# Patient Record
Sex: MALE | Race: WHITE | NOT HISPANIC OR LATINO | Employment: FULL TIME | URBAN - METROPOLITAN AREA
[De-identification: names, ages, dates, MRNs, and addresses within clinical notes are randomized per-mention and may not be internally consistent; named-entity substitution may affect disease eponyms.]

---

## 2019-10-29 ENCOUNTER — LAB (OUTPATIENT)
Dept: LAB | Facility: HOSPITAL | Age: 69
End: 2019-10-29

## 2019-10-29 ENCOUNTER — OFFICE VISIT (OUTPATIENT)
Dept: FAMILY MEDICINE CLINIC | Facility: CLINIC | Age: 69
End: 2019-10-29

## 2019-10-29 ENCOUNTER — TELEPHONE (OUTPATIENT)
Dept: FAMILY MEDICINE CLINIC | Facility: CLINIC | Age: 69
End: 2019-10-29

## 2019-10-29 VITALS
DIASTOLIC BLOOD PRESSURE: 88 MMHG | SYSTOLIC BLOOD PRESSURE: 128 MMHG | BODY MASS INDEX: 37.37 KG/M2 | WEIGHT: 282 LBS | TEMPERATURE: 98.8 F | OXYGEN SATURATION: 98 % | RESPIRATION RATE: 16 BRPM | HEART RATE: 101 BPM | HEIGHT: 73 IN

## 2019-10-29 DIAGNOSIS — I10 ESSENTIAL HYPERTENSION: ICD-10-CM

## 2019-10-29 DIAGNOSIS — N40.1 BENIGN PROSTATIC HYPERPLASIA WITH LOWER URINARY TRACT SYMPTOMS, SYMPTOM DETAILS UNSPECIFIED: ICD-10-CM

## 2019-10-29 DIAGNOSIS — Z23 FLU VACCINE NEED: ICD-10-CM

## 2019-10-29 DIAGNOSIS — R53.83 FATIGUE, UNSPECIFIED TYPE: ICD-10-CM

## 2019-10-29 DIAGNOSIS — I48.91 ATRIAL FIBRILLATION, UNSPECIFIED TYPE (HCC): Primary | ICD-10-CM

## 2019-10-29 DIAGNOSIS — Z12.11 COLON CANCER SCREENING: ICD-10-CM

## 2019-10-29 DIAGNOSIS — J40 BRONCHITIS: ICD-10-CM

## 2019-10-29 LAB
ALBUMIN SERPL-MCNC: 4.1 G/DL (ref 3.5–5.2)
ALBUMIN/GLOB SERPL: 1.4 G/DL
ALP SERPL-CCNC: 63 U/L (ref 39–117)
ALT SERPL W P-5'-P-CCNC: 96 U/L (ref 1–41)
ANION GAP SERPL CALCULATED.3IONS-SCNC: 12.6 MMOL/L (ref 5–15)
AST SERPL-CCNC: 77 U/L (ref 1–40)
BASOPHILS # BLD AUTO: 0.04 10*3/MM3 (ref 0–0.2)
BASOPHILS NFR BLD AUTO: 0.7 % (ref 0–1.5)
BILIRUB SERPL-MCNC: 0.7 MG/DL (ref 0.2–1.2)
BUN BLD-MCNC: 11 MG/DL (ref 8–23)
BUN/CREAT SERPL: 12 (ref 7–25)
CALCIUM SPEC-SCNC: 9.4 MG/DL (ref 8.6–10.5)
CHLORIDE SERPL-SCNC: 104 MMOL/L (ref 98–107)
CHOLEST SERPL-MCNC: 123 MG/DL (ref 0–200)
CO2 SERPL-SCNC: 22.4 MMOL/L (ref 22–29)
CREAT BLD-MCNC: 0.92 MG/DL (ref 0.76–1.27)
DEPRECATED RDW RBC AUTO: 42.8 FL (ref 37–54)
EOSINOPHIL # BLD AUTO: 0.16 10*3/MM3 (ref 0–0.4)
EOSINOPHIL NFR BLD AUTO: 2.8 % (ref 0.3–6.2)
ERYTHROCYTE [DISTWIDTH] IN BLOOD BY AUTOMATED COUNT: 12.7 % (ref 12.3–15.4)
GFR SERPL CREATININE-BSD FRML MDRD: 82 ML/MIN/1.73
GLOBULIN UR ELPH-MCNC: 3 GM/DL
GLUCOSE BLD-MCNC: 103 MG/DL (ref 65–99)
HCT VFR BLD AUTO: 50.2 % (ref 37.5–51)
HDLC SERPL-MCNC: 40 MG/DL (ref 40–60)
HGB BLD-MCNC: 17.9 G/DL (ref 13–17.7)
IMM GRANULOCYTES # BLD AUTO: 0.02 10*3/MM3 (ref 0–0.05)
IMM GRANULOCYTES NFR BLD AUTO: 0.3 % (ref 0–0.5)
LDLC SERPL CALC-MCNC: 51 MG/DL (ref 0–100)
LDLC/HDLC SERPL: 1.28 {RATIO}
LYMPHOCYTES # BLD AUTO: 0.91 10*3/MM3 (ref 0.7–3.1)
LYMPHOCYTES NFR BLD AUTO: 15.7 % (ref 19.6–45.3)
MCH RBC QN AUTO: 32.9 PG (ref 26.6–33)
MCHC RBC AUTO-ENTMCNC: 35.7 G/DL (ref 31.5–35.7)
MCV RBC AUTO: 92.3 FL (ref 79–97)
MONOCYTES # BLD AUTO: 0.57 10*3/MM3 (ref 0.1–0.9)
MONOCYTES NFR BLD AUTO: 9.9 % (ref 5–12)
NEUTROPHILS # BLD AUTO: 4.08 10*3/MM3 (ref 1.7–7)
NEUTROPHILS NFR BLD AUTO: 70.6 % (ref 42.7–76)
NRBC BLD AUTO-RTO: 0 /100 WBC (ref 0–0.2)
PLATELET # BLD AUTO: 138 10*3/MM3 (ref 140–450)
PMV BLD AUTO: 11.5 FL (ref 6–12)
POTASSIUM BLD-SCNC: 4.4 MMOL/L (ref 3.5–5.2)
PROT SERPL-MCNC: 7.1 G/DL (ref 6–8.5)
PSA SERPL-MCNC: 4.7 NG/ML (ref 0–4)
RBC # BLD AUTO: 5.44 10*6/MM3 (ref 4.14–5.8)
SODIUM BLD-SCNC: 139 MMOL/L (ref 136–145)
TRIGL SERPL-MCNC: 159 MG/DL (ref 0–150)
TSH SERPL DL<=0.05 MIU/L-ACNC: 2.92 UIU/ML (ref 0.27–4.2)
VLDLC SERPL-MCNC: 31.8 MG/DL (ref 5–40)
WBC NRBC COR # BLD: 5.78 10*3/MM3 (ref 3.4–10.8)

## 2019-10-29 PROCEDURE — 99203 OFFICE O/P NEW LOW 30 MIN: CPT | Performed by: PHYSICIAN ASSISTANT

## 2019-10-29 PROCEDURE — 85025 COMPLETE CBC W/AUTO DIFF WBC: CPT

## 2019-10-29 PROCEDURE — 80061 LIPID PANEL: CPT

## 2019-10-29 PROCEDURE — 84443 ASSAY THYROID STIM HORMONE: CPT

## 2019-10-29 PROCEDURE — 84153 ASSAY OF PSA TOTAL: CPT

## 2019-10-29 PROCEDURE — 80053 COMPREHEN METABOLIC PANEL: CPT | Performed by: PHYSICIAN ASSISTANT

## 2019-10-29 PROCEDURE — 36415 COLL VENOUS BLD VENIPUNCTURE: CPT | Performed by: PHYSICIAN ASSISTANT

## 2019-10-29 RX ORDER — CEFDINIR 300 MG/1
300 CAPSULE ORAL 2 TIMES DAILY
Qty: 28 CAPSULE | Refills: 0 | Status: SHIPPED | OUTPATIENT
Start: 2019-10-29 | End: 2019-11-06

## 2019-10-29 RX ORDER — DILTIAZEM HYDROCHLORIDE 180 MG/1
180 CAPSULE, EXTENDED RELEASE ORAL DAILY
Refills: 0 | COMMUNITY
Start: 2019-08-18

## 2019-10-29 RX ORDER — RIVAROXABAN 20 MG/1
20 TABLET, FILM COATED ORAL DAILY
Refills: 3 | COMMUNITY
Start: 2019-10-05 | End: 2022-06-15

## 2019-10-29 RX ORDER — LOSARTAN POTASSIUM 50 MG/1
1 TABLET ORAL DAILY
COMMUNITY
End: 2021-06-10 | Stop reason: ALTCHOICE

## 2019-10-29 RX ORDER — DUTASTERIDE 0.5 MG/1
0.5 CAPSULE, LIQUID FILLED ORAL DAILY
Qty: 30 CAPSULE | Refills: 11 | Status: SHIPPED | OUTPATIENT
Start: 2019-10-29 | End: 2021-06-10

## 2019-10-29 NOTE — TELEPHONE ENCOUNTER
I spoke with Isabela and she said that the patient found the prescription that Esvin had written him.

## 2019-10-29 NOTE — PROGRESS NOTES
Subjective   Vishal Dejesus is a 69 y.o. male  Establish Care (No recent labs. Needs cardiologist referral. Req flu vaccine. )      History of Present Illness  Patient is a pleasant 69-year-old white male who is a former patient comes in a new patient been over 5 years since his been here he comes in with multiple complaints his first complaint is chronic atrial fibrillation is controlled with medication he needs referral to cardiology    Patient comes in complaining of increased frequency upon urination nocturia trouble stopping starting urination for the last year patient states he gets up at least 4-5 times during the night he has decreased stream hesitancy and symptoms been getting worse the last couple of weeks    Patient comes in follow-up of hypertension needs refill on blood pressure medicine also needs lab work but over the years has had blood work done he has some fatigue no energy this is been going for some time spent over a year since he had blood work done    Patient comes in stating he needs colon cancer screening has not had a colonoscopy he is put it off would like to get one    Patient has sinus pressure sinus congestion blowing green-yellow drainage nose mild sore throat cough is slightly productive frontal headache mild sore throat cough wheezing and chest      The following portions of the patient's history were reviewed and updated as appropriate: allergies, current medications, past social history and problem list    Review of Systems   Constitutional: Negative for chills, fatigue and fever.   HENT: Positive for congestion, ear pain, postnasal drip, rhinorrhea and sinus pressure. Negative for sore throat.    Eyes: Positive for discharge and itching. Negative for pain.   Respiratory: Positive for cough, chest tightness and wheezing. Negative for shortness of breath.    Cardiovascular: Negative for chest pain.   Gastrointestinal: Negative.  Negative for nausea.   Genitourinary: Negative.     Musculoskeletal: Negative for myalgias.   Neurological: Positive for light-headedness and headaches. Negative for dizziness.   Hematological: Negative for adenopathy.   Psychiatric/Behavioral: Positive for sleep disturbance.       Objective     Vitals:    10/29/19 1444   BP: 128/88   Pulse: 101   Resp: 16   Temp: 98.8 °F (37.1 °C)   SpO2: 98%       Physical Exam   Constitutional: He appears well-developed and well-nourished.   HENT:   Head: Normocephalic and atraumatic.   Right Ear: Tympanic membrane and ear canal normal.   Left Ear: Tympanic membrane and ear canal normal.   Nose: Mucosal edema, rhinorrhea and sinus tenderness present. Right sinus exhibits maxillary sinus tenderness and frontal sinus tenderness. Left sinus exhibits maxillary sinus tenderness and frontal sinus tenderness.   Mouth/Throat: Oropharynx is clear and moist. No oropharyngeal exudate.   Eyes: Pupils are equal, round, and reactive to light.   Cardiovascular: Normal rate and regular rhythm.   Pulmonary/Chest: Effort normal and breath sounds normal.   Nursing note and vitals reviewed.      Assessment/Plan     Diagnoses and all orders for this visit:    Atrial fibrillation, unspecified type (CMS/HCC)  -     XARELTO 20 MG tablet; Take 20 mg by mouth Daily.  -     losartan (COZAAR) 50 MG tablet; Take 1 tablet by mouth Daily.  -     metoprolol tartrate (LOPRESSOR) 25 MG tablet; Take 25 mg by mouth Daily.  -     Ambulatory Referral to Cardiology    Benign prostatic hyperplasia with lower urinary tract symptoms, symptom details unspecified  -     PSA DIAGNOSTIC; Future  -     dutasteride (AVODART) 0.5 MG capsule; Take 1 capsule by mouth Daily.    Essential hypertension  -     losartan (COZAAR) 50 MG tablet; Take 1 tablet by mouth Daily.  -     metoprolol tartrate (LOPRESSOR) 25 MG tablet; Take 25 mg by mouth Daily.  -     Lipid Panel; Future    Fatigue, unspecified type  -     Comprehensive Metabolic Panel  -     CBC & Differential; Future  -      TSH; Future    Colon cancer screening  -     Ambulatory Referral For Screening Colonoscopy    Bronchitis  -     cefdinir (OMNICEF) 300 MG capsule; Take 1 capsule by mouth 2 (Two) Times a Day.    Other orders  -     CARTIA  MG 24 hr capsule; Take 180 mg by mouth Daily.    Tussionex 1 teaspoon every 12 hours  #180ml

## 2019-10-29 NOTE — TELEPHONE ENCOUNTER
PHARMACY CALLED IN TO REQUEST COUGH MEDICINE BE SENT INTO THE PHARMACY PATIENT IS THERE WAITING. PATIENT TOLD PHARMACY THIS COUGH SYRUP WAS OF HIGH IMPORTANCE PLEASE CALL IT IN TONIGHT

## 2019-11-06 ENCOUNTER — OFFICE VISIT (OUTPATIENT)
Dept: FAMILY MEDICINE CLINIC | Facility: CLINIC | Age: 69
End: 2019-11-06

## 2019-11-06 VITALS
WEIGHT: 278.4 LBS | HEART RATE: 76 BPM | HEIGHT: 73 IN | BODY MASS INDEX: 36.9 KG/M2 | SYSTOLIC BLOOD PRESSURE: 126 MMHG | TEMPERATURE: 98.3 F | RESPIRATION RATE: 18 BRPM | OXYGEN SATURATION: 98 % | DIASTOLIC BLOOD PRESSURE: 90 MMHG

## 2019-11-06 DIAGNOSIS — R97.20 ELEVATED PSA: ICD-10-CM

## 2019-11-06 DIAGNOSIS — E66.01 MORBIDLY OBESE (HCC): ICD-10-CM

## 2019-11-06 DIAGNOSIS — J40 BRONCHITIS: Primary | ICD-10-CM

## 2019-11-06 PROCEDURE — 99214 OFFICE O/P EST MOD 30 MIN: CPT | Performed by: PHYSICIAN ASSISTANT

## 2019-11-06 RX ORDER — LEVOFLOXACIN 500 MG/1
500 TABLET, FILM COATED ORAL DAILY
Qty: 14 TABLET | Refills: 0 | Status: SHIPPED | OUTPATIENT
Start: 2019-11-06 | End: 2020-10-15

## 2019-11-06 RX ORDER — ALBUTEROL SULFATE 90 UG/1
2 AEROSOL, METERED RESPIRATORY (INHALATION) EVERY 4 HOURS PRN
Qty: 6.7 G | Refills: 1 | Status: SHIPPED | OUTPATIENT
Start: 2019-11-06 | End: 2022-06-15

## 2019-11-06 RX ORDER — LEVOFLOXACIN 500 MG/1
500 TABLET, FILM COATED ORAL DAILY
Qty: 10 TABLET | Refills: 0 | Status: SHIPPED | OUTPATIENT
Start: 2019-11-06 | End: 2019-11-06

## 2019-11-06 NOTE — PROGRESS NOTES
Subjective   Vishal Dejesus is a 69 y.o. male  Cough (F/U-finished cefdinir and cough syrup)      History of Present Illness  69-year-old white male who comes in for follow-up of cough patient still coughing congested coughing up yellow sputum did not get any better on Omnicef and Tussionex patient's cough is productive some yellow sputum    Patient comes in follow-up of blood work PSA was 4.7 he was started on Avodart his last visit patient states that he has had some minimal improvement PSA was elevated    Patient comes in complaining of obesity BMI is 36.74  wants to lose weight on recent blood work is liver enzymes are elevated  The following portions of the patient's history were reviewed and updated as appropriate: allergies, current medications, past social history and problem list    Review of Systems   Constitutional: Negative for appetite change, diaphoresis, fatigue and unexpected weight change.   Eyes: Negative for visual disturbance.   Respiratory: Positive for cough, shortness of breath and wheezing. Negative for chest tightness.    Cardiovascular: Negative for chest pain, palpitations and leg swelling.   Gastrointestinal: Negative for diarrhea, nausea and vomiting.   Endocrine: Negative for polydipsia, polyphagia and polyuria.   Skin: Negative for color change and rash.   Neurological: Negative for dizziness, syncope, weakness, light-headedness, numbness and headaches.       Objective     Vitals:    11/06/19 1028   BP: 126/90   Pulse: 76   Resp: 18   Temp: 98.3 °F (36.8 °C)   SpO2: 98%       Physical Exam   Constitutional: He appears well-developed and well-nourished.   HENT:   Head: Normocephalic and atraumatic.   Right Ear: Tympanic membrane and ear canal normal.   Left Ear: Tympanic membrane and ear canal normal.   Nose: Mucosal edema, rhinorrhea and sinus tenderness present. Right sinus exhibits maxillary sinus tenderness and frontal sinus tenderness. Left sinus exhibits maxillary sinus tenderness  and frontal sinus tenderness.   Mouth/Throat: No oropharyngeal exudate.   Eyes: Pupils are equal, round, and reactive to light.   Neck: Neck supple. No JVD present. No thyromegaly present.   Cardiovascular: Normal rate, regular rhythm, normal heart sounds, intact distal pulses and normal pulses.   No murmur heard.  Pulmonary/Chest: Effort normal and breath sounds normal. No respiratory distress.   Abdominal: Soft. Bowel sounds are normal. There is no hepatosplenomegaly. There is no tenderness.   Musculoskeletal: He exhibits no edema.   Lymphadenopathy:     He has no cervical adenopathy.   Neurological: No sensory deficit.   Skin: Skin is warm and dry. He is not diaphoretic.   Nursing note and vitals reviewed.      Assessment/Plan     Diagnoses and all orders for this visit:    Bronchitis  -     levoFLOXacin (LEVAQUIN) 500 MG tablet; Take 1 tablet by mouth Daily.  -     albuterol sulfate  (90 Base) MCG/ACT inhaler; Inhale 2 puffs Every 4 (Four) Hours As Needed for Wheezing.    Elevated PSA  -     Cancel: PSA DIAGNOSTIC; Future  -     PSA DIAGNOSTIC; Future  -     levoFLOXacin (LEVAQUIN) 500 MG tablet; Take 1 tablet by mouth Daily.    Morbidly obese (CMS/HCC)    Other orders  -     Discontinue: levoFLOXacin (LEVAQUIN) 500 MG tablet; Take 1 tablet by mouth Daily.    #1 Levaquin 500 mg 1 p.o. every day dispense 14    Albuterol inhaler 2 puffs every 6 hours as needed wheezing    2.  Patient's PSA was 4.7 we will go ahead and put him on Levaquin for 2 weeks at that time he is to come back and get a PSA to see if the number is changed we discussed treatment options referral to urology also we MRI of the prostate explained to patient this will not be covered by insurance he is willing to pay out-of-pocket which will set up if his PSA is still elevated    3.  Talked about losing weight low-carb low calorie diet exercise

## 2019-11-14 ENCOUNTER — TELEPHONE (OUTPATIENT)
Dept: FAMILY MEDICINE CLINIC | Facility: CLINIC | Age: 69
End: 2019-11-14

## 2019-11-14 NOTE — TELEPHONE ENCOUNTER
Pt is having surgery, left total knee on 11/26/19 and is inquiring if clear for surgery and they are sending all of pre-op paperwork

## 2019-11-19 ENCOUNTER — TELEPHONE (OUTPATIENT)
Dept: FAMILY MEDICINE CLINIC | Facility: CLINIC | Age: 69
End: 2019-11-19

## 2019-11-19 NOTE — TELEPHONE ENCOUNTER
"----- Message from Clayton Chen sent at 11/19/2019 11:16 AM EST -----  Contact: EDDI IRVIN FROM [facility].  [phone]  PATIENT IS HAVING SX ON Monday THE 26TH.  EDDI IRVIN IS CALLING TO GET A CLEARANCE FOR SX FROM [facility].  PER EDDI, [facility] CAN SIMPLY USE A PRESCRIPTION PAD TO WRITE \"CLEARED\" AND THEN FAX TO HER    [phone]  PHONE [phone]    "

## 2019-11-19 NOTE — TELEPHONE ENCOUNTER
Patient was here for an office visit Nov 6. We have received all of his preop test results for you to review.

## 2020-01-30 DIAGNOSIS — R97.20 ELEVATED PSA: Primary | ICD-10-CM

## 2020-02-12 ENCOUNTER — TELEPHONE (OUTPATIENT)
Dept: GASTROENTEROLOGY | Facility: CLINIC | Age: 70
End: 2020-02-12

## 2020-02-12 RX ORDER — SODIUM, POTASSIUM,MAG SULFATES 17.5-3.13G
1 SOLUTION, RECONSTITUTED, ORAL ORAL TAKE AS DIRECTED
Qty: 2 BOTTLE | Refills: 0 | Status: SHIPPED | OUTPATIENT
Start: 2020-02-12 | End: 2021-06-10

## 2020-02-12 NOTE — TELEPHONE ENCOUNTER
Cardiac clearance faxed to Gagandeep [place] office on 2/12/2020. Patient is currently on Xarelto.

## 2020-02-21 ENCOUNTER — OUTSIDE FACILITY SERVICE (OUTPATIENT)
Dept: GASTROENTEROLOGY | Facility: CLINIC | Age: 70
End: 2020-02-21

## 2020-02-21 ENCOUNTER — LAB REQUISITION (OUTPATIENT)
Dept: LAB | Facility: HOSPITAL | Age: 70
End: 2020-02-21

## 2020-02-21 DIAGNOSIS — Z12.11 ENCOUNTER FOR SCREENING FOR MALIGNANT NEOPLASM OF COLON: ICD-10-CM

## 2020-02-21 PROCEDURE — 88305 TISSUE EXAM BY PATHOLOGIST: CPT | Performed by: INTERNAL MEDICINE

## 2020-02-21 PROCEDURE — 45385 COLONOSCOPY W/LESION REMOVAL: CPT | Performed by: INTERNAL MEDICINE

## 2020-02-24 LAB
CYTO UR: NORMAL
LAB AP CASE REPORT: NORMAL
LAB AP CLINICAL INFORMATION: NORMAL
PATH REPORT.FINAL DX SPEC: NORMAL
PATH REPORT.GROSS SPEC: NORMAL

## 2020-02-25 ENCOUNTER — TELEPHONE (OUTPATIENT)
Dept: GASTROENTEROLOGY | Facility: CLINIC | Age: 70
End: 2020-02-25

## 2020-02-25 NOTE — TELEPHONE ENCOUNTER
----- Message from Jonathon York MD sent at 2/25/2020 12:35 PM EST -----  Let Mr. Dejesus know the polyps were adenoma type.  He will need a repeat examination in 3 years.  Thank you,  MICHELLE

## 2020-10-15 ENCOUNTER — TELEPHONE (OUTPATIENT)
Dept: FAMILY MEDICINE CLINIC | Facility: CLINIC | Age: 70
End: 2020-10-15

## 2020-10-15 ENCOUNTER — TELEMEDICINE (OUTPATIENT)
Dept: FAMILY MEDICINE CLINIC | Facility: CLINIC | Age: 70
End: 2020-10-15

## 2020-10-15 DIAGNOSIS — R05.9 COUGH: Primary | ICD-10-CM

## 2020-10-15 PROCEDURE — 99213 OFFICE O/P EST LOW 20 MIN: CPT | Performed by: PHYSICIAN ASSISTANT

## 2020-10-15 RX ORDER — LEVOFLOXACIN 500 MG/1
500 TABLET, FILM COATED ORAL DAILY
Qty: 10 TABLET | Refills: 0 | Status: SHIPPED | OUTPATIENT
Start: 2020-10-15 | End: 2021-06-10

## 2020-10-15 NOTE — PROGRESS NOTES
Subjective   Vishal Dejesus is a 70 y.o. male  Cough  video    Cough  Pertinent negatives include no chest pain, headaches, rash or shortness of breath.   Patient is a pleasant 70-year-old white male who comes in for cough congestion no shortness of breath blowing yellow drainage from nose no fever no shortness of breath no chest pain no taste or smell intolerance cough is worse at night large sinus pressure congestion    The following portions of the patient's history were reviewed and updated as appropriate: allergies, current medications, past social history and problem list    Review of Systems   Constitutional: Negative for appetite change, diaphoresis, fatigue and unexpected weight change.   Eyes: Negative for visual disturbance.   Respiratory: Positive for cough. Negative for chest tightness and shortness of breath.    Cardiovascular: Negative for chest pain, palpitations and leg swelling.   Gastrointestinal: Negative for diarrhea, nausea and vomiting.   Endocrine: Negative for polydipsia, polyphagia and polyuria.   Skin: Negative for color change and rash.   Neurological: Negative for dizziness, syncope, weakness, light-headedness, numbness and headaches.       Objective     There were no vitals filed for this visit.    Physical Exam  Constitutional:       Appearance: Normal appearance. He is normal weight.   Neurological:      Mental Status: He is alert.   Psychiatric:         Mood and Affect: Mood normal.         Behavior: Behavior normal.         Thought Content: Thought content normal.         Judgment: Judgment normal.         Assessment/Plan     Diagnoses and all orders for this visit:    1. Cough (Primary)  -     levoFLOXacin (Levaquin) 500 MG tablet; Take 1 tablet by mouth Daily.  Dispense: 10 tablet; Refill: 0    Tussionex 1 teaspoon every 12 hours dispense 120    Follow-up if no better spent 16 minutes states importance of taking medication follow-up

## 2020-10-15 NOTE — TELEPHONE ENCOUNTER
Patient wanting to be seen for a cough and congestion - possible bronchitis. I advised patient we are screening all of patient due to covid and he stated he would be okay with a virtual appt, he does have an iphone so he can facetime. He wanted the message to go to Esvin directly to make sure he knows what is going on - he stated that his wife Risa has it too.     Call back number is [phone]

## 2020-10-15 NOTE — TELEPHONE ENCOUNTER
Esvin said that he can see patient this afternoon at 1:45. I notified patient and he is scheduled for a video visit.

## 2021-02-24 ENCOUNTER — LAB (OUTPATIENT)
Dept: LAB | Facility: HOSPITAL | Age: 71
End: 2021-02-24

## 2021-02-24 DIAGNOSIS — Z12.5 SPECIAL SCREENING FOR MALIGNANT NEOPLASM OF PROSTATE: Primary | ICD-10-CM

## 2021-02-24 DIAGNOSIS — Z12.5 SPECIAL SCREENING FOR MALIGNANT NEOPLASM OF PROSTATE: ICD-10-CM

## 2021-02-24 LAB — PSA SERPL-MCNC: 5.79 NG/ML (ref 0–4)

## 2021-02-24 PROCEDURE — 36415 COLL VENOUS BLD VENIPUNCTURE: CPT

## 2021-02-24 PROCEDURE — G0103 PSA SCREENING: HCPCS

## 2021-03-10 DIAGNOSIS — R97.20 ELEVATED PSA: Primary | ICD-10-CM

## 2021-03-29 ENCOUNTER — OFFICE VISIT (OUTPATIENT)
Dept: UROLOGY | Facility: CLINIC | Age: 71
End: 2021-03-29

## 2021-03-29 VITALS
HEART RATE: 87 BPM | DIASTOLIC BLOOD PRESSURE: 80 MMHG | WEIGHT: 270 LBS | OXYGEN SATURATION: 98 % | HEIGHT: 73 IN | BODY MASS INDEX: 35.78 KG/M2 | SYSTOLIC BLOOD PRESSURE: 118 MMHG | RESPIRATION RATE: 18 BRPM

## 2021-03-29 DIAGNOSIS — R97.20 ELEVATED PROSTATE SPECIFIC ANTIGEN (PSA): Primary | ICD-10-CM

## 2021-03-29 LAB
BILIRUB BLD-MCNC: NEGATIVE MG/DL
CLARITY, POC: CLEAR
COLOR UR: YELLOW
GLUCOSE UR STRIP-MCNC: NEGATIVE MG/DL
KETONES UR QL: NEGATIVE
LEUKOCYTE EST, POC: NEGATIVE
NITRITE UR-MCNC: NEGATIVE MG/ML
PH UR: 6 [PH] (ref 5–8)
PROT UR STRIP-MCNC: NEGATIVE MG/DL
RBC # UR STRIP: NEGATIVE /UL
SP GR UR: 1.03 (ref 1–1.03)
UROBILINOGEN UR QL: NORMAL

## 2021-03-29 PROCEDURE — 99024 POSTOP FOLLOW-UP VISIT: CPT | Performed by: UROLOGY

## 2021-03-29 PROCEDURE — 81003 URINALYSIS AUTO W/O SCOPE: CPT | Performed by: UROLOGY

## 2021-06-10 ENCOUNTER — OFFICE VISIT (OUTPATIENT)
Dept: FAMILY MEDICINE CLINIC | Facility: CLINIC | Age: 71
End: 2021-06-10

## 2021-06-10 VITALS
DIASTOLIC BLOOD PRESSURE: 78 MMHG | HEIGHT: 73 IN | HEART RATE: 56 BPM | RESPIRATION RATE: 16 BRPM | SYSTOLIC BLOOD PRESSURE: 132 MMHG | BODY MASS INDEX: 36.53 KG/M2 | WEIGHT: 275.6 LBS | TEMPERATURE: 96.9 F | OXYGEN SATURATION: 98 %

## 2021-06-10 DIAGNOSIS — Z01.818 PREOPERATIVE CLEARANCE: ICD-10-CM

## 2021-06-10 DIAGNOSIS — I48.91 ATRIAL FIBRILLATION, UNSPECIFIED TYPE (HCC): Primary | ICD-10-CM

## 2021-06-10 PROCEDURE — 99213 OFFICE O/P EST LOW 20 MIN: CPT | Performed by: PHYSICIAN ASSISTANT

## 2021-06-10 RX ORDER — METOPROLOL SUCCINATE 25 MG/1
25 TABLET, EXTENDED RELEASE ORAL DAILY
COMMUNITY
Start: 2021-06-01

## 2021-06-10 NOTE — PROGRESS NOTES
Subjective   Vishal Dejesus is a 70 y.o. male  Pre-op Exam (Rt cataract surgery Vanesa 15 by Dr. Hay Luke at [facility]. Fax clearance to [phone])      History of Present Illness  Patient is a pleasant 70-year-old white male who comes in for chronic atrial fib which is controlled with medication, chronic and stable patient needs preop clearance for right cataract surgery.  The following portions of the patient's history were reviewed and updated as appropriate: allergies, current medications, past social history and problem list    Review of Systems   Constitutional: Negative for fatigue and unexpected weight change.   Respiratory: Negative for cough, chest tightness and shortness of breath.    Cardiovascular: Negative for chest pain, palpitations and leg swelling.   Gastrointestinal: Negative for nausea.   Skin: Negative for color change and rash.   Neurological: Negative for dizziness, syncope, weakness and headaches.       Objective     Vitals:    06/10/21 1255   BP: 132/78   Pulse: 56   Resp: 16   Temp: 96.9 °F (36.1 °C)   SpO2: 98%       Physical Exam  Vitals and nursing note reviewed.   Constitutional:       Appearance: He is well-developed.   Neck:      Vascular: No JVD.   Cardiovascular:      Rate and Rhythm: Normal rate and regular rhythm.      Pulses: Normal pulses.      Heart sounds: Normal heart sounds. No murmur heard.     Pulmonary:      Effort: Pulmonary effort is normal. No respiratory distress.      Breath sounds: Normal breath sounds.   Abdominal:      General: Bowel sounds are normal.      Palpations: Abdomen is soft.      Tenderness: There is no abdominal tenderness.   Skin:     General: Skin is warm and dry.         Assessment/Plan     Diagnoses and all orders for this visit:    1. Atrial fibrillation, unspecified type (CMS/HCC) (Primary)    2. Preoperative clearance    #1 continue all meds ,stable    2 cleared for procedure:    Follow-up as needed

## 2021-06-13 ENCOUNTER — APPOINTMENT (OUTPATIENT)
Dept: PREADMISSION TESTING | Facility: HOSPITAL | Age: 71
End: 2021-06-13

## 2021-07-26 DIAGNOSIS — M25.521 RIGHT ELBOW PAIN: Primary | ICD-10-CM

## 2021-08-09 ENCOUNTER — OFFICE VISIT (OUTPATIENT)
Dept: UROLOGY | Age: 71
End: 2021-08-09

## 2021-08-09 VITALS
TEMPERATURE: 98.1 F | RESPIRATION RATE: 14 BRPM | SYSTOLIC BLOOD PRESSURE: 130 MMHG | DIASTOLIC BLOOD PRESSURE: 72 MMHG | HEART RATE: 70 BPM | OXYGEN SATURATION: 98 %

## 2021-08-09 DIAGNOSIS — R97.20 ELEVATED PROSTATE SPECIFIC ANTIGEN (PSA): Primary | ICD-10-CM

## 2021-08-09 LAB
BILIRUB BLD-MCNC: NEGATIVE MG/DL
CLARITY, POC: CLEAR
COLOR UR: YELLOW
GLUCOSE UR STRIP-MCNC: NEGATIVE MG/DL
KETONES UR QL: NEGATIVE
LEUKOCYTE EST, POC: NEGATIVE
NITRITE UR-MCNC: NEGATIVE MG/ML
PH UR: 5.5 [PH] (ref 5–8)
PROT UR STRIP-MCNC: NEGATIVE MG/DL
RBC # UR STRIP: NEGATIVE /UL
SP GR UR: 1.02 (ref 1–1.03)
UROBILINOGEN UR QL: NORMAL

## 2021-08-09 PROCEDURE — 99203 OFFICE O/P NEW LOW 30 MIN: CPT | Performed by: UROLOGY

## 2021-08-09 PROCEDURE — 81003 URINALYSIS AUTO W/O SCOPE: CPT | Performed by: UROLOGY

## 2021-08-09 NOTE — PROGRESS NOTES
Chief Complaint  Elevated PSA    Referring Provider  Gagandeep Galaviz,*    HPI  Mr. Dejesus is a 70 y.o.  male who presents with an elevated PSA to   PSA    PSA 2/24/21   PSA 5.790 (A)   (A) Abnormal value             Patient complains of nocturia.  His IPSS score is 7.     Patient denies fevers, chills, nausea, vomiting, constipation, or flank pain.  Past  history is negative for BPH, frequent UTIs, gross hematuria, stones or other renal diseases.     He denies shortness of breath, leg swelling, calf pain or bone pain.    Past Medical History  Past Medical History:   Diagnosis Date   • A-fib (CMS/[facility])        Past Surgical History  Past Surgical History:   Procedure Laterality Date   • APPENDECTOMY  1986   • CARDIAC ABLATION  2013   • KNEE SURGERY         Medications    Current Outpatient Medications:   •  CARTIA  MG 24 hr capsule, Take 180 mg by mouth Daily., Disp: , Rfl: 0  •  metoprolol succinate XL (TOPROL-XL) 25 MG 24 hr tablet, Take 25 mg by mouth Daily., Disp: , Rfl:   •  XARELTO 20 MG tablet, Take 20 mg by mouth Daily., Disp: , Rfl: 3  •  albuterol sulfate  (90 Base) MCG/ACT inhaler, Inhale 2 puffs Every 4 (Four) Hours As Needed for Wheezing., Disp: 6.7 g, Rfl: 1    Allergies  No Known Allergies    Social History  Social History     Tobacco Use   • Smoking status: Never Smoker   • Smokeless tobacco: Never Used   Substance Use Topics   • Alcohol use: Yes     Comment: 5 drinks/week   • Drug use: No       Family History  Family History   Problem Relation Age of Onset   • Hypertension Mother    • Stroke Mother    • Hypertension Sister    • Diabetes Sister       He has no family history of prostate cancer.    Review of Systems  A full review of systems was completed and notable for afib.    Physical Exam  Visit Vitals  /72   Pulse 70   Temp 98.1 °F (36.7 °C) (Temporal)   Resp 14   SpO2 98%     A physical exam was notable for obesity     Genitourinary  Penis: circumcised penis,  glans normal, no penile discharge.  No rashes/lesions.    Testes: descended bilaterally, no masses, nontender to palpation. Remainder of scrotal contents normal. No hernia appreciated.  Perineum:  No perineal pain with palpation.  Rectal:  Normal tone, no masses.  Prostate:  30 grams.  Symmetric, non-tender, anodular and no induration.      Labs  Brief Urine Lab Results  (Last result in the past 365 days)      Color   Clarity   Blood   Leuk Est   Nitrite   Protein   CREAT   Urine HCG        08/09/21 1512 Yellow Clear Negative Negative Negative Negative               Lab Results   Component Value Date    PSA 5.790 (H) 02/24/2021    PSA 4.700 (H) 10/29/2019     Assessment  Mr. Dejesus is a 70 y.o.  male who presents with elevated PSA.  This is a new diagnosis of uncertain clinical prognosis.    I explained to the patient that an elevated PSA is a marker of risk of prostate cancer and a prostate biopsy would be the next step in diagnosis. I explained that sampling error can occur with any biopsy and there is a risk of potentially missing a cancer that may be present.    I discussed the risks, benefits, and alternatives to prostate biopsy, including hematuria, hematochezia, and hematospermia.  I also discussed the risk of diagnosing a clinically-insignificant prostate cancer.  I discussed the risks of sepsis, which can be minimized by prophylactic antibiotics.     Plan  1. 4k score blood test

## 2021-09-13 ENCOUNTER — OFFICE VISIT (OUTPATIENT)
Dept: UROLOGY | Age: 71
End: 2021-09-13

## 2021-09-13 VITALS
WEIGHT: 265 LBS | SYSTOLIC BLOOD PRESSURE: 128 MMHG | RESPIRATION RATE: 18 BRPM | BODY MASS INDEX: 35.12 KG/M2 | HEIGHT: 73 IN | HEART RATE: 78 BPM | OXYGEN SATURATION: 98 % | DIASTOLIC BLOOD PRESSURE: 78 MMHG

## 2021-09-13 DIAGNOSIS — R97.20 ELEVATED PSA: Primary | ICD-10-CM

## 2021-09-13 PROCEDURE — 99212 OFFICE O/P EST SF 10 MIN: CPT | Performed by: UROLOGY

## 2021-09-13 NOTE — PROGRESS NOTES
"Chief Complaint  Elevated PSA    HPI  Mr. Dejesus is a 70 y.o.  male who presents with an elevated PSA to   PSA    PSA 2/24/21   PSA 5.790 (A)   (A) Abnormal value             Patient complains of nocturia.  His IPSS score is 7.  No change in symptomatology today.    He denies shortness of breath, leg swelling, calf pain or bone pain.    Past Medical History  Past Medical History:   Diagnosis Date   • A-fib (CMS/HCC)        Past Surgical History  Past Surgical History:   Procedure Laterality Date   • APPENDECTOMY  1986   • CARDIAC ABLATION  2013   • KNEE SURGERY         Medications    Current Outpatient Medications:   •  CARTIA  MG 24 hr capsule, Take 180 mg by mouth Daily., Disp: , Rfl: 0  •  metoprolol succinate XL (TOPROL-XL) 25 MG 24 hr tablet, Take 25 mg by mouth Daily., Disp: , Rfl:   •  XARELTO 20 MG tablet, Take 20 mg by mouth Daily., Disp: , Rfl: 3  •  albuterol sulfate  (90 Base) MCG/ACT inhaler, Inhale 2 puffs Every 4 (Four) Hours As Needed for Wheezing., Disp: 6.7 g, Rfl: 1    Allergies  No Known Allergies    Social History  Social History     Tobacco Use   • Smoking status: Never Smoker   • Smokeless tobacco: Never Used   Substance Use Topics   • Alcohol use: Yes     Comment: 5 drinks/week   • Drug use: No       Family History  Family History   Problem Relation Age of Onset   • Hypertension Mother    • Stroke Mother    • Hypertension Sister    • Diabetes Sister       He has no family history of prostate cancer.    Review of Systems  A full review of systems was completed and notable for afib.    Physical Exam  Visit Vitals  /78   Pulse 78   Resp 18   Ht 185.4 cm (73\")   Wt 120 kg (265 lb)   SpO2 98%   BMI 34.96 kg/m²     A physical exam was notable for obesity       Labs  Brief Urine Lab Results  (Last result in the past 365 days)      Color   Clarity   Blood   Leuk Est   Nitrite   Protein   CREAT   Urine HCG        08/09/21 1512 Yellow Clear Negative Negative Negative " Negative               Lab Results   Component Value Date    PSA 5.790 (H) 02/24/2021    PSA 4.700 (H) 10/29/2019     Assessment  Mr. Dejesus is a 70 y.o.  male who presents with persistently elevated PSA.     The patient presents again today with some confusion to discuss the 4K score results, but we have not drawn it yet.  I once again discussed with him how this is a more advanced blood test than PSA alone, and generally has to be drawn in the urologist office.  It tests total, intact, and free PSA, as well as HK2, to give a higher area under the curve of likelihood of a clinically significant prostate cancer.    Plan  1. 4k score blood test today and tele FU    I spent a total of 10 minutes with the patient and the chart engaging in data gathering and interpretation, patient interaction, as well as counseling on the risks, benefits, and alternatives of the therapy and coordinating care.

## 2021-09-30 ENCOUNTER — OFFICE VISIT (OUTPATIENT)
Dept: UROLOGY | Facility: CLINIC | Age: 71
End: 2021-09-30

## 2021-09-30 DIAGNOSIS — R97.20 ELEVATED PSA: Primary | ICD-10-CM

## 2021-09-30 PROCEDURE — 99442 PR PHYS/QHP TELEPHONE EVALUATION 11-20 MIN: CPT | Performed by: UROLOGY

## 2021-09-30 RX ORDER — MAGNESIUM HYDROXIDE 1200 MG/15ML
1 LIQUID ORAL ONCE
Qty: 1 ENEMA | Refills: 0 | Status: SHIPPED | OUTPATIENT
Start: 2021-09-30 | End: 2021-09-30

## 2021-09-30 RX ORDER — CEPHALEXIN 500 MG/1
500 CAPSULE ORAL 2 TIMES DAILY
Qty: 6 CAPSULE | Refills: 0 | Status: SHIPPED | OUTPATIENT
Start: 2021-09-30 | End: 2021-10-03

## 2021-09-30 RX ORDER — CIPROFLOXACIN 500 MG/1
500 TABLET, FILM COATED ORAL 2 TIMES DAILY
Qty: 6 TABLET | Refills: 0 | Status: SHIPPED | OUTPATIENT
Start: 2021-09-30 | End: 2022-06-15

## 2021-09-30 NOTE — PROGRESS NOTES
Lab Results   Component Value Date    PSA 5.790 (H) 02/24/2021    PSA 4.700 (H) 10/29/2019               I had a 13-minute telephone conversation with the patient about his 4K score results.  I explained to him that the 4K score test demonstrated a 52% chance of finding not only a cancer, but a clinically significant meaning intermediate or high risk prostate cancer.  I therefore recommended prostate biopsy.  We discussed the risk, benefits, and alternatives to biopsy.  I have sent in antibiotics and enema to be taken before biopsy to mitigate those risks.

## 2022-02-01 ENCOUNTER — TELEPHONE (OUTPATIENT)
Dept: FAMILY MEDICINE CLINIC | Facility: CLINIC | Age: 72
End: 2022-02-01

## 2022-02-01 NOTE — TELEPHONE ENCOUNTER
PATIENT WOULD LIKE A CALL BACK FROM CLINICAL. HE SAID THAT HE IS HAVING THAT SHARP CHEST PAIN THAT HE HAD BEFORE, DOES NOT WANT TO GO TO THE ER.   THIS HAS BEEN GOING ON FOR AWHILE PER PATIENT. TWO WEEKS AGO HE WENT TO [facility] CARDIOLOGY AND EVERYTHING CHECKED OUT FINE. HE DID A STRESS TEST AND IT WAS FINE.  WENT TO THE GYM THIS MORNING AND DID A PRETTY GOOD WORK OUT, THEN WHEN HE GOT HOME HE HAD THE PAIN AGAIN .  WANTED TO KNOW WHAT HE NEEDS TO DO AT THIS POINT. SAID HE IS FEELING FINE OTHERWISE.

## 2022-06-15 ENCOUNTER — OFFICE VISIT (OUTPATIENT)
Dept: FAMILY MEDICINE CLINIC | Facility: CLINIC | Age: 72
End: 2022-06-15

## 2022-06-15 ENCOUNTER — LAB (OUTPATIENT)
Dept: LAB | Facility: HOSPITAL | Age: 72
End: 2022-06-15

## 2022-06-15 VITALS
HEART RATE: 70 BPM | TEMPERATURE: 97.1 F | BODY MASS INDEX: 36.82 KG/M2 | WEIGHT: 277.8 LBS | SYSTOLIC BLOOD PRESSURE: 130 MMHG | DIASTOLIC BLOOD PRESSURE: 76 MMHG | OXYGEN SATURATION: 98 % | HEIGHT: 73 IN | RESPIRATION RATE: 16 BRPM

## 2022-06-15 DIAGNOSIS — Z00.00 MEDICARE ANNUAL WELLNESS VISIT, SUBSEQUENT: Primary | ICD-10-CM

## 2022-06-15 DIAGNOSIS — Z00.00 ROUTINE GENERAL MEDICAL EXAMINATION AT A HEALTH CARE FACILITY: ICD-10-CM

## 2022-06-15 DIAGNOSIS — R97.20 RAISED PROSTATE SPECIFIC ANTIGEN: ICD-10-CM

## 2022-06-15 PROBLEM — D68.59 HYPERCOAGULABLE STATE (HCC): Status: ACTIVE | Noted: 2018-01-03

## 2022-06-15 PROBLEM — N52.9 IMPOTENCE: Status: ACTIVE | Noted: 2022-06-15

## 2022-06-15 PROBLEM — E78.5 HYPERLIPIDEMIA: Status: ACTIVE | Noted: 2022-06-15

## 2022-06-15 PROBLEM — M17.9 OSTEOARTHRITIS OF KNEE: Status: ACTIVE | Noted: 2022-06-15

## 2022-06-15 PROBLEM — R73.09 BLOOD GLUCOSE ABNORMAL: Status: ACTIVE | Noted: 2022-06-15

## 2022-06-15 PROBLEM — K76.0 STEATOSIS OF LIVER: Status: ACTIVE | Noted: 2018-01-24

## 2022-06-15 PROBLEM — E78.5 DYSLIPIDEMIA: Status: ACTIVE | Noted: 2019-11-18

## 2022-06-15 PROBLEM — N28.1 SIMPLE RENAL CYST: Status: ACTIVE | Noted: 2018-01-24

## 2022-06-15 PROBLEM — N32.3 DIVERTICULUM OF BLADDER: Status: ACTIVE | Noted: 2018-01-24

## 2022-06-15 PROBLEM — Z86.73 HISTORY OF CEREBROVASCULAR ACCIDENT: Status: ACTIVE | Noted: 2022-06-15

## 2022-06-15 PROBLEM — I48.21 PERMANENT ATRIAL FIBRILLATION: Status: RESOLVED | Noted: 2019-11-18 | Resolved: 2022-06-15

## 2022-06-15 PROBLEM — Z79.01 LONG TERM CURRENT USE OF ANTICOAGULANT: Status: ACTIVE | Noted: 2018-01-03

## 2022-06-15 PROBLEM — I48.21 PERMANENT ATRIAL FIBRILLATION (HCC): Status: ACTIVE | Noted: 2019-11-18

## 2022-06-15 PROBLEM — D68.59 HYPERCOAGULABLE STATE (HCC): Status: RESOLVED | Noted: 2018-01-03 | Resolved: 2022-06-15

## 2022-06-15 PROBLEM — E66.9 OBESITY: Status: ACTIVE | Noted: 2019-11-06

## 2022-06-15 PROBLEM — I45.9 CONDUCTION DISORDER OF THE HEART: Status: ACTIVE | Noted: 2022-06-15

## 2022-06-15 PROBLEM — I10 BENIGN ESSENTIAL HYPERTENSION: Status: ACTIVE | Noted: 2019-11-18

## 2022-06-15 PROBLEM — D45 POLYCYTHEMIA VERA: Status: RESOLVED | Noted: 2022-06-15 | Resolved: 2022-06-15

## 2022-06-15 PROBLEM — K57.30 DIVERTICULAR DISEASE OF COLON: Status: ACTIVE | Noted: 2018-01-24

## 2022-06-15 PROBLEM — N40.0 BENIGN PROSTATIC HYPERPLASIA: Status: ACTIVE | Noted: 2018-01-24

## 2022-06-15 PROBLEM — D45 POLYCYTHEMIA VERA: Status: ACTIVE | Noted: 2022-06-15

## 2022-06-15 LAB
ALBUMIN SERPL-MCNC: 4.3 G/DL (ref 3.5–5.2)
ALBUMIN/GLOB SERPL: 2.2 G/DL
ALP SERPL-CCNC: 65 U/L (ref 39–117)
ALT SERPL W P-5'-P-CCNC: 47 U/L (ref 1–41)
ANION GAP SERPL CALCULATED.3IONS-SCNC: 12.9 MMOL/L (ref 5–15)
AST SERPL-CCNC: 39 U/L (ref 1–40)
BASOPHILS # BLD AUTO: 0.05 10*3/MM3 (ref 0–0.2)
BASOPHILS NFR BLD AUTO: 0.9 % (ref 0–1.5)
BILIRUB SERPL-MCNC: 1 MG/DL (ref 0–1.2)
BUN SERPL-MCNC: 13 MG/DL (ref 8–23)
BUN/CREAT SERPL: 13.7 (ref 7–25)
CALCIUM SPEC-SCNC: 9 MG/DL (ref 8.6–10.5)
CHLORIDE SERPL-SCNC: 106 MMOL/L (ref 98–107)
CHOLEST SERPL-MCNC: 184 MG/DL (ref 0–200)
CO2 SERPL-SCNC: 20.1 MMOL/L (ref 22–29)
CREAT SERPL-MCNC: 0.95 MG/DL (ref 0.76–1.27)
DEPRECATED RDW RBC AUTO: 43.3 FL (ref 37–54)
EGFRCR SERPLBLD CKD-EPI 2021: 85.6 ML/MIN/1.73
EOSINOPHIL # BLD AUTO: 0.08 10*3/MM3 (ref 0–0.4)
EOSINOPHIL NFR BLD AUTO: 1.4 % (ref 0.3–6.2)
ERYTHROCYTE [DISTWIDTH] IN BLOOD BY AUTOMATED COUNT: 13 % (ref 12.3–15.4)
GLOBULIN UR ELPH-MCNC: 2 GM/DL
GLUCOSE SERPL-MCNC: 109 MG/DL (ref 65–99)
HCT VFR BLD AUTO: 51.1 % (ref 37.5–51)
HDLC SERPL-MCNC: 43 MG/DL (ref 40–60)
HGB BLD-MCNC: 17.4 G/DL (ref 13–17.7)
IMM GRANULOCYTES # BLD AUTO: 0.02 10*3/MM3 (ref 0–0.05)
IMM GRANULOCYTES NFR BLD AUTO: 0.3 % (ref 0–0.5)
LDLC SERPL CALC-MCNC: 112 MG/DL (ref 0–100)
LDLC/HDLC SERPL: 2.53 {RATIO}
LYMPHOCYTES # BLD AUTO: 1.19 10*3/MM3 (ref 0.7–3.1)
LYMPHOCYTES NFR BLD AUTO: 20.8 % (ref 19.6–45.3)
MCH RBC QN AUTO: 31.4 PG (ref 26.6–33)
MCHC RBC AUTO-ENTMCNC: 34.1 G/DL (ref 31.5–35.7)
MCV RBC AUTO: 92.1 FL (ref 79–97)
MONOCYTES # BLD AUTO: 0.48 10*3/MM3 (ref 0.1–0.9)
MONOCYTES NFR BLD AUTO: 8.4 % (ref 5–12)
NEUTROPHILS NFR BLD AUTO: 3.9 10*3/MM3 (ref 1.7–7)
NEUTROPHILS NFR BLD AUTO: 68.2 % (ref 42.7–76)
NRBC BLD AUTO-RTO: 0 /100 WBC (ref 0–0.2)
PLATELET # BLD AUTO: 161 10*3/MM3 (ref 140–450)
PMV BLD AUTO: 11.3 FL (ref 6–12)
POTASSIUM SERPL-SCNC: 4.2 MMOL/L (ref 3.5–5.2)
PROT SERPL-MCNC: 6.3 G/DL (ref 6–8.5)
PSA SERPL-MCNC: 5.67 NG/ML (ref 0–4)
RBC # BLD AUTO: 5.55 10*6/MM3 (ref 4.14–5.8)
SODIUM SERPL-SCNC: 139 MMOL/L (ref 136–145)
TRIGL SERPL-MCNC: 162 MG/DL (ref 0–150)
TSH SERPL DL<=0.05 MIU/L-ACNC: 3.18 UIU/ML (ref 0.27–4.2)
VLDLC SERPL-MCNC: 29 MG/DL (ref 5–40)
WBC NRBC COR # BLD: 5.72 10*3/MM3 (ref 3.4–10.8)

## 2022-06-15 PROCEDURE — 1170F FXNL STATUS ASSESSED: CPT | Performed by: PHYSICIAN ASSISTANT

## 2022-06-15 PROCEDURE — G0439 PPPS, SUBSEQ VISIT: HCPCS | Performed by: PHYSICIAN ASSISTANT

## 2022-06-15 PROCEDURE — 84153 ASSAY OF PSA TOTAL: CPT

## 2022-06-15 PROCEDURE — 1125F AMNT PAIN NOTED PAIN PRSNT: CPT | Performed by: PHYSICIAN ASSISTANT

## 2022-06-15 PROCEDURE — 80061 LIPID PANEL: CPT

## 2022-06-15 PROCEDURE — 1159F MED LIST DOCD IN RCRD: CPT | Performed by: PHYSICIAN ASSISTANT

## 2022-06-15 PROCEDURE — 99397 PER PM REEVAL EST PAT 65+ YR: CPT | Performed by: PHYSICIAN ASSISTANT

## 2022-06-15 PROCEDURE — 80050 GENERAL HEALTH PANEL: CPT

## 2022-06-15 PROCEDURE — 36415 COLL VENOUS BLD VENIPUNCTURE: CPT

## 2022-06-15 NOTE — PROGRESS NOTES
The ABCs of the Annual Wellness Visit  Subsequent Medicare Wellness Visit    Chief Complaint   Patient presents with   • Medicare Wellness-subsequent     UTD on colonoscopy, vaccines. Sees cardio.       Subjective   History of Present Illness:  Vishal Dejesus is a 71 y.o. male who presents for a Subsequent Medicare Wellness Visit.    The following portions of the patient's history were reviewed and   updated as appropriate: allergies, current medications, past family history, past medical history, past social history, past surgical history and problem list.    Compared to one year ago, the patient feels his physical   health is the same.    Compared to one year ago, the patient feels his mental   health is the same.    Recent Hospitalizations:  He was not admitted to the hospital during the last year.       Current Medical Providers:  Patient Care Team:  Gagandeep Galaviz PA as PCP - General (Family Medicine)    Outpatient Medications Prior to Visit   Medication Sig Dispense Refill   • metoprolol succinate XL (TOPROL-XL) 25 MG 24 hr tablet Take 25 mg by mouth Daily.     • albuterol sulfate  (90 Base) MCG/ACT inhaler Inhale 2 puffs Every 4 (Four) Hours As Needed for Wheezing. 6.7 g 1   • apixaban (ELIQUIS) 5 MG tablet tablet Take 5 mg by mouth 2 (Two) Times a Day.     • XARELTO 20 MG tablet Take 20 mg by mouth Daily.  3   • CARTIA  MG 24 hr capsule Take 180 mg by mouth Daily.  0   • ciprofloxacin (Cipro) 500 MG tablet Take 1 tablet by mouth 2 (Two) Times a Day. Start taking the day prior to biopsy 6 tablet 0     No facility-administered medications prior to visit.       No opioid medication identified on active medication list. I have reviewed chart for other potential  high risk medication/s and harmful drug interactions in the elderly.          Aspirin is not on active medication list.  .    Patient Active Problem List   Diagnosis   • Obesity   • Long term current use of anticoagulant   • Benign  "essential hypertension   • Benign prostatic hyperplasia   • Blood glucose abnormal   • Conduction disorder of the heart   • Diverticular disease of colon   • Diverticulum of bladder   • Dyslipidemia   • History of cerebrovascular accident   • Hyperlipidemia   • Impotence   • Osteoarthritis of knee   • Raised prostate specific antigen   • Simple renal cyst   • Steatosis of liver   • Well adult health check     Advance Care Planning  has an advanced directive - a copy HAS NOT been provided. Have asked the patient to send this to us to add to record    Review of Systems   Constitutional: Negative.    HENT: Negative.    Eyes: Negative.    Respiratory: Negative.    Cardiovascular: Negative.    Gastrointestinal: Negative.    Endocrine: Negative.    Genitourinary: Negative.    Musculoskeletal: Negative.    Skin: Negative.    Allergic/Immunologic: Negative.    Neurological: Negative.    Hematological: Negative.    Psychiatric/Behavioral: Negative.    All other systems reviewed and are negative.        Objective      Vitals:    06/15/22 1006   BP: 130/76   Pulse: 70   Resp: 16   Temp: 97.1 °F (36.2 °C)   TempSrc: Infrared   SpO2: 98%   Weight: 126 kg (277 lb 12.8 oz)   Height: 185.4 cm (72.99\")   PainSc:   6   PainLoc: Knee     BMI Readings from Last 1 Encounters:   06/15/22 36.66 kg/m²   BMI is above normal parameters. Recommendations include: educational material and exercise counseling    Does the patient have evidence of cognitive impairment? No    Physical Exam  Vitals and nursing note reviewed.   Constitutional:       General: He is not in acute distress.     Appearance: Normal appearance. He is well-developed. He is not ill-appearing, toxic-appearing or diaphoretic.   HENT:      Head: Normocephalic and atraumatic.      Right Ear: External ear normal.      Left Ear: External ear normal.   Eyes:      Conjunctiva/sclera: Conjunctivae normal.      Pupils: Pupils are equal, round, and reactive to light.   Neck:      " Thyroid: No thyromegaly.      Vascular: No carotid bruit.   Cardiovascular:      Rate and Rhythm: Normal rate and regular rhythm.      Pulses: Normal pulses.      Heart sounds: Normal heart sounds. No murmur heard.  Pulmonary:      Effort: Pulmonary effort is normal. No respiratory distress.      Breath sounds: Normal breath sounds.   Abdominal:      General: Bowel sounds are normal.      Palpations: Abdomen is soft. There is no mass.      Tenderness: There is no abdominal tenderness.   Musculoskeletal:         General: No swelling. Normal range of motion.      Cervical back: Normal range of motion and neck supple.   Lymphadenopathy:      Cervical: No cervical adenopathy.   Skin:     General: Skin is warm and dry.      Findings: No lesion or rash.   Neurological:      Mental Status: He is alert and oriented to person, place, and time.      Cranial Nerves: No cranial nerve deficit.      Sensory: No sensory deficit.      Motor: No weakness.      Coordination: Coordination normal.      Gait: Gait normal.      Deep Tendon Reflexes: Reflexes are normal and symmetric.   Psychiatric:         Mood and Affect: Mood normal.         Behavior: Behavior normal.         Thought Content: Thought content normal.         Judgment: Judgment normal.                 HEALTH RISK ASSESSMENT    Smoking Status:  Social History     Tobacco Use   Smoking Status Never Smoker   Smokeless Tobacco Never Used     Alcohol Consumption:  Social History     Substance and Sexual Activity   Alcohol Use Yes    Comment: 5 drinks/week     Fall Risk Screen:    STEADI Fall Risk Assessment was completed, and patient is at LOW risk for falls.Assessment completed on:6/15/2022    Depression Screening:  PHQ-2/PHQ-9 Depression Screening 6/15/2022   Little Interest or Pleasure in Doing Things 0-->not at all   Feeling Down, Depressed or Hopeless 0-->not at all   PHQ-9: Brief Depression Severity Measure Score 0       Health Habits and Functional and Cognitive  Screening:  Functional & Cognitive Status 6/15/2022   Do you have difficulty preparing food and eating? No   Do you have difficulty bathing yourself, getting dressed or grooming yourself? No   Do you have difficulty using the toilet? No   Do you have difficulty moving around from place to place? No   Do you have trouble with steps or getting out of a bed or a chair? No   Current Diet Well Balanced Diet   Dental Exam Up to date   Eye Exam Up to date   Exercise (times per week) 4 times per week   Current Exercises Include Bicycling Outdoors        Exercise Comment golf   Do you need help using the phone?  No   Are you deaf or do you have serious difficulty hearing?  No   Do you need help with transportation? No   Do you need help shopping? No   Do you need help preparing meals?  No   Do you need help with housework?  No   Do you need help with laundry? No   Do you need help taking your medications? No   Do you need help managing money? No   Do you ever drive or ride in a car without wearing a seat belt? No   Have you felt unusual stress, anger or loneliness in the last month? No   Who do you live with? Spouse   If you need help, do you have trouble finding someone available to you? No   Have you been bothered in the last four weeks by sexual problems? No   Do you have difficulty concentrating, remembering or making decisions? No       Age-appropriate Screening Schedule:  Refer to the list below for future screening recommendations based on patient's age, sex and/or medical conditions. Orders for these recommended tests are listed in the plan section. The patient has been provided with a written plan.    Health Maintenance   Topic Date Due   • LIPID PANEL  06/15/2022   • TDAP/TD VACCINES (1 - Tdap) 06/15/2023 (Originally 9/29/1969)   • ZOSTER VACCINE (1 of 2) 06/15/2023 (Originally 9/29/2000)   • INFLUENZA VACCINE  10/01/2022              Assessment & Plan     CMS Preventative Services Quick Reference  Risk Factors  Identified During Encounter  Cardiovascular Disease  elevated psa  The above risks/problems have been discussed with the patient.  Follow up actions/plans if indicated are seen below in the Assessment/Plan Section.  Pertinent information has been shared with the patient in the After Visit Summary.    Diagnoses and all orders for this visit:    1. Medicare annual wellness visit, subsequent (Primary)    2. Routine general medical examination at a health care facility  -     Comprehensive Metabolic Panel; Future  -     Lipid Panel; Future  -     CBC & Differential; Future  -     TSH; Future    3. Raised prostate specific antigen  -     PSA DIAGNOSTIC; Future  -     Ambulatory Referral to Urology    Other orders  -     apixaban (ELIQUIS) 5 MG tablet tablet; Take 1 tablet by mouth 2 (Two) Times a Day.  Dispense: 60 tablet; Refill: 6      Preventive medicine discussed, diet, exercise, healthy living discussed at length.  Discussed nutrition, physical activity, healthy weight, injury prevention, misuse of tobacco, alcohol and drugs, dental health, mental health, immunizations, screening    Part of this note may be an electronic transcription/translation of spoken language to printed text using the Dragon Dictation System.    Follow Up:  No follow-ups on file.     An After Visit Summary and PPPS were given to the patient.

## 2022-07-01 ENCOUNTER — TELEPHONE (OUTPATIENT)
Dept: FAMILY MEDICINE CLINIC | Facility: CLINIC | Age: 72
End: 2022-07-01

## 2022-07-01 NOTE — TELEPHONE ENCOUNTER
Hub staff attempted to follow warm transfer process and was unsuccessful     Caller: Vishal Dejesus    Relationship to patient: Self    Best call back number: [phone]    Patient is needing: PATIENT IS CALLING TO STATE THAT HE IS HAVING SIDE PAIN AND WANTED TO SEE IF HE COULD BE WORKED IN TODAY.  PLEASE CALL AND ADVISE.

## 2022-07-28 ENCOUNTER — OFFICE VISIT (OUTPATIENT)
Dept: UROLOGY | Facility: CLINIC | Age: 72
End: 2022-07-28

## 2022-07-28 VITALS — BODY MASS INDEX: 36.66 KG/M2 | HEIGHT: 73 IN

## 2022-07-28 DIAGNOSIS — R33.9 INCOMPLETE BLADDER EMPTYING: Primary | ICD-10-CM

## 2022-07-28 DIAGNOSIS — R97.20 ELEVATED PROSTATE SPECIFIC ANTIGEN (PSA): ICD-10-CM

## 2022-07-28 LAB
BILIRUB BLD-MCNC: NEGATIVE MG/DL
CLARITY, POC: CLEAR
COLOR UR: YELLOW
EXPIRATION DATE: ABNORMAL
GLUCOSE UR STRIP-MCNC: NEGATIVE MG/DL
KETONES UR QL: NEGATIVE
LEUKOCYTE EST, POC: NEGATIVE
Lab: ABNORMAL
NITRITE UR-MCNC: NEGATIVE MG/ML
PH UR: 6 [PH] (ref 5–8)
PROT UR STRIP-MCNC: ABNORMAL MG/DL
RBC # UR STRIP: NEGATIVE /UL
SP GR UR: 1.02 (ref 1–1.03)
UROBILINOGEN UR QL: NORMAL

## 2022-07-28 PROCEDURE — 99215 OFFICE O/P EST HI 40 MIN: CPT | Performed by: STUDENT IN AN ORGANIZED HEALTH CARE EDUCATION/TRAINING PROGRAM

## 2022-07-28 PROCEDURE — 51798 US URINE CAPACITY MEASURE: CPT | Performed by: STUDENT IN AN ORGANIZED HEALTH CARE EDUCATION/TRAINING PROGRAM

## 2022-07-28 PROCEDURE — 81003 URINALYSIS AUTO W/O SCOPE: CPT | Performed by: STUDENT IN AN ORGANIZED HEALTH CARE EDUCATION/TRAINING PROGRAM

## 2022-07-28 NOTE — PROGRESS NOTES
Elevated PSA Office Visit      Patient Name: Vishal Dejesus  : 1950   MRN: [id number]     Chief Complaint:  Elevated PSA.    Chief Complaint   Patient presents with   • Elevated PSA       Referring Provider: No ref. provider found    History of Present Illness: Vishal Dejesus is a 71 y.o. male who presents today with history of elevated PSA.  The patient has a history of atrial fibrillation on Eliquis, arthritis, hyperlipidemia, hypertension and obesity.  He has previously followed with Ishmael Mcallister MD, urology at [facility].  He was originally evaluated .  His PSA at that time was 5.79.  For further risk assessment, a 4K score was ordered and this resulted significantly elevated at 53%.  The patient was counseled to proceed with prostate biopsy, this was never scheduled.  Patient discontinued follow-up with Dr. Ishmael Mcallister.      Lab Results   Component Value Date    PSA 5.670 (H) 06/15/2022    PSA 5.790 (H) 2021    PSA 4.700 (H) 10/29/2019      The patient has since repeated a PSA which came back fairly stable at 5.67.    Digital rectal examination today without significant nodularity or induration.    Patient states he is hesitant to undergo any unnecessary biopsy or any surgical interventions.  He states he has friends who have had their prostates removed, he also reports close relationship with previous primary care provider who seem to suggest that every management would develop prostate cancer at some point in their lives, patient reports he is not overly bothered by the risk of undiagnosed malignancy.    He does have mild lower urinary tract symptoms, IPSS score is 6.    Concerningly, the patient's postvoid residual bladder scan was 685 mL.  In discussion with the patient he reports he only urinated a small sample for the urine specimen.    The patient was asked to urinate in the restroom again, a second postvoid residual bladder scan is 589 mL.     Urinalysis negative  today.      Subjective      Review of System: Review of Systems   Constitutional: Negative for chills, fatigue, fever and unexpected weight change.   HENT: Negative for sore throat.    Eyes: Negative for visual disturbance.   Respiratory: Negative for cough, chest tightness and shortness of breath.    Cardiovascular: Negative for chest pain and leg swelling.   Gastrointestinal: Negative for blood in stool, constipation, diarrhea, nausea, rectal pain and vomiting.   Genitourinary: Negative for decreased urine volume, difficulty urinating, dysuria, enuresis, flank pain, frequency, genital sores, hematuria and urgency.   Musculoskeletal: Negative for back pain and joint swelling.   Skin: Negative for rash and wound.   Neurological: Negative for seizures, speech difficulty, weakness and headaches.   Psychiatric/Behavioral: Negative for confusion, sleep disturbance and suicidal ideas. The patient is not nervous/anxious.       I have reviewed the ROS documented by my clinical staff, I have updated appropriately and I agree. Adam Nguyen MD    Past Medical History:   Past Medical History:   Diagnosis Date   • A-fib (HCC)    • Arthritis    • Hyperlipidemia    • Hypertension    • Obesity        Past Surgical History:   Past Surgical History:   Procedure Laterality Date   • APPENDECTOMY  1986   • CARDIAC ABLATION  2013   • KNEE SURGERY         Family History:   Family History   Problem Relation Age of Onset   • Hypertension Mother    • Stroke Mother    • Hypertension Sister    • Diabetes Sister        Social History:   Social History     Socioeconomic History   • Marital status:    Tobacco Use   • Smoking status: Never Smoker   • Smokeless tobacco: Never Used   Vaping Use   • Vaping Use: Never used   Substance and Sexual Activity   • Alcohol use: Yes     Comment: 5 drinks/week   • Drug use: No   • Sexual activity: Not Currently       Medications:     Current Outpatient Medications:   •  apixaban (ELIQUIS) 5 MG  tablet tablet, Take 1 tablet by mouth 2 (Two) Times a Day., Disp: 60 tablet, Rfl: 6  •  CARTIA  MG 24 hr capsule, Take 180 mg by mouth Daily., Disp: , Rfl: 0  •  metoprolol succinate XL (TOPROL-XL) 25 MG 24 hr tablet, Take 25 mg by mouth Daily., Disp: , Rfl:     Allergies:   No Known Allergies    IPSS Questionnaire (AUA-7):  Over the past month…    1)  Incomplete Emptying:       How often have you had a sensation of not emptying you had the sensation of not emptying your bladder completely after you finished urinating?  1 - Less than 1 time in 5   2)  Frequency:       How often have you had the urinate again less than two hours after you finished urinating?  3 - About half the time   3)  Intermittency:       How often have you found you stopped and started again several times when you urinated?   0 - Not at all   4) Urgency:      How often have you found it difficult to postpone urination?  0 - Not at all   5) Weak Stream:      How often have you had a weak urinary stream?  0 - Not at all   6) Straining:       How often have you had to push or strain to begin urination?  0 - Not at all   7) Nocturia:      How many times did you most typically get up to urinate from the time you went to bed at night until the time you got up in the morning?  2 - 2 times   Total Score:  6   The International Prostate Symptom Score (IPSS) is used to screen, diagnose, track symptoms of benign prostatic hyperplasia (BPH).   0-7 (Mild Symptoms) 8-19 (Moderate) 20-35 (Severe)   Quality of Life (QoL):  If you were to spend the rest of your life with your urinary condition just the way it is now, how would you feel about that? 2-Mostly Satisfied   Urine Leakage (Incontinence) 0-No Leakage       Sexual Health Inventory for Men (DASH)   Over the past 6 months:     1. How do you rate your confidence that you could get and keep an erection?  2 - Low   2. When you had erections with sexual    stimulation, how often were your erections hard  "enough for penetration (entering your partner)?  0 - No Sexual Activity    3. During sexual intercourse, how often were you able to maintain your erection after you had penetrated (entered) your partner?  0 - Did not attempt intercourse   4. During sexual intercourse, how difficult was it to maintain your erection to completion of intercourse?  0 - Did not attempt intercourse   5. When you attempted sexual intercourse, how often was it satisfactory for you?  0 - Did not attempt intercourse    Total Score: 2   The Sexual Health Inventory for Men further classifies ED severity with the following breakpoints:   1-7 (Severe ED) 8-11 (Moderate ED) 12-16 (Mild to Moderate ED) 17-21 (Mild ED)      Post void residual bladder scan:   685 mL       Objective     Physical Exam:   Vital Signs:   Vitals:    07/28/22 1309   Height: 185.4 cm (72.99\")     Body mass index is 36.66 kg/m².     Physical Exam  Constitutional:       Appearance: Normal appearance. He is obese.   HENT:      Head: Normocephalic and atraumatic.      Nose: Nose normal.   Eyes:      Extraocular Movements: Extraocular movements intact.      Conjunctiva/sclera: Conjunctivae normal.      Pupils: Pupils are equal, round, and reactive to light.   Genitourinary:     Comments: Circumcised, buried phallus secondary to obesity, testicles are difficult to palpate secondary to loss of scrotal differentiation.  Digital rectal examination demonstrates a 40+ grams prostate without nodules or induration.  Musculoskeletal:         General: Normal range of motion.      Cervical back: Normal range of motion and neck supple.   Skin:     General: Skin is warm and dry.      Findings: No lesion or rash.   Neurological:      General: No focal deficit present.      Mental Status: He is alert and oriented to person, place, and time. Mental status is at baseline.   Psychiatric:         Mood and Affect: Mood normal.         Behavior: Behavior normal.         Labs:   Hematocrit (%)   Date " Value   06/15/2022 51.1 (H)   10/29/2019 50.2       Lab Results   Component Value Date    PSA 5.670 (H) 06/15/2022    PSA 5.790 (H) 02/24/2021    PSA 4.700 (H) 10/29/2019       Images:   No Images in the past 120 days found..    Measures:   Tobacco:   Vishal Dejesus  reports that he has never smoked. He has never used smokeless tobacco.                Assessment / Plan      Assessment/Plan:   Mr. Dejesus is a 71 y.o. male with elevated PSA and possible incomplete bladder emptying.    BPH with incomplete bladder emptying  In regards to urinary symptoms, patient symptoms are mild however he was found to have 600+ mL on first postvoid residual bladder scan.  He was asked to urinate for second time and this resulted at 589 mL.  I discussed with the patient only way to confirm whether this is an accurate number would be to pass a clean intermittent catheter today to measure his residual volume.  The patient is concerned about this and he elects to defer catheterization.  Discussed we can continue to further work-up is likely incomplete bladder emptying and initiate empiric tamsulosin or further work-up with flexible cystoscopy, he would like to defer at this time.    I discussed my concerns regarding his incomplete bladder emptying which raises his risk of chronic renal dysfunction, urinary tract infection, bladder stone development, hematuria etc.    Elevated PSA  I had a very long detailed discussion with the patient today regarding elevated PSA, AUA guidelines regarding prostate cancer screening.  Patient has had a previous 4K score which is an appropriate test to determine risk of undiagnosed prostate cancer malignancy, this was elevated at 52%.  This would be placing the patient at high risk for undiagnosed cancer that needs treatment.  Patient has decided to discontinue follow-up with his previous urologist and he is still hesitant to undergo a prostate biopsy for a variety of reasons.  He is amenable however to MRI  prostate to look at targetable high risk lesions that may warrant prostate biopsy.  We will plan for MRI prostate next available and I will call him with results.        Diagnoses and all orders for this visit:      1. Elevated prostate specific antigen (PSA)    -MRI prostate, will call with results and next steps    -     POC Urinalysis Dipstick, Automated  -     MRI Prostate With & Without Contrast; Future    2. Incomplete bladder emptying (Primary)   -Severely elevated bladder volumes greater than 600 mL  -Discussed risks of chronic renal function decline, infection, hematuria, bladder stone development etc.  -Complete work-up would first require catheterized urine volume to ensure this is a real value, patient is deciding to avoid this at this time despite risks discussed  -We will readdress is likely BPH and incomplete bladder emptying at subsequent visits pending MRI prostate      Follow Up:   No follow-ups on file.    I spent approximately 40 minutes providing clinical care for this patient; including review of patient's chart and provider documentation, face to face time spent with patient in examination room (obtaining history, performing physical exam, discussing diagnosis and management options), placing orders, and completing patient documentation.     Adam Nguyen MD  [facility] [place]

## 2022-09-08 ENCOUNTER — APPOINTMENT (OUTPATIENT)
Dept: MRI IMAGING | Facility: HOSPITAL | Age: 72
End: 2022-09-08

## 2022-10-14 ENCOUNTER — HOSPITAL ENCOUNTER (OUTPATIENT)
Dept: MRI IMAGING | Facility: HOSPITAL | Age: 72
Discharge: HOME | End: 2022-10-14

## 2022-10-14 DIAGNOSIS — R97.20 ELEVATED PROSTATE SPECIFIC ANTIGEN (PSA): ICD-10-CM

## 2023-01-12 RX ORDER — OSELTAMIVIR PHOSPHATE 75 MG/1
75 CAPSULE ORAL 2 TIMES DAILY
Qty: 10 CAPSULE | Refills: 0 | Status: SHIPPED | OUTPATIENT
Start: 2023-01-12

## 2024-10-11 ENCOUNTER — TRANSCRIBE ORDERS (OUTPATIENT)
Dept: GENERAL RADIOLOGY | Facility: HOSPITAL | Age: 74
End: 2024-10-11
Payer: MEDICARE

## 2024-10-11 ENCOUNTER — HOSPITAL ENCOUNTER (OUTPATIENT)
Dept: GENERAL RADIOLOGY | Facility: HOSPITAL | Age: 74
Discharge: HOME | End: 2024-10-11
Admitting: FAMILY MEDICINE
Payer: MEDICARE

## 2024-10-11 DIAGNOSIS — M89.9 LESION OF LEFT FEMUR: ICD-10-CM

## 2024-10-11 DIAGNOSIS — M89.9 LESION OF LEFT FEMUR: Primary | ICD-10-CM

## 2024-10-11 PROCEDURE — 73552 X-RAY EXAM OF FEMUR 2/>: CPT

## 2024-11-21 ENCOUNTER — OFFICE VISIT (OUTPATIENT)
Dept: UROLOGY | Facility: CLINIC | Age: 74
End: 2024-11-21
Payer: COMMERCIAL

## 2024-11-21 VITALS — HEART RATE: 80 BPM | OXYGEN SATURATION: 94 % | DIASTOLIC BLOOD PRESSURE: 87 MMHG | SYSTOLIC BLOOD PRESSURE: 128 MMHG

## 2024-11-21 DIAGNOSIS — R97.20 ELEVATED PROSTATE SPECIFIC ANTIGEN (PSA): Primary | ICD-10-CM

## 2024-11-21 DIAGNOSIS — N40.1 BPH WITH OBSTRUCTION/LOWER URINARY TRACT SYMPTOMS: ICD-10-CM

## 2024-11-21 DIAGNOSIS — N32.3 ACQUIRED BLADDER DIVERTICULUM: ICD-10-CM

## 2024-11-21 DIAGNOSIS — N13.8 BPH WITH OBSTRUCTION/LOWER URINARY TRACT SYMPTOMS: ICD-10-CM

## 2024-11-21 DIAGNOSIS — R33.9 INCOMPLETE BLADDER EMPTYING: ICD-10-CM

## 2024-11-21 PROCEDURE — 87081 CULTURE SCREEN ONLY: CPT | Performed by: STUDENT IN AN ORGANIZED HEALTH CARE EDUCATION/TRAINING PROGRAM

## 2024-11-21 RX ORDER — RIVAROXABAN 20 MG/1
1 TABLET, FILM COATED ORAL DAILY
COMMUNITY
Start: 2024-09-13

## 2024-11-21 RX ORDER — LOSARTAN POTASSIUM 50 MG/1
50 TABLET ORAL DAILY
COMMUNITY
Start: 2024-09-20

## 2024-11-21 RX ORDER — LEVOFLOXACIN 500 MG/1
500 TABLET, FILM COATED ORAL DAILY
Qty: 3 TABLET | Refills: 0 | Status: SHIPPED | OUTPATIENT
Start: 2024-12-08 | End: 2024-12-11

## 2024-11-21 RX ORDER — MAGNESIUM HYDROXIDE 1200 MG/15ML
1 LIQUID ORAL ONCE
Qty: 1 ENEMA | Refills: 0 | Status: SHIPPED | OUTPATIENT
Start: 2024-11-21 | End: 2024-11-21

## 2024-11-21 RX ORDER — METOPROLOL TARTRATE 50 MG
1 TABLET ORAL DAILY
COMMUNITY

## 2024-11-21 NOTE — PROGRESS NOTES
Elevated PSA Office Visit      Patient Name: Vishal Dejesus  : 1950   MRN: [id number]     Chief Complaint:  Elevated PSA.    Chief Complaint   Patient presents with    Elevated PSA       Referring Provider: Orlando Curtis,*    History of Present Illness: Vishal Dejesus is a 74 y.o. male who presents today with history of elevated PSA and severe incomplete bladder emptying.  The patient was originally evaluated 2022.  He has a history of persistently elevated and rising PSA with concerning 4K score.  MRI prostate was ordered and recommended.  Patient did not complete his MRI prostate, he was then lost to follow-up with me.  States he never followed up.  At that visit he was found to have a very high PVR at 685 cc.  After his second urination the patient's PVR was 589 cc.  This is concerning for chronic bladder outlet obstruction.    Since I last saw the patient he was reevaluated by PCP.  PSA was recently 7.3 drawn in September.    Patient was then ordered to undergo prostate MRI.  A 1.5 Kirstin MRI was performed at the [facility], imaging will be uploaded to the system.  Report indicates a large PI-RADS 5 lesion measuring roughly 2 cm appearing to extend into the left seminal vesicle from the left posterior peripheral zone at the base of the prostate.  There was also made mention of a focal lesion within the left femoral head which was indeterminate.  He had a follow-up femur x-ray demonstrating no obvious findings.  The patient's MRI report also indicates a severely distended urinary bladder, BPH and multifocal bladder diverticuli.  The patient has since started Rapaflo and states he is urinating with a stronger stream, IPSS 8 however his PA VR today is 565 cc.  He denies gross hematuria or UTI symptoms.    Lab Results   Component Value Date    PSA 5.670 (H) 06/15/2022    PSA 5.790 (H) 2021    PSA 4.700 (H) 10/29/2019         Subjective      Review of System: Review of  Systems   Genitourinary: Negative.  Positive for difficulty urinating, frequency and urgency.      I have reviewed the ROS documented by my clinical staff, I have updated appropriately and I agree. Adam Nguyen MD    Past Medical History:   Past Medical History:   Diagnosis Date    A-fib     Arthritis     Hyperlipidemia     Hypertension     Obesity        Past Surgical History:   Past Surgical History:   Procedure Laterality Date    APPENDECTOMY  1986    CARDIAC ABLATION  2013    KNEE SURGERY         Family History:   Family History   Problem Relation Age of Onset    Hypertension Mother     Stroke Mother     Hypertension Sister     Diabetes Sister        Social History:   Social History     Socioeconomic History    Marital status:    Tobacco Use    Smoking status: Never    Smokeless tobacco: Never   Vaping Use    Vaping status: Never Used   Substance and Sexual Activity    Alcohol use: Yes     Comment: 5 drinks/week    Drug use: No    Sexual activity: Not Currently       Medications:     Current Outpatient Medications:     CARTIA  MG 24 hr capsule, Take 1 capsule by mouth Daily., Disp: , Rfl: 0    losartan (COZAAR) 50 MG tablet, Take 1 tablet by mouth Daily. for blood pressure, Disp: , Rfl:     metoprolol tartrate (LOPRESSOR) 50 MG tablet, Take 1 tablet by mouth Daily., Disp: , Rfl:     Silodosin (RAPAFLO PO), Take 8 mg by mouth Daily., Disp: , Rfl:     Xarelto 20 MG tablet, Take 1 tablet by mouth Daily., Disp: , Rfl:     [START ON 12/8/2024] levoFLOXacin (LEVAQUIN) 500 MG tablet, Take 1 tablet by mouth Daily for 3 days., Disp: 3 tablet, Rfl: 0    oseltamivir (Tamiflu) 75 MG capsule, Take 1 capsule by mouth 2 (Two) Times a Day. (Patient not taking: Reported on 11/21/2024), Disp: 10 capsule, Rfl: 0    sodium phosphate (FLEET) 7-19 GM/118ML enema, Insert 1 enema into the rectum 1 (One) Time for 1 dose., Disp: 1 enema, Rfl: 0    Allergies:   No Known Allergies    IPSS Questionnaire (AUA-7):  Over  the past month…    1)  Incomplete Emptying:       How often have you had a sensation of not emptying you had the sensation of not emptying your bladder completely after you finished urinating?  3 - About half the time   2)  Frequency:       How often have you had the urinate again less than two hours after you finished urinating?  1 - Less than 1 time in 5   3)  Intermittency:       How often have you found you stopped and started again several times when you urinated?   0 - Not at all   4) Urgency:      How often have you found it difficult to postpone urination?  2 - Less than half the time   5) Weak Stream:      How often have you had a weak urinary stream?  0 - Not at all   6) Straining:       How often have you had to push or strain to begin urination?  0 - Not at all   7) Nocturia:      How many times did you most typically get up to urinate from the time you went to bed at night until the time you got up in the morning?  2 - 2 times   Total Score:  8   The International Prostate Symptom Score (IPSS) is used to screen, diagnose, track symptoms of benign prostatic hyperplasia (BPH).   0-7 (Mild Symptoms) 8-19 (Moderate) 20-35 (Severe)   Quality of Life (QoL):  If you were to spend the rest of your life with your urinary condition just the way it is now, how would you feel about that? 0-Delighted   Urine Leakage (Incontinence) 0-No Leakage           Post void residual bladder scan:   565 mL      Objective     Physical Exam:   Vital Signs:   Vitals:    11/21/24 1543   BP: 128/87   Pulse: 80   SpO2: 94%     There is no height or weight on file to calculate BMI.     Physical Exam  Vitals and nursing note reviewed.   Constitutional:       Appearance: Normal appearance.   HENT:      Head: Normocephalic and atraumatic.      Mouth/Throat:      Mouth: Mucous membranes are moist.      Pharynx: Oropharynx is clear.   Eyes:      Extraocular Movements: Extraocular movements intact.      Conjunctiva/sclera: Conjunctivae  normal.   Cardiovascular:      Rate and Rhythm: Normal rate and regular rhythm.   Pulmonary:      Effort: Pulmonary effort is normal. No respiratory distress.   Abdominal:      Palpations: Abdomen is soft.      Tenderness: There is no abdominal tenderness. There is no right CVA tenderness or left CVA tenderness.   Genitourinary:     Comments: Circumcised phallus, orthotopic meatus, bilaterally descended testicles without masses, or lesions.     LUIS E: Normal rectal tone, no blood, estimated 50 gram prostate without nodularity or firmness.  This examination is limited by the patient's body habitus, unable to palpate the entire prostate.  Musculoskeletal:         General: Normal range of motion.      Cervical back: Normal range of motion.   Skin:     General: Skin is warm and dry.   Neurological:      General: No focal deficit present.      Mental Status: He is alert and oriented to person, place, and time.   Psychiatric:         Mood and Affect: Mood normal.         Behavior: Behavior normal.         Labs:   Hematocrit (%)   Date Value   06/15/2022 51.1 (H)   10/29/2019 50.2       Lab Results   Component Value Date    PSA 5.670 (H) 06/15/2022    PSA 5.790 (H) 02/24/2021    PSA 4.700 (H) 10/29/2019       Images:   XR Femur 2 View Left    Result Date: 10/14/2024  1. No appreciable left femur or left thigh soft tissue mass is identified. Outside comparison MRI reported images are not available. Mild degenerative changes of the left hip. 3. Left knee replacement. Electronically Signed: Lashon Higuera MD  10/14/2024 1:36 PM EDT  Workstation ID: [id number]    CT Angiogram Coronary    Result Date: 10/4/2024  1. Mild coronary calcification with an Agatston score = 111 using the AJ-130 method, which represents 32 percentile when matched for age, gender and ethnicity.  Vascular age is 71 years. 2. 47 mm aneurysm of the ascending aorta. 3. CAD-RADS 1: Management recommendations:  Consider non-atherosclerotic causes of chest pain.  Consider referral for outpatient follow-up for preventative therapy and risk factor modification.   4.  Tiny ASD. 5.  Possible bronchiolitis. CRITICAL RESULT: No. COMMUNICATION: Per this written report. By electronically signing this report, I, the attending physician, attest that I have personally reviewed the images/data for the above examination(s) and agree with the final edited report. Drafted by Kris Chan MD on 10/4/2024 8:35 AM Final report signed by Yanelis Da Silva MD on 10/4/2024 10:15 AM      Measures:   Tobacco:   Vishal Dejesus  reports that he has never smoked. He has never used smokeless tobacco.      Assessment / Plan      Assessment/Plan:   Mr. Dejesus is a 74 y.o. male with severely elevated PVR, elevated PSA, MRI prostate with concerning findings with high likelihood of undiagnosed prostate cancer with possible SV invasion.  Indeterminant finding in the left femoral head.  Recommend ASAP prostate biopsy.  Pretreat with fleets enema and Levaquin 500 mg prior, hold Xarelto 48 hours prior to biopsy.  Rectal swab sent today to assess for resistant organisms.  He has severe include bladder emptying likely as a result of chronic and untreated BPH.  He has no UTI symptoms at this time.  We will have to assess options for his incomplete bladder emptying which may include intermittent catheterization or bladder outlet therapy however workup for prostate cancer takes precedence at this time.  He reports understanding.  Risks of biopsy include bleeding in urine, stool, ejaculate, sepsis in 3%.      Diagnoses and all orders for this visit:    1. Elevated prostate specific antigen (PSA) (Primary)  -     Fluoroquinolone-resistant Gram-negative Wilfrid Detection Culture - Swab, Large Intestine, Rectum; Future  -     levoFLOXacin (LEVAQUIN) 500 MG tablet; Take 1 tablet by mouth Daily for 3 days.  Dispense: 3 tablet; Refill: 0  -     sodium phosphate (FLEET) 7-19 GM/118ML enema; Insert 1 enema into the rectum 1 (One)  Time for 1 dose.  Dispense: 1 enema; Refill: 0    2. Incomplete bladder emptying    3. Acquired bladder diverticulum    4. BPH with obstruction/lower urinary tract symptoms              Follow Up:   Return in about 19 days (around 12/10/2024) for TRUS prostate biopsy 12/10 .    I spent approximately 35 minutes providing clinical care for this patient; including review of patient's chart and provider documentation, face to face time spent with patient in examination room (obtaining history, performing physical exam, discussing diagnosis and management options), placing orders, and completing patient documentation.     Adam Nguyen MD  [facility] [place]

## 2024-11-21 NOTE — PATIENT INSTRUCTIONS
"Given your elevated PSA and MRI findings, I recommend proceeding with prostate biopsy to evaluate for possibility of prostate cancer.     Prostate biopsies are performed with a transrectal ultrasound probe, you will be awake for this procedure.  I recommend you bring along a  if possible on the day of your biopsy.  We will numb up the prostate with local anesthetic prior to biopsy, and at least 12 total prostate samples will be obtained for pathologic analysis.      The biopsy typically takes less than 10 minutes to perform.    To prevent bloodstream infection (2 to 4% risk), you will need to start taking Levofloxacin (Levaquin) two days prior to your biopsy.  This was sent to your pharmacy.    A \"Fleets\" rectal enema (can be obtained over the counter) the morning of your biopsy is recommended to clear out the rectum of stool.    Hold your Xarelto 48 hours prior to biopsy    Risks of prostate biopsy include bleeding in the urine for a few days, blood in the stool for your first few bowel movements after biopsy, fever/chills/sepsis (2-4%), and small amounts of blood in the semen that can last for a few weeks in some cases (however this is not harmful to you or your partner).    Please let me know if you have any questions or concerns prior to your biopsy.    Thank you,     Adam Nguyen MD  [facility]    "

## 2024-11-26 LAB
BACTERIA ANAL CULT: NORMAL
BACTERIA ANAL CULT: NORMAL

## 2024-12-10 ENCOUNTER — PROCEDURE VISIT (OUTPATIENT)
Dept: UROLOGY | Facility: CLINIC | Age: 74
End: 2024-12-10
Payer: COMMERCIAL

## 2024-12-10 VITALS — OXYGEN SATURATION: 97 % | DIASTOLIC BLOOD PRESSURE: 85 MMHG | SYSTOLIC BLOOD PRESSURE: 141 MMHG | HEART RATE: 100 BPM

## 2024-12-10 DIAGNOSIS — R97.20 ELEVATED PROSTATE SPECIFIC ANTIGEN (PSA): Primary | ICD-10-CM

## 2024-12-10 NOTE — PROGRESS NOTES
Preprocedure diagnosis  Elevated PSA    Postprocedure diagnosis  Elevated PSA    Procedure  Transrectal ultrasound-guided prostate biopsy, local prostate block with 1% lidocaine injection    Attending surgeon  Adam Nguyen MD    Anesthesia  1% Lidocaine prostate block    Complications  None    Indications  74 y.o. malewho has a history of elevated PSA who presents today for transrectal ultrasound-guided prostate biopsy after discussion of risks, benefits, alternatives.  Informed consent was obtained.  Patient has taken his Levaquin antibiotic pre-procedure as instructed and completed an enema prior to his biopsy.    Findings  -Digital rectal examination = difficult to palpate given obesity  -Prostate volume measurements = 91 cc volume  -Total number of biopsy cores= 17    Most recent PSA 7.3.     Prostate MRI demonstrates 2 CM PIRADS5 lesion involving the left base with SV invasion suspected.         Lab Results   Component Value Date    PSA 5.670 (H) 06/15/2022    PSA 5.790 (H) 02/24/2021    PSA 4.700 (H) 10/29/2019         Procedure   The patient was positioned and prepped in a left lateral position with lower extremities flexed.       A digital rectal exam was performed, the patient's prostate is overall fairly smooth but I am unable to palpate the entire gland due to his obesity.    The rectal ultrasound probe was slowly introduced into the rectum without difficulty.  The prostate and seminal vesicles were inspected systematically using axial and sagittal views with the ultrasound.      The dimensions of the prostate were measured, for a calculated volume of 90 mL.     Next 5 cc of 1% lidocaine was injected at the right and left neurovascular bundles at the junction of the seminal vesicles bilaterally.  Next using a true cut biopsy needle, 17 prostate cores were collected. The specific locations were the following: left lateral base, left lateral mid, left lateral apex, left medial base, left medial mid,  and left medial apex, right lateral base, right lateral mid, right lateral apex, right medial base, right medial mid, right medial apex, and right and left transition zones.  The rectal ultrasound probe was held in place for 2 minutes for hemostasis.  The rectal ultrasound probe was removed.  A LUIS E was again performed revealing little blood in the rectum.  The patient tolerated the procedure well.     Disposition/Follow Up:     1.  The patient was instructed to drink plenty of fluids and warned about possible complications and side effects including, but not limited to, blood in the urine, stool and semen as well as bloodstream infection.  He was instructed to call the office if there are any issues, especially fevers or flu-like symptoms.    2. Complete 3 day antibiotic course as prescribed.   3.  F/u 1 week in clinic to review pathology.     Adam Nguyen MD  [facility]

## 2024-12-11 LAB — REF LAB TEST METHOD: NORMAL

## 2024-12-17 ENCOUNTER — OFFICE VISIT (OUTPATIENT)
Dept: UROLOGY | Facility: CLINIC | Age: 74
End: 2024-12-17
Payer: MEDICARE

## 2024-12-17 DIAGNOSIS — N40.1 BPH WITH OBSTRUCTION/LOWER URINARY TRACT SYMPTOMS: ICD-10-CM

## 2024-12-17 DIAGNOSIS — N40.1 URINARY RETENTION DUE TO BENIGN PROSTATIC HYPERPLASIA: ICD-10-CM

## 2024-12-17 DIAGNOSIS — N13.8 BPH WITH OBSTRUCTION/LOWER URINARY TRACT SYMPTOMS: ICD-10-CM

## 2024-12-17 DIAGNOSIS — R33.8 URINARY RETENTION DUE TO BENIGN PROSTATIC HYPERPLASIA: ICD-10-CM

## 2024-12-17 DIAGNOSIS — C61 PROSTATE CANCER: Primary | ICD-10-CM

## 2024-12-17 NOTE — PROGRESS NOTES
Follow Up Office Visit      Patient Name: Vishal Dejesus  : 1950   MRN: [id number]     Chief Complaint:    Chief Complaint   Patient presents with    Pathology Discussion        Referring Provider: No ref. provider found    History of Present Illness: Vishal Dejesus is a 74 y.o. male who presents today for follow up of chronic urinary retention, severely elevated bladder volumes, high risk prostate cancer recently diagnosed on biopsy.  He establish care with me originally in , was found to have severe incomplete bladder emptying and elevated PSA.  MRI prostate was recommended but the patient did not undergo MRI and was subsequently lost to urologic follow-up.  Saw me recently on 2024, PSA is rising to 5.7.  PVR at the last office visit was higher than 685 cc.  He underwent an MRI prostate suggesting a PI-RADS 5 lesion at the left base of prostate extending to the left seminal vesicle potentially.  There was an indeterminate lesion also noted at the left femoral head which was followed up with an x-ray demonstrating no obvious findings.  Patient's MRI was reviewed suggesting a severely distended bladder with multiple diverticuli, he is managing it with Rapaflo and now has been performing intermittent catheterization a few times per day.      Patient's prostate volume is roughly 90+ grams, the very obvious source of his urinary retention.    States he gets significant urine volumes out with cath sometimes up to a liter in volume.  Patient underwent a rectal swab which demonstrated no resistant organisms, was pretreated with antibiotics and enema and proceeded with a prostate biopsy performed on 12/10/2024 in the office.  This was a 17 core biopsy.  He returns today with his wife to review pathology findings.    DIAGNOSIS:  A.   PROSTATE, NEEDLE CORE BIOPSY, RIGHT BASE:  Benign prostate tissue  No identified high grade prostatic intraepithelial neoplasia or invasive  malignancy  B.   PROSTATE,  NEEDLE CORE BIOPSY, RIGHT MID:  Prostatic adenocarcinoma, Marilyn score 3+3 = 6/10, grade group 1  Malignancy involves 1 of 3 cores for an estimated 10% of the submitted tissue  C.   PROSTATE, NEEDLE CORE BIOPSY, RIGHT APEX:  Invasive prostatic adenocarcinoma, Marilyn score 4+4 = 8/10, grade group 4  Perineural invasion is identified  Malignancy involves 1 of 2 cores for an estimated less than 5% of the  submitted tissue  D.   PROSTATE, NEEDLE CORE BIOPSY, LEFT BASE:  Invasive prostatic adenocarcinoma, Marilyn score 3+4 = 7/10, grade group 2  Malignancy involves multiple of multiple cores for an estimated 40% of  the submitted tissue  E.   PROSTATE, NEEDLE CORE BIOPSY, LEFT MID:  Invasive prostatic adenocarcinoma, Marilyn score 3+4 = 7/10, grade group 2  Malignancy involves 1 of multiple cores for an estimated less than 5% of  the submitted tissue  F.   PROSTATE, NEEDLE CORE BIOPSY, LEFT APEX:  Invasive prostatic adenocarcinoma, [place] score 3+4 = 7/10, grade group 2  Malignancy involves 2 of 2 cores for an estimated 40% of the submitted tissue     Patient is found to have high-volume grade group 2 prostate cancer throughout the left prostate with also grade group 4 (Marilyn 8), high risk prostate cancer at the right apex.  Perineural invasion is identified.    Since his biopsy, denies significant UTI concerns, no fevers, chills, cloudy urine etc.  No obvious hematuria or blood in stool.    Subjective      Review of System: Review of Systems   Genitourinary:  Positive for decreased urine volume and difficulty urinating.      I have reviewed the ROS documented by my clinical staff, I have updated appropriately and I agree. Adam Nguyen MD    I have reviewed and the following portions of the patient's history were updated as appropriate: past family history, past medical history, past social history, past surgical history and problem list.    Medications:   No current facility-administered medications for this  visit.    Current Outpatient Medications:     CARTIA  MG 24 hr capsule, Take 1 capsule by mouth Daily., Disp: , Rfl: 0    losartan (COZAAR) 50 MG tablet, Take 1 tablet by mouth Daily. for blood pressure, Disp: , Rfl:     metoprolol tartrate (LOPRESSOR) 50 MG tablet, Take 1 tablet by mouth Daily., Disp: , Rfl:     Silodosin (RAPAFLO PO), Take 8 mg by mouth Daily., Disp: , Rfl:     Xarelto 20 MG tablet, Take 1 tablet by mouth Daily., Disp: , Rfl:     oseltamivir (Tamiflu) 75 MG capsule, Take 1 capsule by mouth 2 (Two) Times a Day. (Patient not taking: Reported on 11/21/2024), Disp: 10 capsule, Rfl: 0    Facility-Administered Medications Ordered in Other Visits:     cefTRIAXone (ROCEPHIN) 2,000 mg in sodium chloride 0.9 % 100 mL MBP, 2,000 mg, Intravenous, Once, Denny Moon MD    sodium chloride 0.9 % bolus 2,000 mL, 2,000 mL, Intravenous, Once, Denny Moon MD    vancomycin 2750 mg/500 mL 0.9% NS IVPB ([facility]), 22 mg/kg, Intravenous, Once, Denny Moon MD    Allergies:   No Known Allergies      Objective     Physical Exam:   Vital Signs: There were no vitals filed for this visit.  There is no height or weight on file to calculate BMI.     Physical Exam  Constitutional:       Appearance: Normal appearance.   HENT:      Head: Normocephalic and atraumatic.      Nose: Nose normal.   Eyes:      Extraocular Movements: Extraocular movements intact.      Conjunctiva/sclera: Conjunctivae normal.      Pupils: Pupils are equal, round, and reactive to light.   Musculoskeletal:         General: Normal range of motion.      Cervical back: Normal range of motion and neck supple.   Skin:     General: Skin is warm and dry.      Findings: No lesion or rash.   Neurological:      General: No focal deficit present.      Mental Status: He is alert and oriented to person, place, and time. Mental status is at baseline.   Psychiatric:         Mood and Affect: Mood normal.         Behavior: Behavior normal.         Labs:    Brief Urine Lab Results       None                 Lab Results   Component Value Date    GLUCOSE 112 (H) 12/19/2024    CALCIUM 8.8 12/19/2024     12/19/2024    K 3.8 12/19/2024    CO2 20.0 (L) 12/19/2024     12/19/2024    BUN 12 12/19/2024    CREATININE 0.91 12/19/2024    EGFRIFAFRI 70 10/04/2024    EGFRIFNONA 82 10/29/2019    BCR 13.2 12/19/2024    ANIONGAP 14.0 12/19/2024       Lab Results   Component Value Date    WBC 4.22 12/19/2024    HGB 16.7 12/19/2024    HCT 48.8 12/19/2024    MCV 90.5 12/19/2024     (L) 12/19/2024       Images:   XR Femur 2 View Left    Result Date: 10/14/2024  1. No appreciable left femur or left thigh soft tissue mass is identified. Outside comparison MRI reported images are not available. Mild degenerative changes of the left hip. 3. Left knee replacement. Electronically Signed: Lashon Higuera MD  10/14/2024 1:36 PM EDT  Workstation ID: [id number]    CT ANGIOGRAM CORONARY    Result Date: 10/4/2024  1. Mild coronary calcification with an Agatston score = 111 using the AJ-130 method, which represents 32 percentile when matched for age, gender and ethnicity.  Vascular age is 71 years. 2. 47 mm aneurysm of the ascending aorta. 3. CAD-RADS 1: Management recommendations:  Consider non-atherosclerotic causes of chest pain. Consider referral for outpatient follow-up for preventative therapy and risk factor modification.   4.  Tiny ASD. 5.  Possible bronchiolitis. CRITICAL RESULT: No. COMMUNICATION: Per this written report. By electronically signing this report, I, the attending physician, attest that I have personally reviewed the images/data for the above examination(s) and agree with the final edited report. Drafted by Kris Chan MD on 10/4/2024 8:35 AM Final report signed by Yanelis Da Silva MD on 10/4/2024 10:15 AM      Measures:   Tobacco:   Vishal Dejesus  reports that he has never smoked. He has never used smokeless tobacco.       Assessment / Plan      Assessment/Plan:  "  74 y.o. male who presented today for follow up of high risk prostate cancer in the setting of chronic urinary retention likely secondary to BPH.       I had the pleasure of meeting with Vishal Dejesus in clinic today.  I reviewed his diagnosis of prostate cancer and how the [place] score is used in the risk stratification system together with PSA and clinical examination.  I discussed the Marilyn score as well as \"Grade Group Scoring\" in detail with respect to their role in tumor biology and behavior.      I then discussed the treatment options for a man with HIGH RISK disease as outlined by the NCCN in whom at least 10 years of life expectancy is anticipated:    1.  Surgery with pelvic lymph node dissection +/- adjuvant therapies  2.  External beam radiotherapy or brachytherapy       Radiation  I had a brief discussion with the patient's about some of the pros and cons to radiation, pros would include lower upfront quality of life changes and reduced initial side effects associated with urinary incontinence and erectile dysfunction, however both of these are possible long-term.  We also discussed possibilities of bladder and rectal irritation with radiation.  I also briefly mentioned the difficulty associated with post radiation prostatectomy if the patient were to develop a recurrence after definitive treatment with radiation.  This is in contrast to upfront surgery with the ability to perform salvage or adjuvant radiation if the patient experiences biochemical recurrence after definitive therapy with surgery.  Patient expressed understanding.    Discussed he will need androgen deprivation therapy alongside his radiation for at least 12 to 18 months per guidelines.    As I do with all patients I  with newly diagnosed prostate cancer, I encouraged him to seek out further discussion with Radiation-Oncology for a more detailed discussion, and offered him a referral if he would like.     Surgery  I reviewed the " role of surgery and the relevant surgical anatomy and the role of the robotic platform.  I explained that surgery for prostate cancer exists on a continuum of quality of life outcomes and cancer control.  We reviewed that a maximal cancer surgery is also associated with maximal impacts on quality of life (erectile dysfunction and incontinence).  I explained that the approach utilized for the surgery is driven by his goals of cancer care and his particular pathology and staging.      I explained that incontinence after robotic or open prostatectomy is typically an inevitability, but usually improves within weeks after surgery, the majority of men are dry just a few months after surgery, but some may struggle with incontinence out to a year, and some men may still be dealing with incontinence after 12 months.  Greater than 90% of men will achieve continence at 1 year.  Failure to achieve continence after 12 months is considered abnormal, and patients are typically offered surgical treatment options to correct this if needed.    I reviewed that the rate of potency is dependent on baseline functional status and time.  Specifically if a bilateral nerve sparing procedure were performed in the setting of perfect erections pre-operatively, that we would expect roughly 85% of men to regain potency within 2 years of surgery; most of them beginning their recovery around 6-9 months from surgery.  Of these 85% of men, roughly 50% will require medications to support their erections long-term and that all men will initially require some degree of medication support.  Of the 15% of men who do not regain function, this will require either a vacuum erection device or injection therapy.  The trade-off is that with more preserved tissue there is a greater probability of positive surgical margins.  The presence of a margin would therefore be associated with an increased risk of recurrent disease and need for adjuvant and salvage  "therapies.       On the alternative end of the surgical spectrum we could proceed with with \"wide\" dissection bilaterally, which would maximize the tissues removed.  This would result in longer time to continence and potency only achievable with injection or vacuum erection devices given removal of the nerves which are necessary for natural erections.  The rate of continence is the same 95% at 1 year, but it takes more time to achieve that result, with most men achieving continence around 6 months from surgery.  While this approach does not guarantee negative margins and a lack of recurrence, the rate of margins is lessened with the greater tissue removed.    We discussed the role of pelvic lymphadenectomy or removal of the pelvic lymph node tissues to assist with staging the patient and ruling out local prostate cancer spread, we also discussed that lymph node removal may offer cancer specific survival benefit in some men as well, by removing a potential source of microscopic cancer cells or potential sites of spread.  I described to the patient that I always remove lymph nodes during prostatectomy, and then I believe it gives us the best and most robust information in terms of his overall cancer staging, which may inform need for further treatment post prostatectomy if advanced disease or locally metastatic disease is found.    I also reviewed the specific procedural risks, which include, but are not limited to infection, bleeding, need for blood transfusion, and injury to surrounding structures including the rectum, small bowel, bladder, ureters, blood vessels, nerves specifically the obturator nerve.  I reviewed the general procedural risks of wound healing complications, wound infections, conversion to open procedure as well as termination of procedure, or need for additional procedures. We have discussed the risk of long term neuropraxia, air emboli, MI, DVT, PE, stroke, and death.      Survivorship   I then " discussed survivorship care which consists of monitoring for recurrence and management of treatment related side effects.    I reviewed how pathology dictates follow-up and risk of recurrence.  I explained that men with treated prostate cancer (surgery or radiation) are at risk of recurrence during follow-up and that historically up around 30% of men will require adjuvant treatments; often based on the post-surgical pathology.  I additionally reviewed how monitoring is performed to maximize the benefit of adjuvant therapies should they be required.      I provided the patient a copy of my prostate cancer packet and encouraged him to review it in detail as it reinforces and expands on the risks and benefits of each approach to managing prostatectomy.    Discussion summary    Extensive, prolonged discussion with the patient and his wife who presented with him today.  We discussed his very urologic issues including his significant BPH causing chronic urinary retention, signs of bladder trabeculation and likely chronic bladder dysfunction.  Currently catheterizing and getting 700 to 1000 mL urine out with each cath.  This indicates that he is not catheterizing frequently enough.  I recommend he catheterize at least 3-4 times daily to ensure bladder emptying.  Catheter supplies will be sent through a supply company to his home, I recommend a 16 [place] coudé catheter even significant BPH and acute angulation through the prostatic urethra.    Patient has high risk disease in addition to high-volume grade group 2 disease with likely extraprostatic extension and seminal vesicle invasion on the left in the setting of a fairly low PSA.  We discussed the importance of obtaining a PSMA PET scan for complete staging.  We discussed at length the various treatment options including radiation plus ADT or radical prostatectomy with potential need for adjuvant or salvage therapies despite surgery to include ADT and radiation  postop.    The patient is very opposed to considering a radical prostatectomy and we will need to refer him to radiation oncologist.  We discussed the potential benefit of a robotic prostatectomy to remove his prostate to relieve his bladder outlet obstruction, otherwise he will likely need to continue CIC indefinitely.  He is more inclined to continue indefinite intermittent catheterization rather than proceed with surgery after discussion.  He would like to defer radiation therapy referral until after PSMA PET scan has been completed.  I will see him back to review his PET scan for next steps.      Diagnoses and all orders for this visit:    1. Prostate cancer (Primary)  -     NM PET/CT Skull Base to Mid Thigh; Future    2. BPH with obstruction/lower urinary tract symptoms    3. Urinary retention due to benign prostatic hyperplasia     - 16 Fr coude CIC 3-4x daily indefinitely      Follow Up:   Return in about 2 weeks (around 12/31/2024) for Follow Up after Imaging.    I spent approximately 45 minutes providing clinical care for this patient; including review of patient's chart and provider documentation, face to face time spent with patient in examination room (obtaining history, performing physical exam, discussing diagnosis and management options), placing orders, and completing patient documentation.     Adam Nguyen MD  [facility]

## 2024-12-19 ENCOUNTER — TELEPHONE (OUTPATIENT)
Dept: UROLOGY | Facility: CLINIC | Age: 74
End: 2024-12-19
Payer: MEDICARE

## 2024-12-19 ENCOUNTER — APPOINTMENT (OUTPATIENT)
Dept: CT IMAGING | Facility: HOSPITAL | Age: 74
End: 2024-12-19
Payer: MEDICARE

## 2024-12-19 ENCOUNTER — APPOINTMENT (OUTPATIENT)
Dept: GENERAL RADIOLOGY | Facility: HOSPITAL | Age: 74
End: 2024-12-19
Payer: MEDICARE

## 2024-12-19 ENCOUNTER — HOSPITAL ENCOUNTER (INPATIENT)
Facility: HOSPITAL | Age: 74
LOS: 1 days | Discharge: HOME | End: 2024-12-21
Attending: EMERGENCY MEDICINE | Admitting: INTERNAL MEDICINE
Payer: MEDICARE

## 2024-12-19 DIAGNOSIS — K86.2 PANCREATIC CYST: ICD-10-CM

## 2024-12-19 DIAGNOSIS — R31.9 URINARY TRACT INFECTION WITH HEMATURIA, SITE UNSPECIFIED: Primary | ICD-10-CM

## 2024-12-19 DIAGNOSIS — N39.0 URINARY TRACT INFECTION WITH HEMATURIA, SITE UNSPECIFIED: Primary | ICD-10-CM

## 2024-12-19 DIAGNOSIS — Z79.01 CHRONIC ANTICOAGULATION: ICD-10-CM

## 2024-12-19 DIAGNOSIS — E87.20 LACTIC ACIDOSIS: ICD-10-CM

## 2024-12-19 DIAGNOSIS — Z88.0 PENICILLIN ALLERGY: ICD-10-CM

## 2024-12-19 PROBLEM — A41.9 SEPSIS SECONDARY TO UTI: Status: ACTIVE | Noted: 2024-12-19

## 2024-12-19 LAB
ALBUMIN SERPL-MCNC: 3.8 G/DL (ref 3.5–5.2)
ALBUMIN/GLOB SERPL: 1.7 G/DL
ALP SERPL-CCNC: 67 U/L (ref 39–117)
ALT SERPL W P-5'-P-CCNC: 37 U/L (ref 1–41)
ANION GAP SERPL CALCULATED.3IONS-SCNC: 14 MMOL/L (ref 5–15)
AST SERPL-CCNC: 29 U/L (ref 1–40)
BACTERIA UR QL AUTO: ABNORMAL /HPF
BASOPHILS # BLD AUTO: 0.01 10*3/MM3 (ref 0–0.2)
BASOPHILS NFR BLD AUTO: 0.2 % (ref 0–1.5)
BILIRUB SERPL-MCNC: 1.2 MG/DL (ref 0–1.2)
BILIRUB UR QL STRIP: NEGATIVE
BUN SERPL-MCNC: 12 MG/DL (ref 8–23)
BUN/CREAT SERPL: 13.2 (ref 7–25)
CALCIUM SPEC-SCNC: 8.8 MG/DL (ref 8.6–10.5)
CHLORIDE SERPL-SCNC: 102 MMOL/L (ref 98–107)
CLARITY UR: ABNORMAL
CO2 SERPL-SCNC: 20 MMOL/L (ref 22–29)
COLOR UR: ABNORMAL
CREAT SERPL-MCNC: 0.91 MG/DL (ref 0.76–1.27)
D-LACTATE SERPL-SCNC: 3 MMOL/L (ref 0.5–2)
D-LACTATE SERPL-SCNC: 3.1 MMOL/L (ref 0.5–2)
D-LACTATE SERPL-SCNC: 3.2 MMOL/L (ref 0.5–2)
D-LACTATE SERPL-SCNC: 4.1 MMOL/L (ref 0.5–2)
DEPRECATED RDW RBC AUTO: 43.8 FL (ref 37–54)
EGFRCR SERPLBLD CKD-EPI 2021: 88.4 ML/MIN/1.73
EOSINOPHIL # BLD AUTO: 0 10*3/MM3 (ref 0–0.4)
EOSINOPHIL NFR BLD AUTO: 0 % (ref 0.3–6.2)
ERYTHROCYTE [DISTWIDTH] IN BLOOD BY AUTOMATED COUNT: 13.2 % (ref 12.3–15.4)
FLUAV RNA RESP QL NAA+PROBE: NOT DETECTED
FLUBV RNA RESP QL NAA+PROBE: NOT DETECTED
GLOBULIN UR ELPH-MCNC: 2.3 GM/DL
GLUCOSE SERPL-MCNC: 112 MG/DL (ref 65–99)
GLUCOSE UR STRIP-MCNC: NEGATIVE MG/DL
HCT VFR BLD AUTO: 48.8 % (ref 37.5–51)
HGB BLD-MCNC: 16.7 G/DL (ref 13–17.7)
HGB UR QL STRIP.AUTO: ABNORMAL
HYALINE CASTS UR QL AUTO: ABNORMAL /LPF
IMM GRANULOCYTES # BLD AUTO: 0.02 10*3/MM3 (ref 0–0.05)
IMM GRANULOCYTES NFR BLD AUTO: 0.5 % (ref 0–0.5)
KETONES UR QL STRIP: ABNORMAL
LEUKOCYTE ESTERASE UR QL STRIP.AUTO: ABNORMAL
LYMPHOCYTES # BLD AUTO: 0.12 10*3/MM3 (ref 0.7–3.1)
LYMPHOCYTES NFR BLD AUTO: 2.8 % (ref 19.6–45.3)
MCH RBC QN AUTO: 31 PG (ref 26.6–33)
MCHC RBC AUTO-ENTMCNC: 34.2 G/DL (ref 31.5–35.7)
MCV RBC AUTO: 90.5 FL (ref 79–97)
MONOCYTES # BLD AUTO: 0.02 10*3/MM3 (ref 0.1–0.9)
MONOCYTES NFR BLD AUTO: 0.5 % (ref 5–12)
NEUTROPHILS NFR BLD AUTO: 4.05 10*3/MM3 (ref 1.7–7)
NEUTROPHILS NFR BLD AUTO: 96 % (ref 42.7–76)
NITRITE UR QL STRIP: POSITIVE
NRBC BLD AUTO-RTO: 0 /100 WBC (ref 0–0.2)
PH UR STRIP.AUTO: 5.5 [PH] (ref 5–8)
PLATELET # BLD AUTO: 112 10*3/MM3 (ref 140–450)
PMV BLD AUTO: 9.8 FL (ref 6–12)
POTASSIUM SERPL-SCNC: 3.8 MMOL/L (ref 3.5–5.2)
PROCALCITONIN SERPL-MCNC: 1.08 NG/ML (ref 0–0.25)
PROT SERPL-MCNC: 6.1 G/DL (ref 6–8.5)
PROT UR QL STRIP: ABNORMAL
QT INTERVAL: 316 MS
QTC INTERVAL: 456 MS
RBC # BLD AUTO: 5.39 10*6/MM3 (ref 4.14–5.8)
RBC # UR STRIP: ABNORMAL /HPF
REF LAB TEST METHOD: ABNORMAL
RSV RNA RESP QL NAA+PROBE: NOT DETECTED
SARS-COV-2 RNA RESP QL NAA+PROBE: NOT DETECTED
SODIUM SERPL-SCNC: 136 MMOL/L (ref 136–145)
SP GR UR STRIP: 1.02 (ref 1–1.03)
SQUAMOUS #/AREA URNS HPF: ABNORMAL /HPF
UROBILINOGEN UR QL STRIP: ABNORMAL
WBC # UR STRIP: ABNORMAL /HPF
WBC NRBC COR # BLD AUTO: 4.22 10*3/MM3 (ref 3.4–10.8)

## 2024-12-19 PROCEDURE — 99222 1ST HOSP IP/OBS MODERATE 55: CPT | Performed by: STUDENT IN AN ORGANIZED HEALTH CARE EDUCATION/TRAINING PROGRAM

## 2024-12-19 PROCEDURE — 25810000003 LACTATED RINGERS PER 1000 ML: Performed by: STUDENT IN AN ORGANIZED HEALTH CARE EDUCATION/TRAINING PROGRAM

## 2024-12-19 PROCEDURE — 85025 COMPLETE CBC W/AUTO DIFF WBC: CPT | Performed by: EMERGENCY MEDICINE

## 2024-12-19 PROCEDURE — 25010000002 DEXAMETHASONE PER 1 MG: Performed by: EMERGENCY MEDICINE

## 2024-12-19 PROCEDURE — 74177 CT ABD & PELVIS W/CONTRAST: CPT

## 2024-12-19 PROCEDURE — 87040 BLOOD CULTURE FOR BACTERIA: CPT | Performed by: EMERGENCY MEDICINE

## 2024-12-19 PROCEDURE — 25010000002 DIPHENHYDRAMINE PER 50 MG: Performed by: EMERGENCY MEDICINE

## 2024-12-19 PROCEDURE — 25010000002 CEFEPIME PER 500 MG: Performed by: STUDENT IN AN ORGANIZED HEALTH CARE EDUCATION/TRAINING PROGRAM

## 2024-12-19 PROCEDURE — 93005 ELECTROCARDIOGRAM TRACING: CPT | Performed by: EMERGENCY MEDICINE

## 2024-12-19 PROCEDURE — 36415 COLL VENOUS BLD VENIPUNCTURE: CPT

## 2024-12-19 PROCEDURE — 83605 ASSAY OF LACTIC ACID: CPT | Performed by: EMERGENCY MEDICINE

## 2024-12-19 PROCEDURE — 25010000002 PIPERACILLIN SOD-TAZOBACTAM PER 1 G: Performed by: EMERGENCY MEDICINE

## 2024-12-19 PROCEDURE — G0378 HOSPITAL OBSERVATION PER HR: HCPCS

## 2024-12-19 PROCEDURE — 81001 URINALYSIS AUTO W/SCOPE: CPT | Performed by: STUDENT IN AN ORGANIZED HEALTH CARE EDUCATION/TRAINING PROGRAM

## 2024-12-19 PROCEDURE — 25810000003 SODIUM CHLORIDE 0.9 % SOLUTION: Performed by: EMERGENCY MEDICINE

## 2024-12-19 PROCEDURE — 25010000002 CEFTRIAXONE PER 250 MG: Performed by: EMERGENCY MEDICINE

## 2024-12-19 PROCEDURE — 71045 X-RAY EXAM CHEST 1 VIEW: CPT

## 2024-12-19 PROCEDURE — 87086 URINE CULTURE/COLONY COUNT: CPT | Performed by: STUDENT IN AN ORGANIZED HEALTH CARE EDUCATION/TRAINING PROGRAM

## 2024-12-19 PROCEDURE — 25510000001 IOPAMIDOL 61 % SOLUTION: Performed by: EMERGENCY MEDICINE

## 2024-12-19 PROCEDURE — 87637 SARSCOV2&INF A&B&RSV AMP PRB: CPT | Performed by: EMERGENCY MEDICINE

## 2024-12-19 PROCEDURE — 84145 PROCALCITONIN (PCT): CPT | Performed by: EMERGENCY MEDICINE

## 2024-12-19 PROCEDURE — 80053 COMPREHEN METABOLIC PANEL: CPT | Performed by: EMERGENCY MEDICINE

## 2024-12-19 PROCEDURE — 99285 EMERGENCY DEPT VISIT HI MDM: CPT

## 2024-12-19 PROCEDURE — 25010000002 VANCOMYCIN 10 G RECONSTITUTED SOLUTION: Performed by: EMERGENCY MEDICINE

## 2024-12-19 RX ORDER — TAMSULOSIN HYDROCHLORIDE 0.4 MG/1
0.4 CAPSULE ORAL NIGHTLY
Status: DISCONTINUED | OUTPATIENT
Start: 2024-12-19 | End: 2024-12-21 | Stop reason: HOSPADM

## 2024-12-19 RX ORDER — ASPIRIN 81 MG/1
243 TABLET ORAL DAILY
Status: DISCONTINUED | OUTPATIENT
Start: 2024-12-20 | End: 2024-12-20

## 2024-12-19 RX ORDER — ONDANSETRON 2 MG/ML
4 INJECTION INTRAMUSCULAR; INTRAVENOUS EVERY 6 HOURS PRN
Status: DISCONTINUED | OUTPATIENT
Start: 2024-12-19 | End: 2024-12-21 | Stop reason: HOSPADM

## 2024-12-19 RX ORDER — DIPHENHYDRAMINE HYDROCHLORIDE 50 MG/ML
25 INJECTION INTRAMUSCULAR; INTRAVENOUS ONCE
Status: COMPLETED | OUTPATIENT
Start: 2024-12-19 | End: 2024-12-19

## 2024-12-19 RX ORDER — ACETAMINOPHEN 500 MG
500 TABLET ORAL EVERY 6 HOURS PRN
Status: DISCONTINUED | OUTPATIENT
Start: 2024-12-19 | End: 2024-12-20

## 2024-12-19 RX ORDER — ACETAMINOPHEN 500 MG
500 TABLET ORAL EVERY 6 HOURS PRN
COMMUNITY

## 2024-12-19 RX ORDER — ASPIRIN 81 MG/1
243 TABLET ORAL DAILY
Status: ON HOLD | COMMUNITY
End: 2024-12-19

## 2024-12-19 RX ORDER — POLYETHYLENE GLYCOL 3350 17 G/17G
17 POWDER, FOR SOLUTION ORAL DAILY PRN
Status: DISCONTINUED | OUTPATIENT
Start: 2024-12-19 | End: 2024-12-21 | Stop reason: HOSPADM

## 2024-12-19 RX ORDER — ACETAMINOPHEN 500 MG
1000 TABLET ORAL 3 TIMES DAILY
Status: DISCONTINUED | OUTPATIENT
Start: 2024-12-19 | End: 2024-12-20

## 2024-12-19 RX ORDER — SODIUM CHLORIDE, SODIUM LACTATE, POTASSIUM CHLORIDE, CALCIUM CHLORIDE 600; 310; 30; 20 MG/100ML; MG/100ML; MG/100ML; MG/100ML
100 INJECTION, SOLUTION INTRAVENOUS CONTINUOUS
Status: DISCONTINUED | OUTPATIENT
Start: 2024-12-19 | End: 2024-12-20

## 2024-12-19 RX ORDER — FAMOTIDINE 20 MG/1
40 TABLET, FILM COATED ORAL DAILY
Status: DISCONTINUED | OUTPATIENT
Start: 2024-12-19 | End: 2024-12-21 | Stop reason: HOSPADM

## 2024-12-19 RX ORDER — METOPROLOL SUCCINATE 25 MG/1
25 TABLET, EXTENDED RELEASE ORAL DAILY
COMMUNITY

## 2024-12-19 RX ORDER — AMOXICILLIN 250 MG
2 CAPSULE ORAL 2 TIMES DAILY PRN
Status: DISCONTINUED | OUTPATIENT
Start: 2024-12-19 | End: 2024-12-21 | Stop reason: HOSPADM

## 2024-12-19 RX ORDER — NITROGLYCERIN 0.4 MG/1
0.4 TABLET SUBLINGUAL
Status: DISCONTINUED | OUTPATIENT
Start: 2024-12-19 | End: 2024-12-21 | Stop reason: HOSPADM

## 2024-12-19 RX ORDER — FAMOTIDINE 10 MG/ML
20 INJECTION, SOLUTION INTRAVENOUS ONCE
Status: COMPLETED | OUTPATIENT
Start: 2024-12-19 | End: 2024-12-19

## 2024-12-19 RX ORDER — SODIUM CHLORIDE 0.9 % (FLUSH) 0.9 %
10 SYRINGE (ML) INJECTION AS NEEDED
Status: DISCONTINUED | OUTPATIENT
Start: 2024-12-19 | End: 2024-12-21 | Stop reason: HOSPADM

## 2024-12-19 RX ORDER — IOPAMIDOL 612 MG/ML
86 INJECTION, SOLUTION INTRAVASCULAR
Status: COMPLETED | OUTPATIENT
Start: 2024-12-19 | End: 2024-12-19

## 2024-12-19 RX ORDER — SODIUM CHLORIDE 0.9 % (FLUSH) 0.9 %
10 SYRINGE (ML) INJECTION EVERY 12 HOURS SCHEDULED
Status: DISCONTINUED | OUTPATIENT
Start: 2024-12-19 | End: 2024-12-21 | Stop reason: HOSPADM

## 2024-12-19 RX ORDER — DEXAMETHASONE SODIUM PHOSPHATE 10 MG/ML
10 INJECTION INTRAMUSCULAR; INTRAVENOUS ONCE
Status: COMPLETED | OUTPATIENT
Start: 2024-12-19 | End: 2024-12-19

## 2024-12-19 RX ORDER — SODIUM CHLORIDE 9 MG/ML
40 INJECTION, SOLUTION INTRAVENOUS AS NEEDED
Status: DISCONTINUED | OUTPATIENT
Start: 2024-12-19 | End: 2024-12-21 | Stop reason: HOSPADM

## 2024-12-19 RX ORDER — DILTIAZEM HYDROCHLORIDE 180 MG/1
180 CAPSULE, COATED, EXTENDED RELEASE ORAL DAILY
Status: DISCONTINUED | OUTPATIENT
Start: 2024-12-19 | End: 2024-12-21 | Stop reason: HOSPADM

## 2024-12-19 RX ORDER — SILODOSIN 8 MG/1
8 CAPSULE ORAL DAILY
Status: DISCONTINUED | OUTPATIENT
Start: 2024-12-19 | End: 2024-12-19

## 2024-12-19 RX ORDER — BISACODYL 5 MG/1
5 TABLET, DELAYED RELEASE ORAL DAILY PRN
Status: DISCONTINUED | OUTPATIENT
Start: 2024-12-19 | End: 2024-12-21 | Stop reason: HOSPADM

## 2024-12-19 RX ORDER — BISACODYL 10 MG
10 SUPPOSITORY, RECTAL RECTAL DAILY PRN
Status: DISCONTINUED | OUTPATIENT
Start: 2024-12-19 | End: 2024-12-21 | Stop reason: HOSPADM

## 2024-12-19 RX ADMIN — Medication 10 MG: at 22:06

## 2024-12-19 RX ADMIN — SODIUM CHLORIDE 2000 ML: 9 INJECTION, SOLUTION INTRAVENOUS at 12:49

## 2024-12-19 RX ADMIN — CEFEPIME 2000 MG: 2 INJECTION, POWDER, FOR SOLUTION INTRAVENOUS at 16:41

## 2024-12-19 RX ADMIN — IOPAMIDOL 85 ML: 612 INJECTION, SOLUTION INTRAVENOUS at 11:48

## 2024-12-19 RX ADMIN — DEXAMETHASONE SODIUM PHOSPHATE 10 MG: 10 INJECTION INTRAMUSCULAR; INTRAVENOUS at 12:36

## 2024-12-19 RX ADMIN — CEFEPIME 2000 MG: 2 INJECTION, POWDER, FOR SOLUTION INTRAVENOUS at 21:02

## 2024-12-19 RX ADMIN — SODIUM CHLORIDE 2000 MG: 900 INJECTION INTRAVENOUS at 12:50

## 2024-12-19 RX ADMIN — ACETAMINOPHEN 1000 MG: 500 TABLET, FILM COATED ORAL at 16:42

## 2024-12-19 RX ADMIN — VANCOMYCIN HYDROCHLORIDE 2750 MG: 10 INJECTION, POWDER, LYOPHILIZED, FOR SOLUTION INTRAVENOUS at 13:27

## 2024-12-19 RX ADMIN — Medication 10 ML: at 21:03

## 2024-12-19 RX ADMIN — RIVAROXABAN 20 MG: 20 TABLET, FILM COATED ORAL at 16:42

## 2024-12-19 RX ADMIN — TAMSULOSIN HYDROCHLORIDE 0.4 MG: 0.4 CAPSULE ORAL at 21:02

## 2024-12-19 RX ADMIN — DIPHENHYDRAMINE HYDROCHLORIDE 25 MG: 50 INJECTION INTRAMUSCULAR; INTRAVENOUS at 12:33

## 2024-12-19 RX ADMIN — PIPERACILLIN SODIUM AND TAZOBACTAM SODIUM 4.5 G: 4; .5 INJECTION, POWDER, LYOPHILIZED, FOR SOLUTION INTRAVENOUS at 12:11

## 2024-12-19 RX ADMIN — ACETAMINOPHEN 1000 MG: 500 TABLET, FILM COATED ORAL at 21:02

## 2024-12-19 RX ADMIN — FAMOTIDINE 40 MG: 20 TABLET, FILM COATED ORAL at 16:41

## 2024-12-19 RX ADMIN — SODIUM CHLORIDE 1000 ML: 9 INJECTION, SOLUTION INTRAVENOUS at 12:11

## 2024-12-19 RX ADMIN — FAMOTIDINE 20 MG: 10 INJECTION, SOLUTION INTRAVENOUS at 12:34

## 2024-12-19 RX ADMIN — SODIUM CHLORIDE, SODIUM LACTATE, POTASSIUM CHLORIDE, CALCIUM CHLORIDE 100 ML/HR: 20; 30; 600; 310 INJECTION, SOLUTION INTRAVENOUS at 15:54

## 2024-12-19 NOTE — Clinical Note
Level of Care: Telemetry [5]   Diagnosis: Sepsis secondary to UTI [075788]   Admitting Physician: ISABELLA MÉNDEZ [[id number]]   Attending Physician: ISABELLA MÉNDEZ [[id number]]

## 2024-12-19 NOTE — PLAN OF CARE
Problem: Adult Inpatient Plan of Care  Goal: Readiness for Transition of Care  Intervention: Mutually Develop Transition Plan  Description: Identify available resources for support (e.g., family, friends, community).  Identify and address barriers to ongoing treatment and home management (e.g., environmental, financial).  Provide opportunities to practice self-management skills.  Assess and monitor emotional readiness for transition.  Establish or reconnect linkage with outpatient providers or community-based services.  Recent Flowsheet Documentation  Taken 12/19/2024 1612 by Silvia Owen RN  Equipment Currently Used at Home: none  Taken 12/19/2024 1433 by Silvia Owen RN  Transportation Anticipated: family or friend will provide  Patient/Family Anticipated Services at Transition: none  Patient/Family Anticipates Transition to: home     Problem: UTI (Urinary Tract Infection)  Goal: Improved Infection Symptoms  Intervention: Prevent Infection Progression  Description: Obtain cultures prior to initiating antimicrobial therapy, when possible. Do not delay for laboratory results in the presence of high suspicion or clinical indicators.  Administer ordered antimicrobial therapy promptly; reassess need regularly.  Monitor laboratory value, diagnostic test and clinical status trends, including urine characteristic for signs of infection progression.  Provide pharmacologic comfort measures, such as an analgesic, antispasmodic or antiemetic agent.  Administer intravenous fluids or promote adequate oral fluid intake to increase or maintain urine production.  Promote consistent skin care, daily hygiene and perineal cleansing; avoid harsh soaps, encourage use of emollients and change incontinence garments promptly.  Identify early signs of sepsis, such as increased heart rate and decreased blood pressure, as well as changes in mental state, respiratory pattern or peripheral perfusion.  Prepare for rapid sepsis  management, including lactate level, intravenous access, fluid administration and oxygen therapy.  Anticipate and prepare for advanced diagnostic procedures based on risk and presentation (e.g., chest x-ray, renal sonography, abdominal or kidney radiographs).  Flowsheets (Taken 12/19/2024 1834)  Infection Management: cultures obtained and sent to lab  Fever Reduction/Comfort Measures: lightweight bedding  Intervention: Facilitate Optimal Urinary Elimination  Description: Monitor bowel and voiding pattern; promote good elimination habits (e.g., quick response to urge, frequent and complete emptying of bladder with voiding).  Provide incontinence products, such as moisture wicking incontinence pads and incontinence underwear.  Assess bladder volume with ultrasound prior to intermittent catheterization or to determine residual volume after catheterization or void; avoid overdistension of bladder.  Seek alternative to indwelling catheter, such as intermittent catheterization.  If indwelling catheter required, insert the smallest diameter catheter possible using aseptic technique; utilize a securement device.  Maintain closed collection system and unobstructed urine flow; position bag below bladder level and avoid tubing kinks.  Empty collection bag regularly, such as when 2/3 full and before transport.  Review necessity for indwelling catheter daily; advocate for prompt removal.  Flowsheets (Taken 12/19/2024 1834)  Urinary Elimination Promotion: toileting offered   Goal Outcome Evaluation:

## 2024-12-19 NOTE — CASE MANAGEMENT/SOCIAL WORK
Discharge Planning Assessment  [facility]     Patient Name: Vishal Dejesus  MRN: [id number]  Today's Date: 12/19/2024    Admit Date: 12/19/2024    Plan: IDP   Discharge Needs Assessment       Row Name 12/19/24 1300       Living Environment    People in Home spouse    Current Living Arrangements home    Primary Care Provided by self    Family Caregiver if Needed spouse    Quality of Family Relationships helpful;involved    Able to Return to Prior Arrangements yes       Transition Planning    Patient/Family Anticipates Transition to home    Transportation Anticipated family or friend will provide       Discharge Needs Assessment    Readmission Within the Last 30 Days no previous admission in last 30 days    Equipment Currently Used at Home none    Concerns to be Addressed denies needs/concerns at this time                   Discharge Plan       Row Name 12/19/24 1303       Plan    Plan IDP    Plan Comments MSW met with Pt and pt’s wife at bedside to complete IDP. Pt lives with wife in [facility]. Pt’s wife confirmed demographics and reports PCP is Orlando Curtis. Pt has Medicare and [employer] insurance coverage. Pt independent with ADLs; Pt reports no DME, HH, or home O2.Pt’s preferred pharmacy is [facility]. Pt’s wife able to transport when medically ready. Pt’s wife denied needs or concerns. CM will continue to follow.                  Continued Care and Services - Admitted Since 12/19/2024    No active coordination exists for this encounter.          Demographic Summary       Row Name 12/19/24 1300       General Information    Arrived From home    Referral Source admission list;emergency department    Reason for Consult discharge planning    Preferred Language English                   Functional Status       Row Name 12/19/24 1300       Functional Status    Usual Activity Tolerance good    Current Activity Tolerance good       Functional Status, IADL    Medications independent    Meal Preparation independent     Housekeeping independent    Laundry independent    Shopping independent                               NADIA Cabrales

## 2024-12-19 NOTE — TELEPHONE ENCOUNTER
PT's wife called, she states that PT is having chills, body aches, and pain. He is 9 days out from prostate biopsy. Confirmed with Dr. Nguyen that PT needs to proceed to ER for evaluation of infection.

## 2024-12-19 NOTE — CONSULTS
[facility] Consult Note     Date of Consult: 2024     Patient Name: Vishal Dejesus  MRN: [id number]   : 1950     Referring Provider: * No referring provider recorded for this case *    Care Team: Patient Care Team:  Orlando Curtis MD as PCP - General (Family Medicine)     Reason for Hospitalization: Concern for post-prostate biopsy infection    History of Present Illness: Was contacted for hospital consultation on Vishal Dejesus a 74 y.o. male who presents to the hospital for concern for post-prostate biopsy infection.      Patient has hx of chronic urinary retention with elevated bladder volumes requiring CIC and elevated PSA s/p prostate biopsy 12/10/24. Path revealed high risk prostate cancer.     He presented to ED with complaints of sever chills/rigors and weakness. He also felt slightly feverish. He has some hematuria. He is cath'ing without issue. He denies suprapubic pain.     Subjective      Pertinent items are noted in HPI.     Past Medical History:   Past Medical History:   Diagnosis Date    A-fib     Arthritis     Coronary artery disease     Elevated PSA 24    See MRI    Hyperlipidemia     Hypertension     Obesity        Past Surgical History:   Past Surgical History:   Procedure Laterality Date    APPENDECTOMY  1986    CARDIAC ABLATION      KNEE SURGERY      VASECTOMY      No       Family History:   Family History   Problem Relation Age of Onset    Hypertension Mother     Stroke Mother     Hypertension Sister     Diabetes Sister        Social History:   Social History     Socioeconomic History    Marital status:    Tobacco Use    Smoking status: Never    Smokeless tobacco: Never   Vaping Use    Vaping status: Never Used   Substance and Sexual Activity    Alcohol use: Yes     Alcohol/week: 10.0 standard drinks of alcohol     Types: 10 Drinks containing 0.5 oz of alcohol per week     Comment: Sure is good    Drug use: No    Sexual activity: Not  Currently     Partners: Female     Birth control/protection: Vasectomy       Medications:     Current Facility-Administered Medications:     acetaminophen (TYLENOL) tablet 1,000 mg, 1,000 mg, Oral, TID, Gagandeep Kelly,     acetaminophen (TYLENOL) tablet 500 mg, 500 mg, Oral, Q6H PRN, Gagandeep Kelly, DO    [START ON 12/20/2024] aspirin EC tablet 243 mg, 243 mg, Oral, Daily, Gagandeep Kelly,     sennosides-docusate (PERICOLACE) 8.6-50 MG per tablet 2 tablet, 2 tablet, Oral, BID PRN **AND** polyethylene glycol (MIRALAX) packet 17 g, 17 g, Oral, Daily PRN **AND** bisacodyl (DULCOLAX) EC tablet 5 mg, 5 mg, Oral, Daily PRN **AND** bisacodyl (DULCOLAX) suppository 10 mg, 10 mg, Rectal, Daily PRN, Gagandeep Kelly,     Calcium Replacement - Follow Nurse / BPA Driven Protocol, , Not Applicable, PRN, Gagandeep Kelly,     cefepime 2000 mg IVPB in 100 mL NS (MBP), 2,000 mg, Intravenous, Once, Gagandeep Kelly,     cefepime 2000 mg IVPB in 100 mL NS (MBP), 2,000 mg, Intravenous, Q8H, Gagandeep Kelly DO    dilTIAZem CD (CARDIZEM CD) 24 hr capsule 180 mg, 180 mg, Oral, Daily, Gagandeep Kelly,     famotidine (PEPCID) tablet 40 mg, 40 mg, Oral, Daily, Gagandeep Kelly,     lactated ringers infusion, 100 mL/hr, Intravenous, Continuous, Gagandeep Kelly,     Magnesium Standard Dose Replacement - Follow Nurse / BPA Driven Protocol, , Not Applicable, PRN, Gagandeep Kelly, DO    nitroglycerin (NITROSTAT) SL tablet 0.4 mg, 0.4 mg, Sublingual, Q5 Min PRN, Gagandeep Kelly, DO    ondansetron (ZOFRAN) injection 4 mg, 4 mg, Intravenous, Q6H PRN, Gagandeep Kelly,     Phosphorus Replacement - Follow Nurse / BPA Driven Protocol, , Not Applicable, PRN, Gagandeep Kelly,     Potassium Replacement - Follow Nurse / BPA Driven Protocol, , Not Applicable, PRN, Gagandeep Kelly, DO    rivaroxaban (XARELTO) tablet 20 mg, 20 mg, Oral, Daily With Breakfast, Gagandeep Kelly,     silodosin (RAPAFLO) capsule  8 mg, 8 mg, Oral, Daily, Gagandeep Kelly DO    sodium chloride 0.9 % flush 10 mL, 10 mL, Intravenous, Q12H, Gagandeep Kelly DO    sodium chloride 0.9 % flush 10 mL, 10 mL, Intravenous, PRN, Gagandeep Kelly,     sodium chloride 0.9 % infusion 40 mL, 40 mL, Intravenous, PRN, Gagandeep Kelly DO    vancomycin 2750 mg/500 mL 0.9% NS IVPB ([facility]), 22 mg/kg, Intravenous, Once, Gagandeep Kelly DO, 2,750 mg at 12/19/24 1327    Allergies:   No Known Allergies    Problem:     Sepsis secondary to UTI    Benign essential hypertension    Benign prostatic hyperplasia    Simple renal cyst    Pancreatic cyst       Review of Systems:   Review of Systems - History obtained from chart review and the patient  General ROS: positive for  - fever, chills, and rigors  Respiratory ROS: no cough, shortness of breath, or wheezing  Cardiovascular ROS: no chest pain or dyspnea on exertion  Gastrointestinal ROS: no abdominal pain, change in bowel habits, or black or bloody stools  Genito-Urinary ROS: positive for - hematuria and need for CIC  Musculoskeletal ROS: positive for - muscular weakness  Neurological ROS: no TIA or stroke symptoms      Objective     Physical Exam:  Vital Signs:   Temp:  [97.8 °F (36.6 °C)-100.4 °F (38 °C)] 97.8 °F (36.6 °C)  Heart Rate:  [101-127] 101  Resp:  [18-20] 18  BP: ()/(60-98) 102/63  124 kg (272 lb 12.8 oz)  Body mass index is 35.99 kg/m².     GEN: NAD  HEENT: NCAT, EOMI  PULM: No respiratory distress  CV: Appears well perfused  ABD: Soft, non-tender, non-distended  : No suprapubic tenderness  EXT: No obvious deformities  MSK: Normal ROM BL UE  NEURO: No focal deficits, AOx3  PSYCH: Appropriate mood and affect      No intake/output data recorded.    Labs  Lab Results   Component Value Date    GLUCOSE 112 (H) 12/19/2024    CALCIUM 8.8 12/19/2024     12/19/2024    K 3.8 12/19/2024    CO2 20.0 (L) 12/19/2024     12/19/2024    BUN 12 12/19/2024    CREATININE 0.91 12/19/2024     "EGFRIFAFRI 70 10/04/2024    EGFRIFNONA 82 10/29/2019    BCR 13.2 12/19/2024    ANIONGAP 14.0 12/19/2024       Lab Results   Component Value Date    WBC 4.22 12/19/2024    HGB 16.7 12/19/2024    HCT 48.8 12/19/2024    MCV 90.5 12/19/2024     (L) 12/19/2024       No results found for: \"URINECX\"    Brief Urine Lab Results       None            Radiographic Studies  CT Abdomen Pelvis With Contrast    Result Date: 12/19/2024  Impression: 1.Prostatomegaly. No prostatic abscess is identified. 2.Bladder wall thickening may be related to underdistention, increased trabeculation, or cystitis. Please correlate with urinalysis. 3. 2.6 cm hypodense structure seen about the pancreatic head, possibly an exophytic pancreatic cyst. Pancreatic protocol MRI may be useful to further characterize this finding. 4.Colonic diverticulosis. 5.Hepatic steatosis. 6.Additional findings as detailed above. Electronically Signed: Jeremy Mcneal MD  12/19/2024 12:01 PM EST  Workstation ID: [id number]    XR Chest 1 View    Result Date: 12/19/2024  Impression: No evidence of acute cardiopulmonary disease. Electronically Signed: Sid Parson MD  12/19/2024 10:16 AM EST  Workstation ID: [id number]     Results Review:   I reviewed the patient's new clinical results.     Imaging Results (Last 72 Hours)       Procedure Component Value Units Date/Time    CT Abdomen Pelvis With Contrast [[id number]] Collected: 12/19/24 1153     Updated: 12/19/24 1204    Narrative:      CT ABDOMEN PELVIS W CONTRAST    Date of Exam: 12/19/2024 11:44 AM EST    Indication: Postop day 9 prostate biopsy.  Currently septic.  Please evaluate for prostatic abscess.    Comparison: None available.    Technique: Axial CT images were obtained of the abdomen and pelvis following the uneventful intravenous administration of 86 mL Isovue-300. Reconstructed coronal and sagittal images were also obtained. Automated exposure control and iterative   construction methods were " used.      Findings:    Liver: The liver is unremarkable in morphology and decreased in attenuation. Small cyst within the right hepatic lobe. No biliary dilation is seen.    Gallbladder: Unremarkable.    Pancreas: 2.6 cm hypodense structure seen about the pancreatic head, possibly an exophytic pancreatic cyst. Pancreas is otherwise unremarkable.    Spleen: Unremarkable.    Adrenal glands: Unremarkable.    Genitourinary tract: Right renal cyst measures approximately 8 cm. Left kidney is unremarkable. No hydronephrosis is seen. Visualized portions of the ureters are unremarkable. Prostate gland is enlarged. Bladder wall thickening may be related to   underdistention, increased trabeculation, or cystitis.    Gastrointestinal tract: Colonic diverticulosis is present. The hollow viscera appear otherwise unremarkable. There is no evidence of bowel obstruction.    Appendix: The appendix is not identified.    Other findings: No free air or free fluid is identified. No pathologically enlarged lymph nodes are seen. Vascular calcifications are present. The IVC is unremarkable.    Bones and soft tissues: No acute or suspicious osseous or soft tissue lesion is identified.    Lung bases: The visualized lung bases are clear.      Impression:      Impression:  1.Prostatomegaly. No prostatic abscess is identified.   2.Bladder wall thickening may be related to underdistention, increased trabeculation, or cystitis. Please correlate with urinalysis.  3. 2.6 cm hypodense structure seen about the pancreatic head, possibly an exophytic pancreatic cyst. Pancreatic protocol MRI may be useful to further characterize this finding.  4.Colonic diverticulosis.  5.Hepatic steatosis.  6.Additional findings as detailed above.      Electronically Signed: Jeremy Mcneal MD    12/19/2024 12:01 PM EST    Workstation ID: [id number]    XR Chest 1 View [[id number]] Collected: 12/19/24 1015     Updated: 12/19/24 1019    Narrative:      XR CHEST 1 VW    Date  of Exam: 12/19/2024 9:57 AM EST    Indication: tacy    Comparison: Chest radiograph 6/19/2007.    Findings:  Cardiomediastinal silhouette is within normal limits. No focal consolidation. No pleural effusion or pneumothorax. Osseous structures are unremarkable.      Impression:      Impression:  No evidence of acute cardiopulmonary disease.       Electronically Signed: Sid Parson MD    12/19/2024 10:16 AM EST    Workstation ID: [id number]            Assessment / Plan      Assessment/Plan:   Vishal Dejesus is a 74 y.o. male who presents to the hospital for concern for post-prostate biopsy infection. He has hx of chronic urinary retention with elevated bladder volumes requiring CIC and elevated PSA s/p prostate biopsy 12/10/24. Path revealed high risk prostate cancer. Presented to ED with severe chill/rigors and weakness. In ED, Tm 100.4. WBC 4.22. UA/UCX pending. Receiving vanc/cefepime.     - Continue broad spectrum antibiotics and tailor appropriately based on UCX results  - Continue to CIC per patient regimen/need   - Can consider anchoring catheter if patient is having significant fevers or worsening clinical status      I discussed the patients findings and my recommendations with the patient.

## 2024-12-19 NOTE — ED NOTES
Vishal Dejesus    Nursing Report ED to Floor:  Mental status: alert and oriented x4  Ambulatory status: up with x1 assist  Oxygen Therapy:  room air  Cardiac Rhythm: a-fib  Admitted from: ED  Safety Concerns:  falls risk  Social Issues: n/a  ED Room #:  7    ED Nurse Phone Extension - [phone] or may call [phone].      HPI:   Chief Complaint   Patient presents with    Weakness - Generalized       Past Medical History:  Past Medical History:   Diagnosis Date    A-fib     Arthritis     Coronary artery disease     Elevated PSA 11-21-24    See MRI    Hyperlipidemia     Hypertension     Obesity         Past Surgical History:  Past Surgical History:   Procedure Laterality Date    APPENDECTOMY  1986    CARDIAC ABLATION  2013    KNEE SURGERY      VASECTOMY  2001    No        Admitting Doctor:   Gagandeep Kelly DO    Consulting Provider(s):  Consults       No orders found from 11/20/2024 to 12/20/2024.             Admitting Diagnosis:   The primary encounter diagnosis was Urinary tract infection with hematuria, site unspecified. Diagnoses of Chronic anticoagulation, Penicillin allergy, Lactic acidosis, and Pancreatic cyst were also pertinent to this visit.    Most Recent Vitals:   Vitals:    12/19/24 1232 12/19/24 1245 12/19/24 1247 12/19/24 1300   BP: 93/60 90/62 90/62 102/63   BP Location:       Patient Position:       Pulse: 106 105 112 101   Resp:       Temp:       TempSrc:       SpO2: 94% 94% 94% 94%   Weight:       Height:           Active LDAs/IV Access:   Lines, Drains & Airways       Active LDAs       Name Placement date Placement time Site Days    Peripheral IV 12/19/24 1045 Anterior;Right Forearm 12/19/24  1045  Forearm  less than 1                    Labs (abnormal labs have a star):   Labs Reviewed   COMPREHENSIVE METABOLIC PANEL - Abnormal; Notable for the following components:       Result Value    Glucose 112 (*)     CO2 20.0 (*)     All other components within normal limits    Narrative:     GFR Categories in  "Chronic Kidney Disease (CKD)      GFR Category          GFR (mL/min/1.73)    Interpretation  G1                     90 or greater         Normal or high (1)  G2                      60-89                Mild decrease (1)  G3a                   45-59                Mild to moderate decrease  G3b                   30-44                Moderate to severe decrease  G4                    15-29                Severe decrease  G5                    14 or less           Kidney failure          (1)In the absence of evidence of kidney disease, neither GFR category G1 or G2 fulfill the criteria for CKD.    eGFR calculation 2021 CKD-EPI creatinine equation, which does not include race as a factor   PROCALCITONIN - Abnormal; Notable for the following components:    Procalcitonin 1.08 (*)     All other components within normal limits    Narrative:     As a Marker for Sepsis (Non-Neonates):    1. <0.5 ng/mL represents a low risk of severe sepsis and/or septic shock.  2. >2 ng/mL represents a high risk of severe sepsis and/or septic shock.    As a Marker for Lower Respiratory Tract Infections that require antibiotic therapy:    PCT on Admission    Antibiotic Therapy       6-12 Hrs later    >0.5                Strongly Recommended  >0.25 - <0.5        Recommended   0.1 - 0.25          Discouraged              Remeasure/reassess PCT  <0.1                Strongly Discouraged     Remeasure/reassess PCT    As 28 day mortality risk marker: \"Change in Procalcitonin Result\" (>80% or <=80%) if Day 0 (or Day 1) and Day 4 values are available. Refer to http://[email or web address]-pct-calculator.[email or web address]    Change in PCT <=80%  A decrease of PCT levels below or equal to 80% defines a positive change in PCT test result representing a higher risk for 28-day all-cause mortality of patients diagnosed with severe sepsis for septic shock.    Change in PCT >80%  A decrease of PCT levels of more than 80% defines a negative change in PCT result representing a lower " risk for 28-day all-cause mortality of patients diagnosed with severe sepsis or septic shock.      LACTIC ACID, PLASMA - Abnormal; Notable for the following components:    Lactate 3.2 (*)     All other components within normal limits   CBC WITH AUTO DIFFERENTIAL - Abnormal; Notable for the following components:    Platelets 112 (*)     Neutrophil % 96.0 (*)     Lymphocyte % 2.8 (*)     Monocyte % 0.5 (*)     Eosinophil % 0.0 (*)     Lymphocytes, Absolute 0.12 (*)     Monocytes, Absolute 0.02 (*)     All other components within normal limits   COVID-19/FLUA&B/RSV, NP SWAB IN TRANSPORT MEDIA 1 HR TAT - Normal    Narrative:     Fact sheet for providers: https://www.fda.gov/media/299054/download    Fact sheet for patients: https://www.fda.gov/media/618889/download    Test performed by PCR.   BLOOD CULTURE   BLOOD CULTURE   URINALYSIS W/ CULTURE IF INDICATED   LACTIC ACID, REFLEX   CBC AND DIFFERENTIAL    Narrative:     The following orders were created for panel order CBC & Differential.  Procedure                               Abnormality         Status                     ---------                               -----------         ------                     CBC Auto Differential[[id number]]        Abnormal            Final result                 Please view results for these tests on the individual orders.       Meds Given in ED:   Medications   vancomycin 2750 mg/500 mL 0.9% NS IVPB ([facility]) (2,750 mg Intravenous New Bag 12/19/24 1327)   sodium chloride 0.9 % bolus 2,000 mL (2,000 mL Intravenous New Bag 12/19/24 1249)   piperacillin-tazobactam (ZOSYN) 4.5 g IVPB in 100 mL NS MBP (CD) (0 g Intravenous Stopped 12/19/24 1227)   sodium chloride 0.9 % bolus 1,000 mL (0 mL Intravenous Stopped 12/19/24 1249)   iopamidol (ISOVUE-300) 61 % injection 86 mL (85 mL Intravenous Given 12/19/24 1148)   dexAMETHasone (DECADRON) injection 10 mg (10 mg Intravenous Given 12/19/24 1236)   diphenhydrAMINE (BENADRYL) injection 25 mg (25 mg  Intravenous Given 12/19/24 1233)   famotidine (PEPCID) injection 20 mg (20 mg Intravenous Given 12/19/24 1234)   cefTRIAXone (ROCEPHIN) 2,000 mg in sodium chloride 0.9 % 100 mL MBP (0 mg Intravenous Stopped 12/19/24 1327)           Last NIH score:                                                          Dysphagia screening results:        [place] Coma Scale:  No data recorded     CIWA:        Restraint Type:            Isolation Status:  No active isolations

## 2024-12-19 NOTE — ED PROVIDER NOTES
Subjective   History of Present Illness  This is a pleasant 74-year-old male retired   accompanied by his wife.  Brigid Middleton's his cardiologist and Dr. Mendez is his urologist.  He is generally active.  He is on chronic Xarelto therapy for A-fib.  He has sleep apnea but never has been on CPAP.    He is postop day 9 prostate biopsy which is shown prostate cancer.  Scheduled for a PET scan in a week.  He has had some hematuria since the biopsy but has been clearing up.  He has to intermittently In-N-Out cath.  He has had no fevers however.    Last night after going out and having couple bourbons with some buddies he went home and proceeded to have 3 to 4 hours of severe rigors and was miserable and almost could not get out of bed today.  He has not had this in the past.    He said no runny nose sore throat or cough.  He has not had peripheral edema.  He has had no rashes.  He has started back on his Xarelto but did not have his dose today.    On arrival to the emergency department noted to be febrile but tachycardic.    All other systems are reviewed and are negative except as noted above.        Review of Systems   All other systems reviewed and are negative.      Past Medical History:   Diagnosis Date    A-fib     Arthritis     Coronary artery disease     Elevated PSA 11-21-24    See MRI    Hyperlipidemia     Hypertension     Obesity        No Known Allergies    Past Surgical History:   Procedure Laterality Date    APPENDECTOMY  1986    CARDIAC ABLATION  2013    KNEE SURGERY      VASECTOMY  2001    No       Family History   Problem Relation Age of Onset    Hypertension Mother     Stroke Mother     Hypertension Sister     Diabetes Sister        Social History     Socioeconomic History    Marital status:    Tobacco Use    Smoking status: Never    Smokeless tobacco: Never   Vaping Use    Vaping status: Never Used   Substance and Sexual Activity    Alcohol use: Yes     Alcohol/week: 10.0  standard drinks of alcohol     Types: 10 Drinks containing 0.5 oz of alcohol per week     Comment: Sure is good    Drug use: No    Sexual activity: Not Currently     Partners: Female     Birth control/protection: Vasectomy           Objective   Physical Exam  Vitals and nursing note reviewed.   Constitutional:       Comments: This is a pleasant 74-year-old male alert and oriented GCS 15.  He is okay when he is lying still but when he stands up he gets a bit woozy.  Vital signs are noted.   HENT:      Head: Normocephalic and atraumatic.      Right Ear: External ear normal.      Left Ear: External ear normal.      Nose: Nose normal.      Mouth/Throat:      Mouth: Mucous membranes are moist.      Pharynx: Oropharynx is clear.   Eyes:      Extraocular Movements: Extraocular movements intact.      Conjunctiva/sclera: Conjunctivae normal.      Pupils: Pupils are equal, round, and reactive to light.   Cardiovascular:      Pulses: Normal pulses.      Heart sounds: Normal heart sounds.      Comments: Little tachycardic without murmurs rubs gallops or heaves.  Pulmonary:      Effort: Pulmonary effort is normal.      Breath sounds: Normal breath sounds.   Abdominal:      Comments: MI 36 positive bowel sounds soft and nontender no organomegaly, masses, or guarding.  Flanks without CVA tenderness mass or rash.   Genitourinary:     Comments: Circumcised male testes and bilaterally no masses or scrotal pain or testicular pain.    Rectal exam normal sphincter tone brown guaiac-negative stool in the rectal vault.  Just feel the edge of his prostate which is just milled minimally tender.  No mass.  Musculoskeletal:      Cervical back: Normal range of motion and neck supple.   Skin:     General: Skin is warm.      Capillary Refill: Capillary refill takes less than 2 seconds.      Comments: He is little diaphoretic.   Neurological:      Mental Status: He is alert.      Comments: Face symmetric, voice soft, tongue midline.  Vision,  hearing, speech preserved.  A little mild generalized weakness without focality when he stands up he is little off balance.         Procedures           ED Course                                           Recent Results (from the past 24 hours)   ECG 12 Lead Tachycardia    Collection Time: 12/19/24  9:54 AM   Result Value Ref Range    QT Interval 316 ms    QTC Interval 456 ms   COVID-19, FLU A/B, RSV PCR 1 HR TAT - Swab, Nasopharynx    Collection Time: 12/19/24 10:23 AM    Specimen: Nasopharynx; Swab   Result Value Ref Range    COVID19 Not Detected Not Detected - Ref. Range    Influenza A PCR Not Detected Not Detected    Influenza B PCR Not Detected Not Detected    RSV, PCR Not Detected Not Detected   Comprehensive Metabolic Panel    Collection Time: 12/19/24 10:23 AM    Specimen: Blood   Result Value Ref Range    Glucose 112 (H) 65 - 99 mg/dL    BUN 12 8 - 23 mg/dL    Creatinine 0.91 0.76 - 1.27 mg/dL    Sodium 136 136 - 145 mmol/L    Potassium 3.8 3.5 - 5.2 mmol/L    Chloride 102 98 - 107 mmol/L    CO2 20.0 (L) 22.0 - 29.0 mmol/L    Calcium 8.8 8.6 - 10.5 mg/dL    Total Protein 6.1 6.0 - 8.5 g/dL    Albumin 3.8 3.5 - 5.2 g/dL    ALT (SGPT) 37 1 - 41 U/L    AST (SGOT) 29 1 - 40 U/L    Alkaline Phosphatase 67 39 - 117 U/L    Total Bilirubin 1.2 0.0 - 1.2 mg/dL    Globulin 2.3 gm/dL    A/G Ratio 1.7 g/dL    BUN/Creatinine Ratio 13.2 7.0 - 25.0    Anion Gap 14.0 5.0 - 15.0 mmol/L    eGFR 88.4 >60.0 mL/min/1.73   Procalcitonin    Collection Time: 12/19/24 10:23 AM    Specimen: Blood   Result Value Ref Range    Procalcitonin 1.08 (H) 0.00 - 0.25 ng/mL   Lactic Acid, Plasma    Collection Time: 12/19/24 10:23 AM    Specimen: Blood   Result Value Ref Range    Lactate 3.2 (C) 0.5 - 2.0 mmol/L   CBC Auto Differential    Collection Time: 12/19/24 10:23 AM    Specimen: Blood   Result Value Ref Range    WBC 4.22 3.40 - 10.80 10*3/mm3    RBC 5.39 4.14 - 5.80 10*6/mm3    Hemoglobin 16.7 13.0 - 17.7 g/dL    Hematocrit 48.8 37.5 -  51.0 %    MCV 90.5 79.0 - 97.0 fL    MCH 31.0 26.6 - 33.0 pg    MCHC 34.2 31.5 - 35.7 g/dL    RDW 13.2 12.3 - 15.4 %    RDW-SD 43.8 37.0 - 54.0 fl    MPV 9.8 6.0 - 12.0 fL    Platelets 112 (L) 140 - 450 10*3/mm3    Neutrophil % 96.0 (H) 42.7 - 76.0 %    Lymphocyte % 2.8 (L) 19.6 - 45.3 %    Monocyte % 0.5 (L) 5.0 - 12.0 %    Eosinophil % 0.0 (L) 0.3 - 6.2 %    Basophil % 0.2 0.0 - 1.5 %    Immature Grans % 0.5 0.0 - 0.5 %    Neutrophils, Absolute 4.05 1.70 - 7.00 10*3/mm3    Lymphocytes, Absolute 0.12 (L) 0.70 - 3.10 10*3/mm3    Monocytes, Absolute 0.02 (L) 0.10 - 0.90 10*3/mm3    Eosinophils, Absolute 0.00 0.00 - 0.40 10*3/mm3    Basophils, Absolute 0.01 0.00 - 0.20 10*3/mm3    Immature Grans, Absolute 0.02 0.00 - 0.05 10*3/mm3    nRBC 0.0 0.0 - 0.2 /100 WBC   STAT Lactic Acid, Reflex    Collection Time: 12/19/24  2:49 PM    Specimen: Blood   Result Value Ref Range    Lactate 3.0 (C) 0.5 - 2.0 mmol/L   Urinalysis With Culture If Indicated - Straight Cath    Collection Time: 12/19/24  2:52 PM    Specimen: Straight Cath; Urine   Result Value Ref Range    Color, UA Orange (A) Yellow, Straw    Appearance, UA Cloudy (A) Clear    pH, UA 5.5 5.0 - 8.0    Specific Gravity, UA 1.024 1.001 - 1.030    Glucose, UA Negative Negative    Ketones, UA Trace (A) Negative    Bilirubin, UA Negative Negative    Blood, UA Large (3+) (A) Negative    Protein, UA 30 mg/dL (1+) (A) Negative    Leuk Esterase, UA Moderate (2+) (A) Negative    Nitrite, UA Positive (A) Negative    Urobilinogen, UA 0.2 E.U./dL 0.2 - 1.0 E.U./dL   Urinalysis, Microscopic Only - Straight Cath    Collection Time: 12/19/24  2:52 PM    Specimen: Straight Cath; Urine   Result Value Ref Range    RBC, UA Too Numerous to Count (A) None Seen, 0-2 /HPF    WBC, UA 21-50 (A) None Seen, 0-2 /HPF    Bacteria, UA None Seen None Seen, Trace /HPF    Squamous Epithelial Cells, UA None Seen None Seen, 0-2 /HPF    Hyaline Casts, UA 0-6 0 - 6 /LPF    Methodology Automated Microscopy   "    Note: In addition to lab results from this visit, the labs listed above may include labs taken at another facility or during a different encounter within the last 24 hours. Please correlate lab times with ED admission and discharge times for further clarification of the services performed during this visit.    CT Abdomen Pelvis With Contrast   Final Result   Impression:   1.Prostatomegaly. No prostatic abscess is identified.    2.Bladder wall thickening may be related to underdistention, increased trabeculation, or cystitis. Please correlate with urinalysis.   3. 2.6 cm hypodense structure seen about the pancreatic head, possibly an exophytic pancreatic cyst. Pancreatic protocol MRI may be useful to further characterize this finding.   4.Colonic diverticulosis.   5.Hepatic steatosis.   6.Additional findings as detailed above.         Electronically Signed: Jeremy Mcneal MD     12/19/2024 12:01 PM EST     Workstation ID: [id number]      XR Chest 1 View   Final Result   Impression:   No evidence of acute cardiopulmonary disease.          Electronically Signed: Sid Parson MD     12/19/2024 10:16 AM EST     Workstation ID: [id number]        Vitals:    12/19/24 1247 12/19/24 1300 12/19/24 1434 12/19/24 1529   BP: 90/62 102/63  124/91   BP Location:    Left arm   Patient Position:    Lying   Pulse: 112 101  90   Resp:   18 18   Temp:   97.8 °F (36.6 °C) 98 °F (36.7 °C)   TempSrc:   Oral Oral   SpO2: 94% 94% 99% 94%   Weight:   124 kg (272 lb 12.8 oz)    Height:   185.4 cm (73\")      Medications   dilTIAZem CD (CARDIZEM CD) 24 hr capsule 180 mg (has no administration in time range)   rivaroxaban (XARELTO) tablet 20 mg (has no administration in time range)   aspirin EC tablet 243 mg (has no administration in time range)   acetaminophen (TYLENOL) tablet 500 mg (has no administration in time range)   sodium chloride 0.9 % flush 10 mL (has no administration in time range)   sodium chloride 0.9 % flush 10 mL (has no " administration in time range)   sodium chloride 0.9 % infusion 40 mL (has no administration in time range)   nitroglycerin (NITROSTAT) SL tablet 0.4 mg (has no administration in time range)   lactated ringers infusion (100 mL/hr Intravenous New Bag 12/19/24 1554)   Potassium Replacement - Follow Nurse / BPA Driven Protocol (has no administration in time range)   Magnesium Standard Dose Replacement - Follow Nurse / BPA Driven Protocol (has no administration in time range)   Phosphorus Replacement - Follow Nurse / BPA Driven Protocol (has no administration in time range)   Calcium Replacement - Follow Nurse / BPA Driven Protocol (has no administration in time range)   acetaminophen (TYLENOL) tablet 1,000 mg (has no administration in time range)   sennosides-docusate (PERICOLACE) 8.6-50 MG per tablet 2 tablet (has no administration in time range)     And   polyethylene glycol (MIRALAX) packet 17 g (has no administration in time range)     And   bisacodyl (DULCOLAX) EC tablet 5 mg (has no administration in time range)     And   bisacodyl (DULCOLAX) suppository 10 mg (has no administration in time range)   ondansetron (ZOFRAN) injection 4 mg (has no administration in time range)   famotidine (PEPCID) tablet 40 mg (has no administration in time range)   cefepime 2000 mg IVPB in 100 mL NS (MBP) (has no administration in time range)   cefepime 2000 mg IVPB in 100 mL NS (MBP) (has no administration in time range)   tamsulosin (FLOMAX) 24 hr capsule 0.4 mg (has no administration in time range)   vancomycin 2750 mg/500 mL 0.9% NS IVPB ([facility]) (2,750 mg Intravenous New Bag 12/19/24 1327)   piperacillin-tazobactam (ZOSYN) 4.5 g IVPB in 100 mL NS MBP (CD) (0 g Intravenous Stopped 12/19/24 1227)   sodium chloride 0.9 % bolus 1,000 mL (0 mL Intravenous Stopped 12/19/24 1249)   iopamidol (ISOVUE-300) 61 % injection 86 mL (85 mL Intravenous Given 12/19/24 1148)   dexAMETHasone (DECADRON) injection 10 mg (10 mg Intravenous Given  12/19/24 1236)   diphenhydrAMINE (BENADRYL) injection 25 mg (25 mg Intravenous Given 12/19/24 1233)   famotidine (PEPCID) injection 20 mg (20 mg Intravenous Given 12/19/24 1234)   cefTRIAXone (ROCEPHIN) 2,000 mg in sodium chloride 0.9 % 100 mL MBP (0 mg Intravenous Stopped 12/19/24 1327)   sodium chloride 0.9 % bolus 2,000 mL (0 mL Intravenous Stopped 12/19/24 1406)     ECG/EMG Results (last 24 hours)       Procedure Component Value Units Date/Time    ECG 12 Lead Tachycardia [[id number]] Collected: 12/19/24 0954     Updated: 12/19/24 1102     QT Interval 316 ms      QTC Interval 456 ms     Narrative:      Test Reason : Tachycardia  Blood Pressure :   */*   mmHG  Vent. Rate : 125 BPM     Atrial Rate : 120 BPM     P-R Int :   * ms          QRS Dur : 100 ms      QT Int : 316 ms       P-R-T Axes :   * -11  35 degrees    QTcB Int : 456 ms    Atrial fibrillation with rapid ventricular response  Incomplete right bundle branch block  Nonspecific ST abnormality  Abnormal ECG  No previous ECGs available  Confirmed by CATHERINE MORENO MD ([id number]) on 12/19/2024 11:02:26 AM    Referred By: diana           Confirmed By: CATHERINE MORENO MD          ECG 12 Lead Tachycardia   Final Result   Test Reason : Tachycardia   Blood Pressure :   */*   mmHG   Vent. Rate : 125 BPM     Atrial Rate : 120 BPM      P-R Int :   * ms          QRS Dur : 100 ms       QT Int : 316 ms       P-R-T Axes :   * -11  35 degrees     QTcB Int : 456 ms      Atrial fibrillation with rapid ventricular response   Incomplete right bundle branch block   Nonspecific ST abnormality   Abnormal ECG   No previous ECGs available   Confirmed by CATHERINE MORENO MD ([id number]) on 12/19/2024 11:02:26 AM      Referred By: diana           Confirmed By: CATHERINE MORENO MD                    Medical Decision Making        I reviewed all available studies the bedside with the patient I personally reviewed his CT scan of the abdomen pelvis.    He has no prostatic abscess but does appear to be some  cystitis.  Amador early urosepsis his lactic acid is elevated Pro-Raul is elevated but he has a normal white blood cell count.    In the emergency department he was treated with IV fluids he actually dropped his blood pressure little bit    He also received IV Zosyn and about 10 minutes and his infusion developed some throat irritation and tightness.  Infusion was immediately stopped.  He has had penicillin in the past no history of allergies to penicillin.  This was probably 30 minutes after his CTA with IV contrast.  Assume it is a penicillin reaction.  He received IV Decadron, Benadryl, and Pepcid on recheck he was feeling much improved with no throat irritation or swelling and on reexam throat was clear.    Incidentally noted pancreatic cyst will probably require outpatient follow-up.  Discussed this with the patient.  Scheduled for a CT PET scan in about a week.    Patient was started on IV Rocephin and IV vancomycin after blood cultures.  Think needs be admitted for serial exams ongoing hydration I spoke with Dr. Live the patient's urologist or one of his associates will see him in consultation and I spoke with Dr. Currie and her and her colleagues admit the patient.    All are agreeable with the plan    Problems Addressed:  Chronic anticoagulation: complicated acute illness or injury  Lactic acidosis: complicated acute illness or injury  Pancreatic cyst: complicated acute illness or injury  Penicillin allergy: complicated acute illness or injury  Urinary tract infection with hematuria, site unspecified: complicated acute illness or injury    Amount and/or Complexity of Data Reviewed  Independent Historian: spouse  External Data Reviewed: notes.  Labs: ordered. Decision-making details documented in ED Course.  Radiology: ordered and independent interpretation performed. Decision-making details documented in ED Course.  ECG/medicine tests: ordered and independent interpretation performed. Decision-making  details documented in ED Course.    Risk  Prescription drug management.  Drug therapy requiring intensive monitoring for toxicity.  Decision regarding hospitalization.        Final diagnoses:   Urinary tract infection with hematuria, site unspecified   Chronic anticoagulation   Penicillin allergy   Lactic acidosis   Pancreatic cyst       ED Disposition  ED Disposition       ED Disposition   Decision to Admit    Condition   --    Comment   Level of Care: Telemetry [5]   Diagnosis: Sepsis [[id number]]   Admitting Physician: ISABELLA MÉNDEZ [[id number]   Attending Physician: ISABELLA MÉNDEZ [[id number]]                 No follow-up provider specified.       Medication List      No changes were made to your prescriptions during this visit.            Denny Moon MD  12/19/24 2503

## 2024-12-19 NOTE — H&P
[facility] Medicine Services  HISTORY AND PHYSICAL    Patient Name: Vishal Dejesus  : 1950  MRN: [id number]  Primary Care Physician: Orlando Curtis MD  Date of admission: 2024      Subjective   Subjective     Chief Complaint:  Weakness, rigors, fever     HPI:  Vishal Dejesus is a 74 y.o. male with past medical history significant for urinary retention who chronically straight caths, prostate cancer status post recent prostate biopsy on 12/10, hypertension, paroxysmal atrial fibrillation on Xarelto, presented to the ER for weakness, rigors and fevers.  Patient states that he awoke around 3 AM with sweats, chills, body aches and profound weakness.  He indicates that prior to this he was feeling his normal self.  He denies any abdominal discomfort or pain with urination.  He typically straight caths himself and was able to produce 700 cc overnight in this morning, but notes that he has had hematuria since his prostate biopsy.  While in the ER, patient was treated with Zosyn and vancomycin as well as underwent CT abdomen pelvis with contrast, subsequently developed shortness of breath and felt as if his throat was swelling.  He received Decadron, Benadryl and famotidine with improvement.      Personal History     Past Medical History:   Diagnosis Date    A-fib     Arthritis     Coronary artery disease     Elevated PSA 24    See MRI    Hyperlipidemia     Hypertension     Obesity            Past Surgical History:   Procedure Laterality Date    APPENDECTOMY      CARDIAC ABLATION  2013    KNEE SURGERY      VASECTOMY      No       Family History: family history includes Diabetes in his sister; Hypertension in his mother and sister; Stroke in his mother.     Social History:  reports that he has never smoked. He has never used smokeless tobacco. He reports current alcohol use of about 10.0 standard drinks of alcohol per week. He reports that he does not use  drugs.  Social History     Social History Narrative    Not on file       Medications:  Available home medication information reviewed.  Silodosin, acetaminophen, aspirin, dilTIAZem CD, losartan, metoprolol succinate XL, and rivaroxaban    No Known Allergies    Objective   Objective     Vital Signs:   Temp:  [100.4 °F (38 °C)] 100.4 °F (38 °C)  Heart Rate:  [101-127] 101  Resp:  [20] 20  BP: ()/(60-98) 102/63       Physical Exam   General appearance: alert, awake, oriented, no acute distress   Head/Eyes: atraumatic, normocephalic, non-injected conjunctivae, EOMI   Cardiovascular: RRR, no murmurs or rubs, radial pulse full 2/4 BL   Respiratory: CTAB, no crackles or wheezes   Abdomen: soft, non-tender, no organomegaly, bowel sounds normoactive   : Mildly distended bladder  Musculoskeletal: moves all 4 extremities  Neuro/CNS: alert and oriented x3, normal speech, strength and sensation grossly intact  Skin: dry, normal color, normal temperature, no rash  Psychiatry: normal affect, normal judgment/insight, normal mood      Result Review:  I have personally reviewed the results from the time of this admission to 12/19/2024 13:28 EST and agree with these findings:  [x]  Laboratory list / accordion  []  Microbiology  [x]  Radiology  [x]  EKG/Telemetry   []  Cardiology/Vascular   []  Pathology  [x]  Old records  []  Other:  Most notable findings include: Lactic acidosis, hypotension, elevated procalcitonin      LAB RESULTS:      Lab 12/19/24  1023   WBC 4.22   HEMOGLOBIN 16.7   HEMATOCRIT 48.8   PLATELETS 112*   NEUTROS ABS 4.05   IMMATURE GRANS (ABS) 0.02   LYMPHS ABS 0.12*   MONOS ABS 0.02*   EOS ABS 0.00   MCV 90.5   PROCALCITONIN 1.08*   LACTATE 3.2*         Lab 12/19/24  1023   SODIUM 136   POTASSIUM 3.8   CHLORIDE 102   CO2 20.0*   ANION GAP 14.0   BUN 12   CREATININE 0.91   EGFR 88.4   GLUCOSE 112*   CALCIUM 8.8         Lab 12/19/24  1023   TOTAL PROTEIN 6.1   ALBUMIN 3.8   GLOBULIN 2.3   ALT (SGPT) 37   AST  (SGOT) 29   BILIRUBIN 1.2   ALK PHOS 67                         Microbiology Results (last 10 days)       Procedure Component Value - Date/Time    COVID-19, FLU A/B, RSV PCR 1 HR TAT - Swab, Nasopharynx [[id number]]  (Normal) Collected: 12/19/24 1023    Lab Status: Final result Specimen: Swab from Nasopharynx Updated: 12/19/24 1116     COVID19 Not Detected     Influenza A PCR Not Detected     Influenza B PCR Not Detected     RSV, PCR Not Detected    Narrative:      Fact sheet for providers: https://www.fda.gov/media/819547/download    Fact sheet for patients: https://www.fda.gov/media/664282/download    Test performed by PCR.            CT Abdomen Pelvis With Contrast    Result Date: 12/19/2024  CT ABDOMEN PELVIS W CONTRAST Date of Exam: 12/19/2024 11:44 AM EST Indication: Postop day 9 prostate biopsy.  Currently septic.  Please evaluate for prostatic abscess. Comparison: None available. Technique: Axial CT images were obtained of the abdomen and pelvis following the uneventful intravenous administration of 86 mL Isovue-300. Reconstructed coronal and sagittal images were also obtained. Automated exposure control and iterative construction methods were used. Findings: Liver: The liver is unremarkable in morphology and decreased in attenuation. Small cyst within the right hepatic lobe. No biliary dilation is seen. Gallbladder: Unremarkable. Pancreas: 2.6 cm hypodense structure seen about the pancreatic head, possibly an exophytic pancreatic cyst. Pancreas is otherwise unremarkable. Spleen: Unremarkable. Adrenal glands: Unremarkable. Genitourinary tract: Right renal cyst measures approximately 8 cm. Left kidney is unremarkable. No hydronephrosis is seen. Visualized portions of the ureters are unremarkable. Prostate gland is enlarged. Bladder wall thickening may be related to underdistention, increased trabeculation, or cystitis. Gastrointestinal tract: Colonic diverticulosis is present. The hollow viscera appear  otherwise unremarkable. There is no evidence of bowel obstruction. Appendix: The appendix is not identified. Other findings: No free air or free fluid is identified. No pathologically enlarged lymph nodes are seen. Vascular calcifications are present. The IVC is unremarkable. Bones and soft tissues: No acute or suspicious osseous or soft tissue lesion is identified. Lung bases: The visualized lung bases are clear.     Impression: Impression: 1.Prostatomegaly. No prostatic abscess is identified. 2.Bladder wall thickening may be related to underdistention, increased trabeculation, or cystitis. Please correlate with urinalysis. 3. 2.6 cm hypodense structure seen about the pancreatic head, possibly an exophytic pancreatic cyst. Pancreatic protocol MRI may be useful to further characterize this finding. 4.Colonic diverticulosis. 5.Hepatic steatosis. 6.Additional findings as detailed above. Electronically Signed: Jeremy Mcneal MD  12/19/2024 12:01 PM EST  Workstation ID: [id number]    XR Chest 1 View    Result Date: 12/19/2024  XR CHEST 1 VW Date of Exam: 12/19/2024 9:57 AM EST Indication: tacy Comparison: Chest radiograph 6/19/2007. Findings: Cardiomediastinal silhouette is within normal limits. No focal consolidation. No pleural effusion or pneumothorax. Osseous structures are unremarkable.     Impression: Impression: No evidence of acute cardiopulmonary disease. Electronically Signed: Sid Parson MD  12/19/2024 10:16 AM EST  Workstation ID: [id number]         Assessment & Plan   Assessment & Plan       Sepsis secondary to UTI    Benign essential hypertension    Benign prostatic hyperplasia    Simple renal cyst    Pancreatic cyst        Sepsis secondary to suspected UTI  Hypotension  Suspect allergic reaction to Zosyn  Chronic urinary retention  Prostate cancer  -Awaiting urinalysis but based upon recent clinical history with prostate biopsy, suspect  source for sepsis  -Initially treated with vancomycin and Zosyn in  the ER but Zosyn discontinued prematurely after patient experienced edema in his throat and shortness of breath which resolved with Benadryl and Decadron.  He then received Rocephin 2000 mg  -Will plan to continue patient on cefepime  -Blood cultures pending  -If urinalysis confirms  source of infection, will consult urology  -Patient to continue self cathing, bladder scan throughout the day  -Strict I's and O's  -Aggressive IV fluid resuscitation  -CBC, BMP in a.m.    Pancreatic Cyst  -Noted on CT  -Consider dedicated MRI once sepsis resolves     HTN   -Hold home antihypertensives    Paroxysmal atrial fibrillation  -Continue Xarelto      VTE Prophylaxis:  Mechanical & pharmacologic VTE prophylaxis orders are signed & held.           CODE STATUS:    Code Status and Medical Interventions: CPR (Attempt to Resuscitate); Full Support   Ordered at: 12/19/24 1319     Level Of Support Discussed With:    Patient     Code Status (Patient has no pulse and is not breathing):    CPR (Attempt to Resuscitate)     Medical Interventions (Patient has pulse or is breathing):    Full Support       Expected Discharge   Expected Discharge Date: 12/22/2024; Expected Discharge Time:      Gagandeep Kelly,   12/19/24

## 2024-12-20 LAB
ANION GAP SERPL CALCULATED.3IONS-SCNC: 12 MMOL/L (ref 5–15)
BACTERIA SPEC AEROBE CULT: NO GROWTH
BASOPHILS # BLD AUTO: 0.02 10*3/MM3 (ref 0–0.2)
BASOPHILS NFR BLD AUTO: 0.1 % (ref 0–1.5)
BUN SERPL-MCNC: 19 MG/DL (ref 8–23)
BUN/CREAT SERPL: 20.4 (ref 7–25)
CALCIUM SPEC-SCNC: 8.3 MG/DL (ref 8.6–10.5)
CHLORIDE SERPL-SCNC: 107 MMOL/L (ref 98–107)
CO2 SERPL-SCNC: 19 MMOL/L (ref 22–29)
CREAT SERPL-MCNC: 0.93 MG/DL (ref 0.76–1.27)
D-LACTATE SERPL-SCNC: 1.9 MMOL/L (ref 0.5–2)
DEPRECATED RDW RBC AUTO: 46.2 FL (ref 37–54)
EGFRCR SERPLBLD CKD-EPI 2021: 86.2 ML/MIN/1.73
EOSINOPHIL # BLD AUTO: 0 10*3/MM3 (ref 0–0.4)
EOSINOPHIL NFR BLD AUTO: 0 % (ref 0.3–6.2)
ERYTHROCYTE [DISTWIDTH] IN BLOOD BY AUTOMATED COUNT: 13.5 % (ref 12.3–15.4)
GLUCOSE SERPL-MCNC: 158 MG/DL (ref 65–99)
HCT VFR BLD AUTO: 44.9 % (ref 37.5–51)
HGB BLD-MCNC: 15 G/DL (ref 13–17.7)
IMM GRANULOCYTES # BLD AUTO: 0.09 10*3/MM3 (ref 0–0.05)
IMM GRANULOCYTES NFR BLD AUTO: 0.7 % (ref 0–0.5)
LYMPHOCYTES # BLD AUTO: 0.58 10*3/MM3 (ref 0.7–3.1)
LYMPHOCYTES NFR BLD AUTO: 4.3 % (ref 19.6–45.3)
MCH RBC QN AUTO: 31.1 PG (ref 26.6–33)
MCHC RBC AUTO-ENTMCNC: 33.4 G/DL (ref 31.5–35.7)
MCV RBC AUTO: 93.2 FL (ref 79–97)
MONOCYTES # BLD AUTO: 0.44 10*3/MM3 (ref 0.1–0.9)
MONOCYTES NFR BLD AUTO: 3.3 % (ref 5–12)
NEUTROPHILS NFR BLD AUTO: 12.4 10*3/MM3 (ref 1.7–7)
NEUTROPHILS NFR BLD AUTO: 91.6 % (ref 42.7–76)
NRBC BLD AUTO-RTO: 0 /100 WBC (ref 0–0.2)
PLATELET # BLD AUTO: 100 10*3/MM3 (ref 140–450)
PMV BLD AUTO: 11 FL (ref 6–12)
POTASSIUM SERPL-SCNC: 4.5 MMOL/L (ref 3.5–5.2)
RBC # BLD AUTO: 4.82 10*6/MM3 (ref 4.14–5.8)
SODIUM SERPL-SCNC: 138 MMOL/L (ref 136–145)
WBC NRBC COR # BLD AUTO: 13.53 10*3/MM3 (ref 3.4–10.8)

## 2024-12-20 PROCEDURE — 25810000003 LACTATED RINGERS SOLUTION: Performed by: INTERNAL MEDICINE

## 2024-12-20 PROCEDURE — 25010000002 CEFEPIME PER 500 MG: Performed by: STUDENT IN AN ORGANIZED HEALTH CARE EDUCATION/TRAINING PROGRAM

## 2024-12-20 PROCEDURE — 25810000003 LACTATED RINGERS PER 1000 ML: Performed by: STUDENT IN AN ORGANIZED HEALTH CARE EDUCATION/TRAINING PROGRAM

## 2024-12-20 PROCEDURE — 85025 COMPLETE CBC W/AUTO DIFF WBC: CPT | Performed by: STUDENT IN AN ORGANIZED HEALTH CARE EDUCATION/TRAINING PROGRAM

## 2024-12-20 PROCEDURE — 63710000001 DIPHENHYDRAMINE PER 50 MG: Performed by: INTERNAL MEDICINE

## 2024-12-20 PROCEDURE — 99232 SBSQ HOSP IP/OBS MODERATE 35: CPT | Performed by: INTERNAL MEDICINE

## 2024-12-20 PROCEDURE — 25010000002 VANCOMYCIN 2-0.9 GM/500ML-% SOLUTION: Performed by: INTERNAL MEDICINE

## 2024-12-20 PROCEDURE — 80048 BASIC METABOLIC PNL TOTAL CA: CPT | Performed by: STUDENT IN AN ORGANIZED HEALTH CARE EDUCATION/TRAINING PROGRAM

## 2024-12-20 PROCEDURE — 99232 SBSQ HOSP IP/OBS MODERATE 35: CPT | Performed by: STUDENT IN AN ORGANIZED HEALTH CARE EDUCATION/TRAINING PROGRAM

## 2024-12-20 PROCEDURE — 83605 ASSAY OF LACTIC ACID: CPT | Performed by: EMERGENCY MEDICINE

## 2024-12-20 RX ORDER — ACETAMINOPHEN 325 MG/1
650 TABLET ORAL EVERY 6 HOURS PRN
Status: DISCONTINUED | OUTPATIENT
Start: 2024-12-20 | End: 2024-12-21 | Stop reason: HOSPADM

## 2024-12-20 RX ORDER — METOPROLOL SUCCINATE 25 MG/1
25 TABLET, EXTENDED RELEASE ORAL
Status: DISCONTINUED | OUTPATIENT
Start: 2024-12-20 | End: 2024-12-21 | Stop reason: HOSPADM

## 2024-12-20 RX ORDER — DIPHENHYDRAMINE HCL 25 MG
25 CAPSULE ORAL ONCE
Status: COMPLETED | OUTPATIENT
Start: 2024-12-20 | End: 2024-12-20

## 2024-12-20 RX ORDER — VANCOMYCIN 2 GRAM/500 ML IN 0.9 % SODIUM CHLORIDE INTRAVENOUS
2000 ONCE
Status: COMPLETED | OUTPATIENT
Start: 2024-12-20 | End: 2024-12-20

## 2024-12-20 RX ADMIN — CEFEPIME 2000 MG: 2 INJECTION, POWDER, FOR SOLUTION INTRAVENOUS at 13:55

## 2024-12-20 RX ADMIN — RIVAROXABAN 20 MG: 20 TABLET, FILM COATED ORAL at 07:40

## 2024-12-20 RX ADMIN — Medication 10 MG: at 21:45

## 2024-12-20 RX ADMIN — Medication 10 ML: at 21:24

## 2024-12-20 RX ADMIN — CEFEPIME 2000 MG: 2 INJECTION, POWDER, FOR SOLUTION INTRAVENOUS at 05:43

## 2024-12-20 RX ADMIN — ACETAMINOPHEN 1000 MG: 500 TABLET, FILM COATED ORAL at 07:55

## 2024-12-20 RX ADMIN — SODIUM CHLORIDE, SODIUM LACTATE, POTASSIUM CHLORIDE, CALCIUM CHLORIDE 100 ML/HR: 20; 30; 600; 310 INJECTION, SOLUTION INTRAVENOUS at 05:44

## 2024-12-20 RX ADMIN — DIPHENHYDRAMINE HYDROCHLORIDE 25 MG: 25 CAPSULE ORAL at 16:06

## 2024-12-20 RX ADMIN — FAMOTIDINE 40 MG: 20 TABLET, FILM COATED ORAL at 07:40

## 2024-12-20 RX ADMIN — ACETAMINOPHEN 1000 MG: 500 TABLET, FILM COATED ORAL at 07:39

## 2024-12-20 RX ADMIN — Medication 2000 MG: at 13:59

## 2024-12-20 RX ADMIN — CEFEPIME 2000 MG: 2 INJECTION, POWDER, FOR SOLUTION INTRAVENOUS at 21:25

## 2024-12-20 RX ADMIN — SODIUM CHLORIDE, SODIUM LACTATE, POTASSIUM CHLORIDE, CALCIUM CHLORIDE 1000 ML: 20; 30; 600; 310 INJECTION, SOLUTION INTRAVENOUS at 10:41

## 2024-12-20 RX ADMIN — DILTIAZEM HYDROCHLORIDE 180 MG: 180 CAPSULE, EXTENDED RELEASE ORAL at 07:40

## 2024-12-20 RX ADMIN — TAMSULOSIN HYDROCHLORIDE 0.4 MG: 0.4 CAPSULE ORAL at 21:25

## 2024-12-20 NOTE — PROGRESS NOTES
[facility]   Urology Progress Note    Patient Name: Vishal Dejesus  : 1950  MRN: [id number]  Primary Care Physician:  Orlando Curtis MD  Date of admission: 2024    Subjective   Subjective     HPI:  NAEON. Patient without any new complaints. Denies fevers. Had BM. Performing CIC without issue.    Review of Systems   All systems were reviewed and negative except for: above    Objective   Objective     Vitals:   Temp:  [97.8 °F (36.6 °C)-98 °F (36.7 °C)] 98 °F (36.7 °C)  Heart Rate:  [] 83  Resp:  [16-20] 20  BP: ()/(60-98) 141/96  Physical Exam   GEN: NAD  HEENT: NCAT, EOMI  PULM: No respiratory distress  CV: Appears well perfused  ABD: Non-distended  : Deferred  EXT: No obvious deformities  MSK: Normal ROM BL UE  NEURO: No focal deficits, AOx3  PSYCH: Appropriate mood and affect      Result Review    Result Review:  I have personally reviewed the results from the time of this admission to 2024 09:32 EST and agree with these findings:  [x]  Laboratory  [x]  Microbiology  []  Radiology  []  EKG/Telemetry   []  Cardiology/Vascular   []  Pathology  [x]  Old records  [x]  Other: Vitals    Most notable findings include: AF/HDS. WBC 13.5. UA WBC, RBC, nitrite. UCX pending.      Assessment & Plan   Assessment / Plan     Brief Patient Summary:  Vishal Dejesus is a 74 y.o. male who presents to the hospital for concern for post-prostate biopsy infection. He has hx of chronic urinary retention with elevated bladder volumes requiring CIC and elevated PSA s/p prostate biopsy 12/10/24. Path revealed high risk prostate cancer. Presented to ED with severe chill/rigors and weakness. In ED, Tm 100.4. WBC 4.22. UA/UCX pending. Receiving vanc/cefepime.      Active Hospital Problems:  Active Hospital Problems    Diagnosis     **Sepsis secondary to UTI     Pancreatic cyst     Benign essential hypertension     Benign prostatic hyperplasia     Simple renal cyst        Plan:   - Continue broad  spectrum antibiotics and tailor appropriately based on UCX results  - Continue to CIC per patient regimen/need   - Can consider anchoring catheter if patient is having significant fevers or worsening clinical status         VTE Prophylaxis:  Pharmacologic & mechanical VTE prophylaxis orders are present.        CODE STATUS:   Level Of Support Discussed With: Patient  Code Status (Patient has no pulse and is not breathing): CPR (Attempt to Resuscitate)  Medical Interventions (Patient has pulse or is breathing): Full Support    Disposition:  Per primary    Electronically signed by Wayne Weems MD, 12/20/24, 9:32 AM EST.

## 2024-12-20 NOTE — PLAN OF CARE
"Goal Outcome Evaluation:   Problem: Adult Inpatient Plan of Care  Goal: Absence of Hospital-Acquired Illness or Injury  Intervention: Identify and Manage Fall Risk  Recent Flowsheet Documentation  Taken 12/20/2024 0400 by Dahiana Barrios RN  Safety Promotion/Fall Prevention: safety round/check completed  Taken 12/20/2024 0200 by Dahiana Barrios RN  Safety Promotion/Fall Prevention: safety round/check completed  Taken 12/20/2024 0016 by Dahiana Barrios RN  Safety Promotion/Fall Prevention: safety round/check completed  Taken 12/19/2024 2215 by Dahiana Barrios RN  Safety Promotion/Fall Prevention: safety round/check completed  Taken 12/19/2024 2052 by Dahiana Barrios RN  Safety Promotion/Fall Prevention:   safety round/check completed   assistive device/personal items within reach   clutter free environment maintained   room organization consistent     Problem: Adult Inpatient Plan of Care  Goal: Absence of Hospital-Acquired Illness or Injury  Intervention: Prevent Skin Injury  Recent Flowsheet Documentation  Taken 12/20/2024 0400 by Dahiana Barrios RN  Body Position: position changed independently  Taken 12/20/2024 0200 by Dahiana Barrios RN  Body Position:   position changed independently   weight shifting  Taken 12/20/2024 0016 by Dahiana Barrios RN  Body Position:   position changed independently   weight shifting  Taken 12/19/2024 2215 by Dahiana Barrios RN  Body Position:   position changed independently   weight shifting  Taken 12/19/2024 2052 by Dahiana Barrios RN  Body Position:   position changed independently   weight shifting   Admit 12/19 r/t sepsis secondary to UTI. VSS, denies any pain or discomfort. Pt straight caths self at home and here in hospital, refuses bladder scans at this time, stating: \"I usually cath my self in the morning and evening before I go to bed.\" In bed resting with call light in reach, will " continue to monitor.

## 2024-12-20 NOTE — CASE MANAGEMENT/SOCIAL WORK
Continued Stay Note  [facility]     Patient Name: Vishal Djeesus  MRN: [id number]  Today's Date: 12/20/2024    Admit Date: 12/19/2024    Plan: home   Discharge Plan       Row Name 12/20/24 1026       Plan    Plan home    Patient/Family in Agreement with Plan yes    Plan Comments Met with patient at the bedside to discuss discharge plan. Patient's plan is home. Family will transport. No discharge needs identified. CM will continue to follow.    Final Discharge Disposition Code 01 - home or self-care                   Discharge Codes    No documentation.                 Expected Discharge Date and Time       Expected Discharge Date Expected Discharge Time    Dec 22, 2024               Aneta Gold RN

## 2024-12-21 VITALS
WEIGHT: 272.8 LBS | HEIGHT: 73 IN | HEART RATE: 54 BPM | DIASTOLIC BLOOD PRESSURE: 98 MMHG | BODY MASS INDEX: 36.15 KG/M2 | OXYGEN SATURATION: 97 % | SYSTOLIC BLOOD PRESSURE: 138 MMHG | TEMPERATURE: 95.8 F | RESPIRATION RATE: 18 BRPM

## 2024-12-21 PROBLEM — R65.10 SIRS (SYSTEMIC INFLAMMATORY RESPONSE SYNDROME): Status: ACTIVE | Noted: 2024-12-21

## 2024-12-21 LAB
ANION GAP SERPL CALCULATED.3IONS-SCNC: 12 MMOL/L (ref 5–15)
BUN SERPL-MCNC: 21 MG/DL (ref 8–23)
BUN/CREAT SERPL: 25.9 (ref 7–25)
CALCIUM SPEC-SCNC: 8.5 MG/DL (ref 8.6–10.5)
CHLORIDE SERPL-SCNC: 109 MMOL/L (ref 98–107)
CO2 SERPL-SCNC: 19 MMOL/L (ref 22–29)
CREAT SERPL-MCNC: 0.81 MG/DL (ref 0.76–1.27)
DEPRECATED RDW RBC AUTO: 46.3 FL (ref 37–54)
EGFRCR SERPLBLD CKD-EPI 2021: 92.5 ML/MIN/1.73
ERYTHROCYTE [DISTWIDTH] IN BLOOD BY AUTOMATED COUNT: 13.5 % (ref 12.3–15.4)
GLUCOSE SERPL-MCNC: 147 MG/DL (ref 65–99)
HCT VFR BLD AUTO: 43.5 % (ref 37.5–51)
HGB BLD-MCNC: 14.7 G/DL (ref 13–17.7)
MCH RBC QN AUTO: 31.1 PG (ref 26.6–33)
MCHC RBC AUTO-ENTMCNC: 33.8 G/DL (ref 31.5–35.7)
MCV RBC AUTO: 92.2 FL (ref 79–97)
PLATELET # BLD AUTO: 127 10*3/MM3 (ref 140–450)
PMV BLD AUTO: 11.5 FL (ref 6–12)
POTASSIUM SERPL-SCNC: 4.5 MMOL/L (ref 3.5–5.2)
RBC # BLD AUTO: 4.72 10*6/MM3 (ref 4.14–5.8)
SODIUM SERPL-SCNC: 140 MMOL/L (ref 136–145)
WBC NRBC COR # BLD AUTO: 11.45 10*3/MM3 (ref 3.4–10.8)

## 2024-12-21 PROCEDURE — 25010000002 CEFEPIME PER 500 MG: Performed by: STUDENT IN AN ORGANIZED HEALTH CARE EDUCATION/TRAINING PROGRAM

## 2024-12-21 PROCEDURE — 80048 BASIC METABOLIC PNL TOTAL CA: CPT

## 2024-12-21 PROCEDURE — 99238 HOSP IP/OBS DSCHRG MGMT 30/<: CPT | Performed by: INTERNAL MEDICINE

## 2024-12-21 PROCEDURE — 85027 COMPLETE CBC AUTOMATED: CPT | Performed by: INTERNAL MEDICINE

## 2024-12-21 RX ORDER — CIPROFLOXACIN 500 MG/1
500 TABLET, FILM COATED ORAL 2 TIMES DAILY
Qty: 20 TABLET | Refills: 0 | Status: SHIPPED | OUTPATIENT
Start: 2024-12-21 | End: 2024-12-31

## 2024-12-21 RX ADMIN — DILTIAZEM HYDROCHLORIDE 180 MG: 180 CAPSULE, EXTENDED RELEASE ORAL at 07:38

## 2024-12-21 RX ADMIN — CEFEPIME 2000 MG: 2 INJECTION, POWDER, FOR SOLUTION INTRAVENOUS at 05:55

## 2024-12-21 RX ADMIN — METOPROLOL SUCCINATE 25 MG: 25 TABLET, EXTENDED RELEASE ORAL at 07:38

## 2024-12-21 RX ADMIN — RIVAROXABAN 20 MG: 20 TABLET, FILM COATED ORAL at 07:38

## 2024-12-21 NOTE — DISCHARGE SUMMARY
"    [facility] Medicine Services  DISCHARGE SUMMARY    Patient Name: Vishal Dejesus  : 1950  MRN: [id number]    Date of Admission: 2024  9:35 AM  Date of Discharge:    Primary Care Physician: Orlando Curtis MD    Consults       No orders found from 2024 to 2024.            Hospital Course     Presenting Problem: SIRS, suspected  source    Active Hospital Problems    Diagnosis  POA    **SIRS (systemic inflammatory response syndrome) [R65.10]  Yes    Pancreatic cyst [K86.2]  Yes    Benign essential hypertension [I10]  Yes    Benign prostatic hyperplasia [N40.0]  Yes    Simple renal cyst [N28.1]  Yes      Resolved Hospital Problems   No resolved problems to display.          Hospital Course:  Vishal Dejesus is a 74 y.o. male w chronic urinary retention (chronic I&O cath requirement), recent prostate biopsy (12/10), paroxysmal Afib on xarelto who presented with fevers, rigors. Convincing for SIRS, but urine and blood cultures negative    Given very recent procedure and no other sx, do still suspect  related infection as etiology to this. On vanc/cefepime here --> will transition to ciprofloxacin x 10 additional days at discharge. Patient has tolerated this abx in past w some \"on edge\" symptoms but able to tolerate. With good penetration of FQ + some mild s/e of med, will do 10 days additionally to days received here total.    F/u urology as previously planned, patient declines urology recs for placed Rubio or more frequent I&O cath. We discussed infection risk of this    Discharge Follow Up Recommendations for outpatient labs/diagnostics:   F/u PCP 1 week   F/u urology as scheduled ([place])    Day of Discharge     HPI:   Feeling very well  Anxious for home now  No ongoing sx      Vital Signs:   Temp:  [95.8 °F (35.4 °C)] 95.8 °F (35.4 °C)  Resp:  [18] 18  BP: (129-138)/(93-98) 138/98      Physical Exam:  Constitutional: No acute distress, awake, " alert  Respiratory: Clear to auscultation bilaterally, respiratory effort normal   Cardiovascular: RRR, no murmurs, rubs, or gallops  Gastrointestinal: Soft, nontender, nondistended  Musculoskeletal: Muscle tone within normal limits, no joint effusions appreciated  Psychiatric: Appropriate affect, cooperative  Neurologic: Alert and oriented, facial movements symmetric and spontaneous movement of all 4 extremities grossly equal bilaterally, speech clear  Skin: No rashes    Pertinent  and/or Most Recent Results     LAB RESULTS:      Lab 12/21/24  0419 12/20/24  1040 12/20/24  0551 12/19/24  2110 12/19/24  1832 12/19/24  1449 12/19/24  1023   WBC 11.45*  --  13.53*  --   --   --  4.22   HEMOGLOBIN 14.7  --  15.0  --   --   --  16.7   HEMATOCRIT 43.5  --  44.9  --   --   --  48.8   PLATELETS 127*  --  100*  --   --   --  112*   NEUTROS ABS  --   --  12.40*  --   --   --  4.05   IMMATURE GRANS (ABS)  --   --  0.09*  --   --   --  0.02   LYMPHS ABS  --   --  0.58*  --   --   --  0.12*   MONOS ABS  --   --  0.44  --   --   --  0.02*   EOS ABS  --   --  0.00  --   --   --  0.00   MCV 92.2  --  93.2  --   --   --  90.5   PROCALCITONIN  --   --   --   --   --   --  1.08*   LACTATE  --  1.9  --  3.1* 4.1* 3.0* 3.2*         Lab 12/21/24  0419 12/20/24  0551 12/19/24  1023   SODIUM 140 138 136   POTASSIUM 4.5 4.5 3.8   CHLORIDE 109* 107 102   CO2 19.0* 19.0* 20.0*   ANION GAP 12.0 12.0 14.0   BUN 21 19 12   CREATININE 0.81 0.93 0.91   EGFR 92.5 86.2 88.4   GLUCOSE 147* 158* 112*   CALCIUM 8.5* 8.3* 8.8         Lab 12/19/24  1023   TOTAL PROTEIN 6.1   ALBUMIN 3.8   GLOBULIN 2.3   ALT (SGPT) 37   AST (SGOT) 29   BILIRUBIN 1.2   ALK PHOS 67                     Brief Urine Lab Results  (Last result in the past 365 days)        Color   Clarity   Blood   Leuk Est   Nitrite   Protein   CREAT   Urine HCG        12/19/24 1452 Orange   Cloudy   Large (3+)   Moderate (2+)   Positive   30 mg/dL (1+)                 Microbiology Results  (last 10 days)       Procedure Component Value - Date/Time    Urine Culture - Urine, Straight Cath [[id number]]  (Normal) Collected: 12/19/24 1452    Lab Status: Final result Specimen: Urine from Straight Cath Updated: 12/20/24 1447     Urine Culture No growth    Blood Culture - Blood, Arm, Right [[id number]]  (Normal) Collected: 12/19/24 1045    Lab Status: Preliminary result Specimen: Blood from Arm, Right Updated: 12/21/24 1100     Blood Culture No growth at 2 days    COVID-19, FLU A/B, RSV PCR 1 HR TAT - Swab, Nasopharynx [[id number]]  (Normal) Collected: 12/19/24 1023    Lab Status: Final result Specimen: Swab from Nasopharynx Updated: 12/19/24 1116     COVID19 Not Detected     Influenza A PCR Not Detected     Influenza B PCR Not Detected     RSV, PCR Not Detected    Narrative:      Fact sheet for providers: https://www.fda.gov/media/961619/download    Fact sheet for patients: https://www.fda.gov/media/490239/download    Test performed by PCR.    Blood Culture - Blood, Hand, Right [[id number]]  (Normal) Collected: 12/19/24 1023    Lab Status: Preliminary result Specimen: Blood from Hand, Right Updated: 12/21/24 1130     Blood Culture No growth at 2 days    Narrative:      Less than seven (7) mL's of blood was collected.  Insufficient quantity may yield false negative results.            CT Abdomen Pelvis With Contrast    Result Date: 12/19/2024  CT ABDOMEN PELVIS W CONTRAST Date of Exam: 12/19/2024 11:44 AM EST Indication: Postop day 9 prostate biopsy.  Currently septic.  Please evaluate for prostatic abscess. Comparison: None available. Technique: Axial CT images were obtained of the abdomen and pelvis following the uneventful intravenous administration of 86 mL Isovue-300. Reconstructed coronal and sagittal images were also obtained. Automated exposure control and iterative construction methods were used. Findings: Liver: The liver is unremarkable in morphology and decreased in attenuation. Small cyst within the  right hepatic lobe. No biliary dilation is seen. Gallbladder: Unremarkable. Pancreas: 2.6 cm hypodense structure seen about the pancreatic head, possibly an exophytic pancreatic cyst. Pancreas is otherwise unremarkable. Spleen: Unremarkable. Adrenal glands: Unremarkable. Genitourinary tract: Right renal cyst measures approximately 8 cm. Left kidney is unremarkable. No hydronephrosis is seen. Visualized portions of the ureters are unremarkable. Prostate gland is enlarged. Bladder wall thickening may be related to underdistention, increased trabeculation, or cystitis. Gastrointestinal tract: Colonic diverticulosis is present. The hollow viscera appear otherwise unremarkable. There is no evidence of bowel obstruction. Appendix: The appendix is not identified. Other findings: No free air or free fluid is identified. No pathologically enlarged lymph nodes are seen. Vascular calcifications are present. The IVC is unremarkable. Bones and soft tissues: No acute or suspicious osseous or soft tissue lesion is identified. Lung bases: The visualized lung bases are clear.     Impression: 1.Prostatomegaly. No prostatic abscess is identified. 2.Bladder wall thickening may be related to underdistention, increased trabeculation, or cystitis. Please correlate with urinalysis. 3. 2.6 cm hypodense structure seen about the pancreatic head, possibly an exophytic pancreatic cyst. Pancreatic protocol MRI may be useful to further characterize this finding. 4.Colonic diverticulosis. 5.Hepatic steatosis. 6.Additional findings as detailed above. Electronically Signed: Jeremy Mcneal MD  12/19/2024 12:01 PM EST  Workstation ID: [id number]    XR Chest 1 View    Result Date: 12/19/2024  XR CHEST 1 VW Date of Exam: 12/19/2024 9:57 AM EST Indication: tacy Comparison: Chest radiograph 6/19/2007. Findings: Cardiomediastinal silhouette is within normal limits. No focal consolidation. No pleural effusion or pneumothorax. Osseous structures are  unremarkable.     Impression: No evidence of acute cardiopulmonary disease. Electronically Signed: Sid Parson MD  12/19/2024 10:16 AM EST  Workstation ID: [id number]                 Plan for Follow-up of Pending Labs/Results:   Pending Labs       Order Current Status    Blood Culture - Blood, Arm, Right Preliminary result    Blood Culture - Blood, Hand, Right Preliminary result          Discharge Details        Discharge Medications        New Medications        Instructions Start Date   ciprofloxacin 500 MG tablet  Commonly known as: CIPRO   500 mg, Oral, 2 Times Daily             Continue These Medications        Instructions Start Date   acetaminophen 500 MG tablet  Commonly known as: TYLENOL   500 mg, Every 6 Hours PRN      Cartia  MG 24 hr capsule  Generic drug: dilTIAZem CD   180 mg, Daily      losartan 50 MG tablet  Commonly known as: COZAAR   50 mg, Oral, Daily, for blood pressure      metoprolol succinate XL 25 MG 24 hr tablet  Commonly known as: TOPROL-XL   25 mg, Daily      silodosin 8 MG capsule capsule  Commonly known as: RAPAFLO   8 mg, Oral, Daily      Xarelto 20 MG tablet  Generic drug: rivaroxaban   1 tablet, Daily               Allergies   Allergen Reactions    Zosyn [Piperacillin-Tazobactam In Dex] Shortness Of Breath     Has tolerated Cefepime  Inpatient reaction 12/19: throat swelling and SOA req trx.         Discharge Disposition:  Home or Self Care    Diet:  Hospital:No active diet order           Activity:      Restrictions or Other Recommendations:         CODE STATUS:    Code Status and Medical Interventions: CPR (Attempt to Resuscitate); Full Support   Ordered at: 12/19/24 1319     Level Of Support Discussed With:    Patient     Code Status (Patient has no pulse and is not breathing):    CPR (Attempt to Resuscitate)     Medical Interventions (Patient has pulse or is breathing):    Full Support       Future Appointments   Date Time Provider Department Center   12/31/2024  9:30 AM [facility]  NM ADMIN RM 1 [facility] PET None   12/31/2024 10:30 AM [facility] PET [facility] PET None   1/2/2025  9:40 AM Adam Nguyen MD [facility]       Additional Instructions for the Follow-ups that You Need to Schedule       Discharge Follow-up with PCP   As directed       Currently Documented PCP:    Orlando Curtis MD    PCP Phone Number:    [phone]     Follow Up Details: within 7 days        Discharge Follow-up with Specified Provider: Dr Nguyen follow-up per their clinic   As directed      To: Dr Nguyen follow-up per their clinic                      Mihaela English MD  12/21/24      Time Spent on Discharge:  I spent  25  minutes on this discharge activity which included: face-to-face encounter with the patient, reviewing the data in the system, coordination of the care with the nursing staff as well as consultants, documentation, and entering orders.  D/w patient and Dr Moran, urology

## 2024-12-22 ENCOUNTER — READMISSION MANAGEMENT (OUTPATIENT)
Dept: CALL CENTER | Facility: HOSPITAL | Age: 74
End: 2024-12-22
Payer: MEDICARE

## 2024-12-22 NOTE — OUTREACH NOTE
Prep Survey      Flowsheet Row Responses   [facility] facility patient discharged from? [facility]   Is LACE score < 7 ? No   Eligibility Readm Mgmt   Discharge diagnosis *SIRS (systemic inflammatory response syndrome)   Does the patient have one of the following disease processes/diagnoses(primary or secondary)? Other   Does the patient have Home health ordered? No   Is there a DME ordered? No   Prep survey completed? Yes            TONIO BUTTERFIELD - Registered Nurse

## 2024-12-24 LAB
BACTERIA SPEC AEROBE CULT: NORMAL
BACTERIA SPEC AEROBE CULT: NORMAL

## 2024-12-30 ENCOUNTER — TELEPHONE (OUTPATIENT)
Dept: UROLOGY | Facility: CLINIC | Age: 74
End: 2024-12-30
Payer: MEDICARE

## 2024-12-30 NOTE — TELEPHONE ENCOUNTER
Text message sent to Jorge Dumont, our 180 rep about the order. Will call pt when we get a update.

## 2024-12-30 NOTE — TELEPHONE ENCOUNTER
"Jorge texted back with the following message:    Joanie Manjarrez!    Looks like we tried multiple times for a week and he first answered on 12/23. The note from 12/23 says:    \"Spoke with patient, he says he has a follow up with urologist in a few days. He's unsure if he will continue cathing or need a full order. Stated he was given samples and had plenty on hand. Wanted us to follow up at the end of the month.\"    We called him this morning and got him set up and are over-nighting his order so it'll be there tomorrow  "

## 2024-12-30 NOTE — TELEPHONE ENCOUNTER
Caller:  ANITHA KELLER    Relationship: SELF     Best call back number: [phone]    What is your medical concern? PT NEEDS CATHS AS HE'S ABOUT TO RUN OUT. STATED HE IS GOING TO PLACE ORDER BUT MAY NEED SOME BEFORE THEN IF JAN COULD CALL HIM.

## 2024-12-31 ENCOUNTER — HOSPITAL ENCOUNTER (OUTPATIENT)
Facility: HOSPITAL | Age: 74
Discharge: HOME | End: 2024-12-31
Payer: MEDICARE

## 2024-12-31 DIAGNOSIS — C61 PROSTATE CANCER: ICD-10-CM

## 2024-12-31 PROCEDURE — A9596 GALLIUM GA 68 GOZETOTIDE 25 MCG KIT: HCPCS | Performed by: STUDENT IN AN ORGANIZED HEALTH CARE EDUCATION/TRAINING PROGRAM

## 2024-12-31 PROCEDURE — 78815 PET IMAGE W/CT SKULL-THIGH: CPT

## 2024-12-31 PROCEDURE — 34310000005 GALLIUM GA 68 GOZETOTIDE 25 MCG KIT: Performed by: STUDENT IN AN ORGANIZED HEALTH CARE EDUCATION/TRAINING PROGRAM

## 2024-12-31 RX ADMIN — KIT FOR THE PREPARATION OF GALLIUM GA 68 GOZETOTIDE INJECTION 1 DOSE: KIT INTRAVENOUS at 09:40

## 2024-12-31 NOTE — PROGRESS NOTES
PSMA PET scan reviewed.  Positivity within the prostate consistent with his known prostate cancer disease.  There are no obvious PSMA avid lymph nodes in the pelvis or abdomen.  I will discuss further with the patient and his planned follow-up in a few days.

## 2025-01-02 ENCOUNTER — OFFICE VISIT (OUTPATIENT)
Dept: UROLOGY | Facility: CLINIC | Age: 75
End: 2025-01-02
Payer: COMMERCIAL

## 2025-01-02 VITALS — SYSTOLIC BLOOD PRESSURE: 117 MMHG | DIASTOLIC BLOOD PRESSURE: 76 MMHG | OXYGEN SATURATION: 95 % | HEART RATE: 59 BPM

## 2025-01-02 DIAGNOSIS — C61 PROSTATE CANCER: Primary | ICD-10-CM

## 2025-01-02 DIAGNOSIS — N40.1 BPH WITH OBSTRUCTION/LOWER URINARY TRACT SYMPTOMS: ICD-10-CM

## 2025-01-02 DIAGNOSIS — N13.8 BPH WITH OBSTRUCTION/LOWER URINARY TRACT SYMPTOMS: ICD-10-CM

## 2025-01-02 DIAGNOSIS — R33.9 URINARY RETENTION: ICD-10-CM

## 2025-01-02 NOTE — PROGRESS NOTES
Follow Up Office Visit      Patient Name: Vishal Dejesus  : 1950   MRN: [id number]     Chief Complaint:    Chief Complaint   Patient presents with    Prostate Cancer       Referring Provider: No ref. provider found    History of Present Illness: Vishal Dejesus is a 74 y.o. male who presents today for follow up of grade group 2 and grade group 4 prostate cancer.  Patient has chronic urinary retention.  His PVRs were higher than 5 or 600 cc.  He currently managed with Rapaflo and intermittent catheterization.  Prior to his initial PVRs he had no idea he was in urinary retention.  States he urinates normally.  Imaging has demonstrated bladder diverticuli and trabeculation indicative of chronic retention.  He was hesitant to start catheterization but since he has initiated CIC he has been doing fairly well.  Patient has a history of elevated PSA.     Lab Results   Component Value Date    PSA 5.670 (H) 06/15/2022    PSA 5.790 (H) 2021    PSA 4.700 (H) 10/29/2019      MRI prostate was performed    Patient was then ordered to undergo prostate MRI. A 1.5 Kirstin MRI was performed at the [facility], imaging will be uploaded to the system. Report indicates a large PI-RADS 5 lesion measuring roughly 2 cm appearing to extend into the left seminal vesicle from the left posterior peripheral zone at the base of the prostate. There was also made mention of a focal lesion within the left femoral head which was indeterminate. He had a follow-up femur x-ray demonstrating no obvious findings. The patient's MRI report also indicates a severely distended urinary bladder, BPH and multifocal bladder diverticuli.     Patient proceeded with a prostate biopsy performed on 12-.  Prostate volume is 91 g.  A 17 core biopsy was taken.  This demonstrated    DIAGNOSIS:  A.   PROSTATE, NEEDLE CORE BIOPSY, RIGHT BASE:  Benign prostate tissue  No identified high grade prostatic intraepithelial neoplasia or  invasive  malignancy  B.   PROSTATE, NEEDLE CORE BIOPSY, RIGHT MID:  Prostatic adenocarcinoma, [place] score 3+3 = 6/10, grade group 1  Malignancy involves 1 of 3 cores for an estimated 10% of the submitted tissue  C.   PROSTATE, NEEDLE CORE BIOPSY, RIGHT APEX:  Invasive prostatic adenocarcinoma, Marilyn score 4+4 = 8/10, grade group 4  Perineural invasion is identified  Malignancy involves 1 of 2 cores for an estimated less than 5% of the  submitted tissue  D.   PROSTATE, NEEDLE CORE BIOPSY, LEFT BASE:  Invasive prostatic adenocarcinoma, [place] score 3+4 = 7/10, grade group 2  Malignancy involves multiple of multiple cores for an estimated 40% of  the submitted tissue  E.   PROSTATE, NEEDLE CORE BIOPSY, LEFT MID:  Invasive prostatic adenocarcinoma, Marilyn score 3+4 = 7/10, grade group 2  Malignancy involves 1 of multiple cores for an estimated less than 5% of  the submitted tissue  F.   PROSTATE, NEEDLE CORE BIOPSY, LEFT APEX:  Invasive prostatic adenocarcinoma, [place] score 3+4 = 7/10, grade group 2  Malignancy involves 2 of 2 cores for an estimated 40% of the submitted tissue     He then developed urosepsis either related to self-catheterization or recent biopsy and was admitted to the hospital in late December.  All of his cultures came back negative.  He just completed a course of fluoroquinolones.    Here today for further discussion regarding his prostate cancer.  He is opposed to radical prostatectomy.  Still catheterizing a few times per day.    He completed a PSMA PET scan 12/31/24    IMPRESSION:  Impression:  1.Focal uptake within the left posterior peripheral zone of the prostate suggestive of local disease.  2.No evidence of hyper avid metastatic disease.  3.Few scattered pulmonary micronodules measuring up to 3 mm are too small to characterize with PET. Recommend continued attention on follow-up.    Subjective      Review of System: Review of Systems   Genitourinary:  Positive for difficulty  urinating.      I have reviewed the ROS documented by my clinical staff, I have updated appropriately and I agree. Adam Nguyen MD    I have reviewed and the following portions of the patient's history were updated as appropriate: past family history, past medical history, past social history, past surgical history and problem list.    Medications:     Current Outpatient Medications:     acetaminophen (TYLENOL) 500 MG tablet, Take 1 tablet by mouth Every 6 (Six) Hours As Needed for Mild Pain., Disp: , Rfl:     CARTIA  MG 24 hr capsule, Take 1 capsule by mouth Daily., Disp: , Rfl: 0    losartan (COZAAR) 50 MG tablet, Take 1 tablet by mouth Daily. for blood pressure, Disp: , Rfl:     metoprolol succinate XL (TOPROL-XL) 25 MG 24 hr tablet, Take 1 tablet by mouth Daily., Disp: , Rfl:     silodosin (RAPAFLO) 8 MG capsule capsule, Take 1 capsule by mouth Daily., Disp: , Rfl:     Xarelto 20 MG tablet, Take 1 tablet by mouth Daily., Disp: , Rfl:     Allergies:   Allergies   Allergen Reactions    Zosyn [Piperacillin-Tazobactam In Dex] Shortness Of Breath     Has tolerated Cefepime  Inpatient reaction 12/19: throat swelling and SOA req trx.           Objective     Physical Exam:   Vital Signs:   Vitals:    01/02/25 0957   BP: 117/76   Pulse: 59   SpO2: 95%     There is no height or weight on file to calculate BMI.     Physical Exam  Constitutional:       Appearance: Normal appearance. He is obese.   HENT:      Head: Normocephalic and atraumatic.      Nose: Nose normal.   Eyes:      Extraocular Movements: Extraocular movements intact.      Conjunctiva/sclera: Conjunctivae normal.      Pupils: Pupils are equal, round, and reactive to light.   Musculoskeletal:         General: Normal range of motion.      Cervical back: Normal range of motion and neck supple.   Skin:     General: Skin is warm and dry.      Findings: No lesion or rash.   Neurological:      General: No focal deficit present.      Mental Status: He is  alert and oriented to person, place, and time. Mental status is at baseline.   Psychiatric:         Mood and Affect: Mood normal.         Behavior: Behavior normal.         Labs:   Brief Urine Lab Results  (Last result in the past 365 days)        Color   Clarity   Blood   Leuk Est   Nitrite   Protein   CREAT   Urine HCG        12/19/24 1452 Orange   Cloudy   Large (3+)   Moderate (2+)   Positive   30 mg/dL (1+)                   Urine Culture          12/19/2024    14:52   Urine Culture   Urine Culture No growth         Lab Results   Component Value Date    GLUCOSE 147 (H) 12/21/2024    CALCIUM 8.5 (L) 12/21/2024     12/21/2024    K 4.5 12/21/2024    CO2 19.0 (L) 12/21/2024     (H) 12/21/2024    BUN 21 12/21/2024    CREATININE 0.81 12/21/2024    EGFRIFAFRI 70 10/04/2024    EGFRIFNONA 82 10/29/2019    BCR 25.9 (H) 12/21/2024    ANIONGAP 12.0 12/21/2024       Lab Results   Component Value Date    WBC 11.45 (H) 12/21/2024    HGB 14.7 12/21/2024    HCT 43.5 12/21/2024    MCV 92.2 12/21/2024     (L) 12/21/2024       Images:   NM PET/CT Skull Base to Mid Thigh    Result Date: 12/31/2024  Impression: 1.Focal uptake within the left posterior peripheral zone of the prostate suggestive of local disease. 2.No evidence of hyper avid metastatic disease. 3.Few scattered pulmonary micronodules measuring up to 3 mm are too small to characterize with PET. Recommend continued attention on follow-up. Electronically Signed: Joel Downey MD  12/31/2024 2:21 PM EST  Workstation ID: [id number]    CT Abdomen Pelvis With Contrast    Result Date: 12/19/2024  Impression: 1.Prostatomegaly. No prostatic abscess is identified. 2.Bladder wall thickening may be related to underdistention, increased trabeculation, or cystitis. Please correlate with urinalysis. 3. 2.6 cm hypodense structure seen about the pancreatic head, possibly an exophytic pancreatic cyst. Pancreatic protocol MRI may be useful to further characterize this  finding. 4.Colonic diverticulosis. 5.Hepatic steatosis. 6.Additional findings as detailed above. Electronically Signed: Jeremy Mcneal MD  12/19/2024 12:01 PM EST  Workstation ID: [id number]    XR Chest 1 View    Result Date: 12/19/2024  Impression: No evidence of acute cardiopulmonary disease. Electronically Signed: Sid Parson MD  12/19/2024 10:16 AM EST  Workstation ID: [id number]    XR Femur 2 View Left    Result Date: 10/14/2024  1. No appreciable left femur or left thigh soft tissue mass is identified. Outside comparison MRI reported images are not available. Mild degenerative changes of the left hip. 3. Left knee replacement. Electronically Signed: Lashon Higuera MD  10/14/2024 1:36 PM EDT  Workstation ID: [id number]    CT ANGIOGRAM CORONARY    Result Date: 10/4/2024  1. Mild coronary calcification with an Agatston score = 111 using the AJ-130 method, which represents 32 percentile when matched for age, gender and ethnicity.  Vascular age is 71 years. 2. 47 mm aneurysm of the ascending aorta. 3. CAD-RADS 1: Management recommendations:  Consider non-atherosclerotic causes of chest pain. Consider referral for outpatient follow-up for preventative therapy and risk factor modification.   4.  Tiny ASD. 5.  Possible bronchiolitis. CRITICAL RESULT: No. COMMUNICATION: Per this written report. By electronically signing this report, I, the attending physician, attest that I have personally reviewed the images/data for the above examination(s) and agree with the final edited report. Drafted by Kris Chan MD on 10/4/2024 8:35 AM Final report signed by Yanelis Da Silva MD on 10/4/2024 10:15 AM      Measures:   Tobacco:   Vishal Dejesus  reports that he has never smoked. He has never used smokeless tobacco.     Assessment / Plan      Assessment/Plan:   74 y.o. male who presented today for follow up of prostate cancer.     I again reviewed his diagnosis of prostate cancer and how the Procious score is used in the risk  "stratification system together with PSA and clinical examination.  I discussed the Marilyn score as well as \"Grade Group Scoring\" in detail with respect to their role in tumor biology and behavior.      I then discussed the treatment options for a man with high risk disease as outlined by the NCCN in whom at least 10 years of life expectancy is anticipated:    1.  Surgery with pelvic lymph node dissection +/- adjuvant therapies  2.  External beam radiotherapy or brachytherapy       Radiation  I had a brief discussion with the patient's about some of the pros and cons to radiation, pros would include lower upfront quality of life changes and reduced initial side effects associated with urinary incontinence and erectile dysfunction, however both of these are possible long-term.  We also discussed possibilities of bladder and rectal irritation with radiation.  I also briefly mentioned the difficulty associated with post radiation prostatectomy if the patient were to develop a recurrence after definitive treatment with radiation.  This is in contrast to upfront surgery with the ability to perform salvage or adjuvant radiation if the patient experiences biochemical recurrence after definitive therapy with surgery.  Patient expressed understanding.    As I do with all patients I  with newly diagnosed prostate cancer, I encouraged him to seek out further discussion with Radiation-Oncology for a more detailed discussion, and offered him a referral if he would like.      Surgery  I reviewed the role of surgery and the relevant surgical anatomy and the role of the robotic platform.  I explained that surgery for prostate cancer exists on a continuum of quality of life outcomes and cancer control.  We reviewed that a maximal cancer surgery is also associated with maximal impacts on quality of life (erectile dysfunction and incontinence).  I explained that the approach utilized for the surgery is driven by his goals of " "cancer care and his particular pathology and staging.      I explained that incontinence after robotic or open prostatectomy is typically an inevitability, but usually improves within weeks after surgery, the majority of men are dry just a few months after surgery, but some may struggle with incontinence out to a year, and some men may still be dealing with incontinence after 12 months.  Greater than 90% of men will achieve continence at 1 year.  Failure to achieve continence after 12 months is considered abnormal, and patients are typically offered surgical treatment options to correct this if needed.    I reviewed that the rate of potency is dependent on baseline functional status and time.  Specifically if a bilateral nerve sparing procedure were performed in the setting of perfect erections pre-operatively, that we would expect roughly 85% of men to regain potency within 2 years of surgery; most of them beginning their recovery around 6-9 months from surgery.  Of these 85% of men, roughly 50% will require medications to support their erections long-term and that all men will initially require some degree of medication support.  Of the 15% of men who do not regain function, this will require either a vacuum erection device or injection therapy.  The trade-off is that with more preserved tissue there is a greater probability of positive surgical margins.  The presence of a margin would therefore be associated with an increased risk of recurrent disease and need for adjuvant and salvage therapies.       On the alternative end of the surgical spectrum we could proceed with with \"wide\" dissection bilaterally, which would maximize the tissues removed.  This would result in longer time to continence and potency only achievable with injection or vacuum erection devices given removal of the nerves which are necessary for natural erections.  The rate of continence is the same 95% at 1 year, but it takes more time to " achieve that result, with most men achieving continence around 6 months from surgery.  While this approach does not guarantee negative margins and a lack of recurrence, the rate of margins is lessened with the greater tissue removed.    We discussed the role of pelvic lymphadenectomy or removal of the pelvic lymph node tissues to assist with staging the patient and ruling out local prostate cancer spread, we also discussed that lymph node removal may offer cancer specific survival benefit in some men as well, by removing a potential source of microscopic cancer cells or potential sites of spread.  I described to the patient that I always remove lymph nodes during prostatectomy, and then I believe it gives us the best and most robust information in terms of his overall cancer staging, which may inform need for further treatment post prostatectomy if advanced disease or locally metastatic disease is found.    I also reviewed the specific procedural risks, which include, but are not limited to infection, bleeding, need for blood transfusion, and injury to surrounding structures including the rectum, small bowel, bladder, ureters, blood vessels, nerves specifically the obturator nerve.  I reviewed the general procedural risks of wound healing complications, wound infections, conversion to open procedure as well as termination of procedure, or need for additional procedures. We have discussed the risk of long term neuropraxia, air emboli, MI, DVT, PE, stroke, and death.      Survivorship   I then discussed survivorship care which consists of monitoring for recurrence and management of treatment related side effects.    I reviewed how pathology dictates follow-up and risk of recurrence.  I explained that men with treated prostate cancer (surgery or radiation) are at risk of recurrence during follow-up and that historically up around 30% of men will require adjuvant treatments; often based on the post-surgical pathology.   I additionally reviewed how monitoring is performed to maximize the benefit of adjuvant therapies should they be required.      I provided the patient a copy of my prostate cancer packet and encouraged him to review it in detail as it reinforces and expands on the risks and benefits of each approach to managing prostatectomy.    Discussion summary  Prolonged discussion with the patient and his wife today.  Given his age and desire to limit side effects and comorbidities of treatment, I believe the patient would be best served with targeted CyberKnife therapy.  He has a very small volume grade group 4 disease on the right and only a small percentage of tissue.  His PSMA PET scan demonstrates no metastases or lymph node involvement.  He has low-level uptake throughout the prostate with no discrete high intensity lesions.    I will refer him to radiation oncology to discuss CyberKnife.  I will see him back every 3 to 6 months with PSAs moving forward.      From my standpoint patient can avoid ADT, given his desire to maintain his quality of life and avoid bothersome side effects.    We discussed potential benefit of surgical prostatectomy given his concurrent urinary retention however he is not willing to consider this given the risk of urinary leakage.  He will continue catheterizing in the interim.  We discussed very thoroughly the risk benefits and alternatives to surgical prostatectomy.    Patient desires CyberKnife.    He will continue Rapaflo daily given subjective improvement in stream.   Still able to naturally urinate however persistent incomplete bladder emptying is noted on PVRs.    Diagnoses and all orders for this visit:    1. Prostate cancer (Primary)  -     [facility]    2. Urinary retention    3. BPH with obstruction/lower urinary tract symptoms           Follow Up:   Return in about 3 months (around 4/2/2025) for 3 months at [place] .    I spent approximately 45  minutes providing clinical care for this patient; including review of patient's chart and provider documentation, face to face time spent with patient in examination room (obtaining history, performing physical exam, discussing diagnosis and management options), placing orders, and completing patient documentation.     Adam Nguyen MD  [facility] [place]

## 2025-01-07 ENCOUNTER — READMISSION MANAGEMENT (OUTPATIENT)
Dept: CALL CENTER | Facility: HOSPITAL | Age: 75
End: 2025-01-07
Payer: MEDICARE

## 2025-01-07 NOTE — OUTREACH NOTE
Medical Week 3 Survey      Flowsheet Row Responses   [facility] patient discharged from? [place]   Does the patient have one of the following disease processes/diagnoses(primary or secondary)? Other   Week 3 attempt successful? No   Call start time 1805   Unsuccessful attempts Attempt 1   Discharge diagnosis *SIRS (systemic inflammatory response syndrome)            Marivel COOLN - Registered Nurse

## 2025-01-14 ENCOUNTER — READMISSION MANAGEMENT (OUTPATIENT)
Dept: CALL CENTER | Facility: HOSPITAL | Age: 75
End: 2025-01-14
Payer: MEDICARE

## 2025-01-24 ENCOUNTER — HOSPITAL ENCOUNTER (OUTPATIENT)
Dept: RADIATION ONCOLOGY | Facility: HOSPITAL | Age: 75
Setting detail: RADIATION/ONCOLOGY SERIES
Discharge: HOME | End: 2025-01-24
Payer: MEDICARE

## 2025-01-24 ENCOUNTER — OFFICE VISIT (OUTPATIENT)
Dept: RADIATION ONCOLOGY | Facility: HOSPITAL | Age: 75
End: 2025-01-24
Payer: MEDICARE

## 2025-01-24 ENCOUNTER — APPOINTMENT (OUTPATIENT)
Dept: RADIATION ONCOLOGY | Facility: HOSPITAL | Age: 75
End: 2025-01-24
Payer: MEDICARE

## 2025-01-24 VITALS
OXYGEN SATURATION: 96 % | HEIGHT: 73 IN | BODY MASS INDEX: 36.34 KG/M2 | DIASTOLIC BLOOD PRESSURE: 88 MMHG | RESPIRATION RATE: 18 BRPM | WEIGHT: 274.2 LBS | SYSTOLIC BLOOD PRESSURE: 162 MMHG | HEART RATE: 98 BPM | TEMPERATURE: 95.8 F

## 2025-01-24 DIAGNOSIS — C61 PROSTATE CANCER: Primary | ICD-10-CM

## 2025-01-24 PROCEDURE — G0463 HOSPITAL OUTPT CLINIC VISIT: HCPCS | Performed by: RADIOLOGY

## 2025-01-24 NOTE — PROGRESS NOTES
CONSULTATION NOTE      :                                                          1950  DATE OF CONSULTATION:                       2025   REQUESTING PHYSICIAN:                   Adam Nguyen MD  REASON FOR CONSULTATION:           Prostate Cancer   Cancer Staging   Stage IIC (cT1c, cN0, cM0, PSA: 7.3, Grade Group: 4)    Thank you for requesting my services in evaluation of this pleasant individual.  I am seeing them in outpatient consultation regarding a diagnosis of prostate cancer.     BRIEF HISTORY:  The patient is a very pleasant 74 y.o. male  with a past medical history significant for coronary artery disease, atrial fibrillation, and BPH, who has been monitored with serial PSAs over the past few years and was noted to have a consistent trend of his PSA increasing and it reached a highest of 7.3 NG/mL in September.  His 4K score was elevated therefore he underwent a transrectal ultrasound-guided biopsy of the prostate which revealed a mixed picture of Marilyn 4+4 = 8 disease from the right apex and a single core with 5% involvement, a Marilyn 3+4 = 7 adenocarcinoma in a total of 4 cores from the left base, left mid, and left apex, representing 5-40% of the submitted tissue, and a [place] 3+3 = 6 involving a single core from the right mid, with 10% involvement.  He had additional risk features including the presence of perineural invasion in the right apex.  He has completed staging with both a prostate MRI and a PSMA PET/CT scan. He has no evidence of other regional or distant disease, and his PET appears to show limited hypermetabolism in the left prostate.  He discussed multiple different treatment options with Dr. Nguyen and he prefers to pursue nonsurgical therapy.  They also had a long discussion regarding the different treatment options and given some of his cardiac risk factors and his desire to preserve quality of life, it is felt that he is not an ideal candidate for androgen  deprivation therapy.  I am seeing him today specifically to discuss definitive radiation therapy.  From a symptomatic standpoint, he describes an IPSS score today of 12, with relatively low erectile function and he denies any gastrointestinal symptoms.  Due to his BPH, he has been in and out self cathing for the past month.  He did experience a UTI/with sepsis and ended up in the hospital for 3 days.  States that now he is doing quite better and he is changes technique with self-cathing and feels like that is going much better now.    Allergy:   Allergies   Allergen Reactions    Zosyn [Piperacillin-Tazobactam In Dex] Shortness Of Breath     Has tolerated Cefepime  Inpatient reaction 12/19: throat swelling and SOA req trx.       Social History:   Social History     Socioeconomic History    Marital status:    Tobacco Use    Smoking status: Never    Smokeless tobacco: Never   Vaping Use    Vaping status: Never Used   Substance and Sexual Activity    Alcohol use: Yes     Alcohol/week: 10.0 standard drinks of alcohol     Types: 10 Drinks containing 0.5 oz of alcohol per week     Comment: Sure is good    Drug use: No    Sexual activity: Not Currently     Partners: Female     Birth control/protection: Vasectomy       Past Medical History:   Past Medical History:   Diagnosis Date    A-fib     Arthritis     Coronary artery disease     Elevated PSA 11-21-24    See MRI    Hyperlipidemia     Hypertension     Obesity     Prostate cancer        Family History: family history includes Diabetes in his sister; Hypertension in his mother and sister; Stroke in his mother.     Surgical History:   Past Surgical History:   Procedure Laterality Date    APPENDECTOMY  1986    CARDIAC ABLATION  2013    KNEE SURGERY Left     VASECTOMY  2001    No        Review of Systems:   Review of Systems - Oncology      IPSS Questionnaire (AUA-7):  Over the past month…    1)  Incomplete Emptying  How often have you had a sensation of not emptying  your bladder?  1 - Less than 1 time in 5   2)  Frequency  How often have you had to urinate less than every two hours? 1 - Less than 1 time in 5   3)  Intermittency  How often have you found you stopped and started again several times when you urinated?  3 - About half the time   4) Urgency  How often have you found it difficult to postpone urination?  2 - Less than half the time   5) Weak Stream  How often have you had a weak urinary stream?  3 - About half the time   6) Straining  How often have you had to push or strain to begin urination?  1 - Less than 1 time in 5   7) Nocturia  How many times did you typically get up at night to urinate?  1 - 1 time   Total Score:  12       Quality of life due to urinary symptoms:  If you were to spend the rest of your life with your urinary condition the way it is now, how would you feel about that? 2-Mostly Satisfied   Urine Leakage (Incontinence) 0-No Leakage     Sexual Health Inventory  Current Status    1)  How do you rate your confidence that you could achieve and keep an erection? 2-Low   2) When you had erections with sexual stimulation, how often were your erections hard enough for penetration (entering your partner)? 4-Most times (much more than half the time)   3)  During sexual intercourse, how often were you able to maintain your erection after you had penetrated (entered) into your partner? 3-Sometimes (about half the time)   4) During sexual intercourse, how difficult was it to maintain your erection to completion of intercourse? 3-Difficult   5) When you attempted sexual intercourse, how often was it satisfactory to you? 3-Sometime (about half the time)   Total Score: 15       Bowel Health Inventory  Current Status: 0-No problems, no rectal bleeding, no discharge, less then 5 bowel movements a day      Objective   VITAL SIGNS:   Vitals:    01/24/25 1331   BP: 162/88   Pulse: 98   Resp: 18   Temp: 95.8 °F (35.4 °C)   TempSrc: Temporal   SpO2: 96%   Weight: 124  "kg (274 lb 3.2 oz)   Height: 185.4 cm (73\")   PainSc: 0-No pain        Karnofsky score: 80       Physical Exam:   Physical Exam  Vitals and nursing note reviewed.   Constitutional:       General: He is not in acute distress.     Appearance: He is well-developed.   HENT:      Head: Normocephalic and atraumatic.   Eyes:      Conjunctiva/sclera: Conjunctivae normal.      Pupils: Pupils are equal, round, and reactive to light.   Cardiovascular:      Rate and Rhythm: Normal rate and regular rhythm.      Heart sounds: No murmur heard.     No friction rub.   Pulmonary:      Effort: Pulmonary effort is normal.      Breath sounds: Normal breath sounds. No wheezing.   Abdominal:      General: Bowel sounds are normal. There is no distension.      Palpations: Abdomen is soft. There is no mass.      Tenderness: There is no abdominal tenderness.   Genitourinary:     Comments: LUIS E was deferred  Musculoskeletal:         General: Normal range of motion.      Cervical back: Normal range of motion and neck supple.   Lymphadenopathy:      Cervical: No cervical adenopathy.   Skin:     General: Skin is warm and dry.   Neurological:      Mental Status: He is alert and oriented to person, place, and time.   Psychiatric:         Behavior: Behavior normal.         Thought Content: Thought content normal.         Judgment: Judgment normal.            Lab Results   Component Value Date     PSA  PSA 7.3 (H)  5.670 (H) 09/2024  06/15/2022     PSA 5.790 (H) 02/24/2021     PSA 4.700 (H) 10/29/2019     IMAGING  I have personally reviewed the relevant imaging studies, as follows:  NM PET/CT Skull Base to Mid Thigh    Result Date: 12/31/2024  Impression: 1.Focal uptake within the left posterior peripheral zone of the prostate suggestive of local disease. 2.No evidence of hyper avid metastatic disease. 3.Few scattered pulmonary micronodules measuring up to 3 mm are too small to characterize with PET. Recommend continued attention on follow-up. " Electronically Signed: Joel Downey MD  12/31/2024 2:21 PM EST  Workstation ID: [id number]    CT Abdomen Pelvis With Contrast    Result Date: 12/19/2024  Impression: 1.Prostatomegaly. No prostatic abscess is identified. 2.Bladder wall thickening may be related to underdistention, increased trabeculation, or cystitis. Please correlate with urinalysis. 3. 2.6 cm hypodense structure seen about the pancreatic head, possibly an exophytic pancreatic cyst. Pancreatic protocol MRI may be useful to further characterize this finding. 4.Colonic diverticulosis. 5.Hepatic steatosis. 6.Additional findings as detailed above. Electronically Signed: Jeremy Mcneal MD  12/19/2024 12:01 PM EST  Workstation ID: [id number]    CT ANGIOGRAM CORONARY    Result Date: 10/4/2024  1. Mild coronary calcification with an Agatston score = 111 using the AJ-130 method, which represents 32 percentile when matched for age, gender and ethnicity.  Vascular age is 71 years. 2. 47 mm aneurysm of the ascending aorta. 3. CAD-RADS 1: Management recommendations:  Consider non-atherosclerotic causes of chest pain. Consider referral for outpatient follow-up for preventative therapy and risk factor modification.   4.  Tiny ASD. 5.  Possible bronchiolitis. CRITICAL RESULT: No. COMMUNICATION: Per this written report. By electronically signing this report, I, the attending physician, attest that I have personally reviewed the images/data for the above examination(s) and agree with the final edited report. Drafted by Kris Chan MD on 10/4/2024 8:35 AM Final report signed by Yanelis Da Silva MD on 10/4/2024 10:15 AM     PATHOLOGY  Transrectal ultrasound-guided biopsy of the prostate 12/10/2024      The following portions of the patient's history were reviewed and updated as appropriate: allergies, current medications, past family history, past medical history, past social history, past surgical history, and problem list.    Assessment:   Assessment  Mr. Dejesus  is a 74 year old gentleman with multiple medical comorbidities and a new diagnosis of a clinical T1c, Marilyn 4+4=8 prostate adenocarcinoma with a pretreatment PSA of 7.3 ng/ml.  He has multiple cores positive, but the quantity of [place] 8 disease is very small and limited to a single core, and with a negative PET/CT scan, he still has a fairly low risk for regional involvement.  I had a long discussion with both the patient and his spouse today regarding the overall management for prostate cancer and we particularly discussed both short course and long course treatments, and the differences between each.  As mentioned, he is not an ideal candidate for androgen deprivation therapy, so we will be treating using radiation therapy alone.  After full discussion of the different options, he was most interested in pursuing CyberKnife radiosurgery.  This will consist of 30 Louis in 5 fractions and we discussed the necessary prep including the use of gold seed fiducials and SpaceOAR hydrogel, and the ongoing use of alpha blockers, low gas diet, daily bowel prep, and Gas-X.  I am somewhat concerned regarding the fact he is reliant on In-N-Out cathing at the moment.  Typically when we deliver treatment and people are placing a catheter, there is more irritation in the prostate and as treatment progresses I anticipate this will become uncomfortable and I warned Mr. Dejesus that we may reach the point where we may need to anchor a Rubio catheter for potentially 1 week or more if his symptoms worsen.  We will obviously make adjustments to prevent and limit these side effects such as using lidocaine jelly, pain medications if needed as well as anti-inflammatories, and he acknowledged his understanding.  We also discussed the potential for lymph node recurrence at a later date which would otherwise require a more comprehensive course of treatment for salvage.  He accepts this risk.  He and his wife are about to go to a trip to  [place] for the month and I will tentatively plan to schedule his gold seed fiducials and SpaceOAR hydrogel for the end of February and we will tentatively aim to begin his treatments in the beginning of March.    RECOMMENDATIONS:    1.  Plan for CyberKnife radiosurgery to the prostate - 35 Gray in 5 fractions  2.  Gold seed fiducial and SpaceOAR hydrogel in 1 month  3.  Anticipate treatments will begin in early March    I spent a total of 60 minutes on todays visit, with more than 45 minutes in direct face to face communication, and the remainder of the time spent in reviewing the relevant history, records, available imaging, and for documentation.    Follow Up:   Return in about 1 month (around 2/25/2025) for Procedure.  Diagnoses and all orders for this visit:    1. Prostate cancer (Primary)    Thank you for allowing me to participate in the care of this individual.    Sincerely,       Jeronimo Schwartz MD

## 2025-01-27 ENCOUNTER — TELEPHONE (OUTPATIENT)
Dept: RADIATION ONCOLOGY | Facility: HOSPITAL | Age: 75
End: 2025-01-27
Payer: MEDICARE

## 2025-01-27 DIAGNOSIS — C61 PROSTATE CANCER: Primary | ICD-10-CM

## 2025-01-27 NOTE — TELEPHONE ENCOUNTER
Pt notified the following appts/map/diet mailed to his address:  2/28/25-@ 9:30-Pre admission Testing  3/7/25-Pre-op- 5:30 am arrival-NPO after midnight, enema prior to arrival-will need - hold Xarelto 2 days prir to procedure  3/10/25 @ 9:00-telehealth re-eval with Dr. Schwartz  Start gas-eliminating diet with gas-x 4 times per day starting 3/12/25  3/14/25 @ 11:00 sim/MRI- NPO for 6 hours prior to MRI (NPO after 5:00 am) enema prior to arrival.    Referral to Kim Phipps RD

## 2025-02-06 ENCOUNTER — OFFICE VISIT (OUTPATIENT)
Dept: UROLOGY | Facility: CLINIC | Age: 75
End: 2025-02-06
Payer: COMMERCIAL

## 2025-02-06 ENCOUNTER — TELEPHONE (OUTPATIENT)
Dept: UROLOGY | Facility: CLINIC | Age: 75
End: 2025-02-06
Payer: MEDICARE

## 2025-02-06 DIAGNOSIS — N32.81 OAB (OVERACTIVE BLADDER): ICD-10-CM

## 2025-02-06 DIAGNOSIS — R33.9 URINARY RETENTION: ICD-10-CM

## 2025-02-06 DIAGNOSIS — N30.00 ACUTE CYSTITIS WITHOUT HEMATURIA: Primary | ICD-10-CM

## 2025-02-06 DIAGNOSIS — R39.15 URINARY URGENCY: ICD-10-CM

## 2025-02-06 PROCEDURE — 87186 SC STD MICRODIL/AGAR DIL: CPT | Performed by: STUDENT IN AN ORGANIZED HEALTH CARE EDUCATION/TRAINING PROGRAM

## 2025-02-06 PROCEDURE — 87086 URINE CULTURE/COLONY COUNT: CPT | Performed by: STUDENT IN AN ORGANIZED HEALTH CARE EDUCATION/TRAINING PROGRAM

## 2025-02-06 PROCEDURE — 87077 CULTURE AEROBIC IDENTIFY: CPT | Performed by: STUDENT IN AN ORGANIZED HEALTH CARE EDUCATION/TRAINING PROGRAM

## 2025-02-06 RX ORDER — OXYBUTYNIN CHLORIDE 10 MG/1
10 TABLET, EXTENDED RELEASE ORAL DAILY
Qty: 30 TABLET | Refills: 2 | Status: SHIPPED | OUTPATIENT
Start: 2025-02-06

## 2025-02-06 RX ORDER — SULFAMETHOXAZOLE AND TRIMETHOPRIM 800; 160 MG/1; MG/1
1 TABLET ORAL 2 TIMES DAILY
Qty: 14 TABLET | Refills: 0 | Status: SHIPPED | OUTPATIENT
Start: 2025-02-06 | End: 2025-02-13

## 2025-02-06 RX ORDER — PHENAZOPYRIDINE HYDROCHLORIDE 200 MG/1
200 TABLET, FILM COATED ORAL 3 TIMES DAILY PRN
Qty: 20 TABLET | Refills: 0 | Status: SHIPPED | OUTPATIENT
Start: 2025-02-06

## 2025-02-06 RX ORDER — CEFTRIAXONE 1 G/1
1 INJECTION, POWDER, FOR SOLUTION INTRAMUSCULAR; INTRAVENOUS ONCE
Status: SHIPPED | OUTPATIENT
Start: 2025-02-06

## 2025-02-06 NOTE — TELEPHONE ENCOUNTER
Pt called with blood in urine after trying to self cath. He is coming to the [address] office for a nurse visit.

## 2025-02-06 NOTE — PROGRESS NOTES
Follow Up Office Visit      Patient Name: Vishal Dejesus  : 1950   MRN: [id number]     Chief Complaint:  No chief complaint on file.      Referring Provider: No ref. provider found    History of Present Illness: Vishal Dejesus is a 74 y.o. male who presents today for follow up of difficult catheterization.  Patient requested urgent visit today.  I added him to my schedule.  Last seen 1 month ago.  History of elevated PSA, history of chronic urinary tension, significant BPH, median lobe.  No benefit with Rapaflo and he initiate intermittent catheterization.  Underwent a prostate biopsy, prostate volume 91 g.  High-volume grade group 2+ a smattering of grade group 4 high risk prostate cancer was identified.  He has been set up with medical oncology and radiation oncology.  He will be undergoing SpaceOAR plus fiducial marker placement in preparation for CyberKnife radiation therapy in the coming weeks.  Patient catheterizes 3-5 times per day for ongoing retention concerns.  He typically gets 2 to 300 cc out each time he catheterizes.  He has been able to maintain natural urination but recently states he has not been able to urinate naturally and has been cath dependent.  Overnight patient catheterize 3-4 times due to constant sensation of needing to urinate.  He began having gross hematuria.    Intermittent-catheterization and bladder irrigation  Patient is placed supine on the exam table.  Urethra swabbed with iodine.  A 14 Kinyarwanda coudé catheter was advanced into the bladder without resistance.  Significant hematuria with no obvious clots is returned and sent for culture.  The patient's bladder was irrigated with 200 cc of sterile saline without clot return.  Urine has cleared to light pink after irrigation.    Vishal states he has had some low-grade temperatures, he believes his temperature iris to 99 or 100 Fahrenheit.  He has set up appointment with his primary care provider to assess for possible  causes.  We suspect may be urinary source.  I will send a culture today and he was given a 1 g intramuscular injection of ceftriaxone at the right gluteus.  We will extended antibiotic course with Bactrim for the next week.    Rocephin injection performed  1 g/vial  NDC [address]-4  Exp July 2027  LOT: [id number]    Subjective      Review of System: Review of Systems   Genitourinary:  Positive for difficulty urinating and hematuria.      I have reviewed the ROS documented by my clinical staff, I have updated appropriately and I agree. Adam Nguyen MD    I have reviewed and the following portions of the patient's history were updated as appropriate: past family history, past medical history, past social history, past surgical history and problem list.    Medications:     Current Outpatient Medications:     acetaminophen (TYLENOL) 500 MG tablet, Take 1 tablet by mouth Every 6 (Six) Hours As Needed for Mild Pain., Disp: , Rfl:     CARTIA  MG 24 hr capsule, Take 1 capsule by mouth Daily., Disp: , Rfl: 0    losartan (COZAAR) 50 MG tablet, Take 1 tablet by mouth Daily. for blood pressure, Disp: , Rfl:     metoprolol succinate XL (TOPROL-XL) 25 MG 24 hr tablet, Take 1 tablet by mouth Daily., Disp: , Rfl:     oxybutynin XL (Ditropan XL) 10 MG 24 hr tablet, Take 1 tablet by mouth Daily., Disp: 30 tablet, Rfl: 2    phenazopyridine (Pyridium) 200 MG tablet, Take 1 tablet by mouth 3 (Three) Times a Day As Needed for Bladder Spasms or Dysuria., Disp: 20 tablet, Rfl: 0    silodosin (RAPAFLO) 8 MG capsule capsule, Take 1 capsule by mouth Daily., Disp: , Rfl:     sulfamethoxazole-trimethoprim (Bactrim DS) 800-160 MG per tablet, Take 1 tablet by mouth 2 (Two) Times a Day for 7 days., Disp: 14 tablet, Rfl: 0    Xarelto 20 MG tablet, Take 1 tablet by mouth Daily., Disp: , Rfl:     Current Facility-Administered Medications:     cefTRIAXone (ROCEPHIN) injection 1 g, 1 g, Intramuscular, Once, Adam Nguyen  MD    Allergies:   Allergies   Allergen Reactions    Zosyn [Piperacillin-Tazobactam In Dex] Shortness Of Breath     Has tolerated Cefepime  Inpatient reaction 12/19: throat swelling and SOA req trx.          Objective     Physical Exam:   Vital Signs: There were no vitals filed for this visit.  There is no height or weight on file to calculate BMI.     Physical Exam  Constitutional:       Appearance: Normal appearance.   HENT:      Head: Normocephalic and atraumatic.      Nose: Nose normal.   Eyes:      Extraocular Movements: Extraocular movements intact.      Conjunctiva/sclera: Conjunctivae normal.      Pupils: Pupils are equal, round, and reactive to light.   Genitourinary:     Comments: Circumcised phallus, orthotopic meatus, bilateral descended testicles without masses or induration.  Musculoskeletal:         General: Normal range of motion.      Cervical back: Normal range of motion and neck supple.   Skin:     General: Skin is warm and dry.      Findings: No lesion or rash.   Neurological:      General: No focal deficit present.      Mental Status: He is alert and oriented to person, place, and time. Mental status is at baseline.   Psychiatric:         Mood and Affect: Mood normal.         Behavior: Behavior normal.         Labs:   Brief Urine Lab Results  (Last result in the past 365 days)        Color   Clarity   Blood   Leuk Est   Nitrite   Protein   CREAT   Urine HCG        12/19/24 1452 Orange   Cloudy   Large (3+)   Moderate (2+)   Positive   30 mg/dL (1+)                   Urine Culture          12/19/2024    14:52   Urine Culture   Urine Culture No growth         Lab Results   Component Value Date    GLUCOSE 147 (H) 12/21/2024    CALCIUM 8.5 (L) 12/21/2024     12/21/2024    K 4.5 12/21/2024    CO2 19.0 (L) 12/21/2024     (H) 12/21/2024    BUN 21 12/21/2024    CREATININE 0.81 12/21/2024    EGFRIFAFRI 70 10/04/2024    EGFRIFNONA 82 10/29/2019    BCR 25.9 (H) 12/21/2024    ANIONGAP 12.0  12/21/2024       Lab Results   Component Value Date    WBC 11.45 (H) 12/21/2024    HGB 14.7 12/21/2024    HCT 43.5 12/21/2024    MCV 92.2 12/21/2024     (L) 12/21/2024       Images:   NM PET/CT Skull Base to Mid Thigh    Result Date: 12/31/2024  Impression: 1.Focal uptake within the left posterior peripheral zone of the prostate suggestive of local disease. 2.No evidence of hyper avid metastatic disease. 3.Few scattered pulmonary micronodules measuring up to 3 mm are too small to characterize with PET. Recommend continued attention on follow-up. Electronically Signed: Joel Downey MD  12/31/2024 2:21 PM EST  Workstation ID: [id number]    CT Abdomen Pelvis With Contrast    Result Date: 12/19/2024  Impression: 1.Prostatomegaly. No prostatic abscess is identified. 2.Bladder wall thickening may be related to underdistention, increased trabeculation, or cystitis. Please correlate with urinalysis. 3. 2.6 cm hypodense structure seen about the pancreatic head, possibly an exophytic pancreatic cyst. Pancreatic protocol MRI may be useful to further characterize this finding. 4.Colonic diverticulosis. 5.Hepatic steatosis. 6.Additional findings as detailed above. Electronically Signed: Jeremy Mcneal MD  12/19/2024 12:01 PM EST  Workstation ID: [id number]    XR Chest 1 View    Result Date: 12/19/2024  Impression: No evidence of acute cardiopulmonary disease. Electronically Signed: Sid Parson MD  12/19/2024 10:16 AM EST  Workstation ID: [id number]    XR Femur 2 View Left    Result Date: 10/14/2024  1. No appreciable left femur or left thigh soft tissue mass is identified. Outside comparison MRI reported images are not available. Mild degenerative changes of the left hip. 3. Left knee replacement. Electronically Signed: Lashon Higuera MD  10/14/2024 1:36 PM EDT  Workstation ID: [id number]      Measures:   Tobacco:   Vihsal Dejesus  reports that he has never smoked. He has never used smokeless tobacco.    Assessment / Plan       Assessment/Plan:   74 y.o. male who presented today for follow up of gross hematuria, new onset low-grade temperature, concern for UTI.  History of prior sepsis related to UTI versus prostate biopsy related infection.  All cultures from recent admission negative which was fairly interesting.  I will extend a Bactrim course and he was given Rocephin today.  He has severe OAB and we will treat him with oxybutynin and Pyridium, risks discussed.  Continue follow-up with radiation oncology for radiation therapy planning.    Diagnoses and all orders for this visit:    1. Acute cystitis without hematuria (Primary)  -     sulfamethoxazole-trimethoprim (Bactrim DS) 800-160 MG per tablet; Take 1 tablet by mouth 2 (Two) Times a Day for 7 days.  Dispense: 14 tablet; Refill: 0  -     cefTRIAXone (ROCEPHIN) injection 1 g  -     Urine Culture - Urine, Urine, Clean Catch; Future  -     Urine Culture - Urine, Urine, Clean Catch    2. Urinary urgency  -     oxybutynin XL (Ditropan XL) 10 MG 24 hr tablet; Take 1 tablet by mouth Daily.  Dispense: 30 tablet; Refill: 2  -     phenazopyridine (Pyridium) 200 MG tablet; Take 1 tablet by mouth 3 (Three) Times a Day As Needed for Bladder Spasms or Dysuria.  Dispense: 20 tablet; Refill: 0  -     Urine Culture - Urine, Urine, Clean Catch; Future  -     Urine Culture - Urine, Urine, Clean Catch    3. OAB (overactive bladder)  -     oxybutynin XL (Ditropan XL) 10 MG 24 hr tablet; Take 1 tablet by mouth Daily.  Dispense: 30 tablet; Refill: 2  -     phenazopyridine (Pyridium) 200 MG tablet; Take 1 tablet by mouth 3 (Three) Times a Day As Needed for Bladder Spasms or Dysuria.  Dispense: 20 tablet; Refill: 0  -     Urine Culture - Urine, Urine, Clean Catch; Future  -     Urine Culture - Urine, Urine, Clean Catch    4. Urinary retention [R33.9]           Follow Up:   No follow-ups on file.    I spent approximately 35 minutes providing clinical care for this patient; including review of patient's  chart and provider documentation, face to face time spent with patient in examination room (obtaining history, performing physical exam, discussing diagnosis and management options), placing orders, and completing patient documentation.     Adam Nguyen MD  [facility] [place]

## 2025-02-06 NOTE — TELEPHONE ENCOUNTER
"PT self caths and is suddenly unable to urinate on his own. He states he feels like he is going to \"explode\" and has a strong urge to urinate. He is self cathing fine and able to get urinate output doing so. Pt is running a fever and will be going to PCP to get checked out. I asked if he could drop off a urine sample to ensure he does not have an infection. PT will drop off urine sample to [facility] office. Pt apprehensive to speak to me although I explained to him I can relay information to Dr. Nguyen/ [facility]. PT strongly urges to speak to you all. Please advise  "

## 2025-02-07 NOTE — PROGRESS NOTES
Please let Vishal know he has a urinary tract infection and to continue the Bactrim as prescribed.  I will follow-up speciation and susceptibility and notify him if need for antibiotic change.  Thank you

## 2025-02-08 LAB — BACTERIA SPEC AEROBE CULT: ABNORMAL

## 2025-02-08 NOTE — PROGRESS NOTES
Patient has pan susceptible E. coli noted on UTI, he was prescribed Bactrim which appears to be appropriate treatment.

## 2025-02-28 ENCOUNTER — PRE-ADMISSION TESTING (OUTPATIENT)
Dept: PREADMISSION TESTING | Facility: HOSPITAL | Age: 75
End: 2025-02-28
Payer: MEDICARE

## 2025-02-28 VITALS — WEIGHT: 275.35 LBS | HEIGHT: 72 IN | BODY MASS INDEX: 37.3 KG/M2

## 2025-02-28 LAB
DEPRECATED RDW RBC AUTO: 47.2 FL (ref 37–54)
ERYTHROCYTE [DISTWIDTH] IN BLOOD BY AUTOMATED COUNT: 14.1 % (ref 12.3–15.4)
HCT VFR BLD AUTO: 46.3 % (ref 37.5–51)
HGB BLD-MCNC: 15.6 G/DL (ref 13–17.7)
INR PPP: 2.27 (ref 0.89–1.12)
MCH RBC QN AUTO: 31 PG (ref 26.6–33)
MCHC RBC AUTO-ENTMCNC: 33.7 G/DL (ref 31.5–35.7)
MCV RBC AUTO: 91.9 FL (ref 79–97)
PLATELET # BLD AUTO: 165 10*3/MM3 (ref 140–450)
PMV BLD AUTO: 10.3 FL (ref 6–12)
POTASSIUM SERPL-SCNC: 4.3 MMOL/L (ref 3.5–5.2)
PROTHROMBIN TIME: 25.1 SECONDS (ref 12.2–14.5)
QT INTERVAL: 430 MS
QTC INTERVAL: 414 MS
RBC # BLD AUTO: 5.04 10*6/MM3 (ref 4.14–5.8)
WBC NRBC COR # BLD AUTO: 5.71 10*3/MM3 (ref 3.4–10.8)

## 2025-02-28 PROCEDURE — 85610 PROTHROMBIN TIME: CPT

## 2025-02-28 PROCEDURE — 36415 COLL VENOUS BLD VENIPUNCTURE: CPT

## 2025-02-28 PROCEDURE — 85027 COMPLETE CBC AUTOMATED: CPT

## 2025-02-28 PROCEDURE — 84132 ASSAY OF SERUM POTASSIUM: CPT

## 2025-02-28 PROCEDURE — 93010 ELECTROCARDIOGRAM REPORT: CPT | Performed by: STUDENT IN AN ORGANIZED HEALTH CARE EDUCATION/TRAINING PROGRAM

## 2025-02-28 PROCEDURE — 93005 ELECTROCARDIOGRAM TRACING: CPT

## 2025-02-28 NOTE — PAT
An arrival time for procedure was not provided during PAT visit. If patient had any questions or concerns about their arrival time, they were instructed to contact their surgeon/physician.  Additionally, if the patient referred to an arrival time that was acquired from their my chart account, patient was encouraged to verify that time with their surgeon/physician. Arrival times are NOT provided in Pre Admission Testing Department.    Per Anesthesia Request, patient instructed not to take their ACE/ARB medications on the AM of surgery.    Patient denies any current skin issues.     EKG consistent with known hx of a-fib.  Pt is on Xarelto.  Will hold per physician instructions.

## 2025-03-06 ENCOUNTER — DOCUMENTATION (OUTPATIENT)
Dept: NUTRITION | Facility: HOSPITAL | Age: 75
End: 2025-03-06
Payer: MEDICARE

## 2025-03-06 NOTE — PROGRESS NOTES
ONC NUTRITION    Attempted to reach patient via phone to review the Gas Elimination Diet and instructions in preparation for the imaging scans and CK treatment for prostate cancer.  No answer; VM left requesting return phone call.    Patient returned phone call stating that he had reviewed the packet of information, understanding the diet and instructions.  He stated that it was not necessary for phone call to be returned.

## 2025-03-07 ENCOUNTER — HOSPITAL ENCOUNTER (OUTPATIENT)
Facility: HOSPITAL | Age: 75
Setting detail: HOSPITAL OUTPATIENT SURGERY
Discharge: HOME | End: 2025-03-07
Attending: RADIOLOGY | Admitting: RADIOLOGY
Payer: COMMERCIAL

## 2025-03-07 ENCOUNTER — ANESTHESIA (OUTPATIENT)
Dept: PERIOP | Facility: HOSPITAL | Age: 75
End: 2025-03-07
Payer: COMMERCIAL

## 2025-03-07 ENCOUNTER — HOSPITAL ENCOUNTER (OUTPATIENT)
Dept: RADIATION ONCOLOGY | Facility: HOSPITAL | Age: 75
Setting detail: RADIATION/ONCOLOGY SERIES
Discharge: HOME | End: 2025-03-07
Payer: COMMERCIAL

## 2025-03-07 ENCOUNTER — ANESTHESIA EVENT (OUTPATIENT)
Dept: PERIOP | Facility: HOSPITAL | Age: 75
End: 2025-03-07
Payer: COMMERCIAL

## 2025-03-07 VITALS
TEMPERATURE: 98.2 F | BODY MASS INDEX: 37.25 KG/M2 | HEIGHT: 72 IN | WEIGHT: 275 LBS | RESPIRATION RATE: 16 BRPM | SYSTOLIC BLOOD PRESSURE: 130 MMHG | OXYGEN SATURATION: 95 % | DIASTOLIC BLOOD PRESSURE: 84 MMHG | HEART RATE: 80 BPM

## 2025-03-07 LAB
INR PPP: 1.04 (ref 0.89–1.12)
PROTHROMBIN TIME: 13.7 SECONDS (ref 12.2–14.5)

## 2025-03-07 PROCEDURE — A4648 IMPLANTABLE TISSUE MARKER: HCPCS | Performed by: RADIOLOGY

## 2025-03-07 PROCEDURE — 25810000003 LACTATED RINGERS PER 1000 ML: Performed by: ANESTHESIOLOGY

## 2025-03-07 PROCEDURE — 25010000002 PROPOFOL 10 MG/ML EMULSION: Performed by: NURSE ANESTHETIST, CERTIFIED REGISTERED

## 2025-03-07 PROCEDURE — 25010000002 LIDOCAINE PF 1% 1 % SOLUTION: Performed by: NURSE ANESTHETIST, CERTIFIED REGISTERED

## 2025-03-07 PROCEDURE — 76965 ECHO GUIDANCE RADIOTHERAPY: CPT | Performed by: RADIOLOGY

## 2025-03-07 PROCEDURE — 85610 PROTHROMBIN TIME: CPT | Performed by: ANESTHESIOLOGY

## 2025-03-07 PROCEDURE — 25010000002 ONDANSETRON PER 1 MG: Performed by: NURSE ANESTHETIST, CERTIFIED REGISTERED

## 2025-03-07 PROCEDURE — 25010000002 DEXAMETHASONE PER 1 MG: Performed by: NURSE ANESTHETIST, CERTIFIED REGISTERED

## 2025-03-07 PROCEDURE — 25010000002 LIDOCAINE-EPINEPHRINE (PF) 2 %-1:200000 SOLUTION: Performed by: RADIOLOGY

## 2025-03-07 PROCEDURE — C1889 IMPLANT/INSERT DEVICE, NOC: HCPCS | Performed by: RADIOLOGY

## 2025-03-07 PROCEDURE — 25010000002 FENTANYL CITRATE (PF) 100 MCG/2ML SOLUTION: Performed by: NURSE ANESTHETIST, CERTIFIED REGISTERED

## 2025-03-07 PROCEDURE — 25810000003 SODIUM CHLORIDE PER 500 ML: Performed by: RADIOLOGY

## 2025-03-07 PROCEDURE — 25010000002 LIDOCAINE PF 1% 1 % SOLUTION: Performed by: ANESTHESIOLOGY

## 2025-03-07 PROCEDURE — 25010000002 CEFAZOLIN 3 G RECONSTITUTED SOLUTION 1 EACH VIAL

## 2025-03-07 DEVICE — STERILE PLACEMENT NEEDLES (18GA ETW X 20CM) WITH BONE WAX AND (0.9 X 3MM) SOFT TISSUE GOLD MARKER [3]
Type: IMPLANTABLE DEVICE | Site: PERIANAL | Status: FUNCTIONAL
Brand: FIDUCIAL MARKER KIT

## 2025-03-07 DEVICE — SPACEOAR SYSTEMS
Type: IMPLANTABLE DEVICE | Site: PERIANAL | Status: FUNCTIONAL
Brand: SPACEOAR VUE™ SYSTEM - 10ML

## 2025-03-07 RX ORDER — FAMOTIDINE 20 MG/1
20 TABLET, FILM COATED ORAL ONCE
Status: DISCONTINUED | OUTPATIENT
Start: 2025-03-07 | End: 2025-03-07 | Stop reason: SDUPTHER

## 2025-03-07 RX ORDER — LIDOCAINE HYDROCHLORIDE 10 MG/ML
INJECTION, SOLUTION EPIDURAL; INFILTRATION; INTRACAUDAL; PERINEURAL AS NEEDED
Status: DISCONTINUED | OUTPATIENT
Start: 2025-03-07 | End: 2025-03-07 | Stop reason: SURG

## 2025-03-07 RX ORDER — PROPOFOL 10 MG/ML
VIAL (ML) INTRAVENOUS AS NEEDED
Status: DISCONTINUED | OUTPATIENT
Start: 2025-03-07 | End: 2025-03-07 | Stop reason: SURG

## 2025-03-07 RX ORDER — HYDROMORPHONE HYDROCHLORIDE 1 MG/ML
0.5 INJECTION, SOLUTION INTRAMUSCULAR; INTRAVENOUS; SUBCUTANEOUS
Status: DISCONTINUED | OUTPATIENT
Start: 2025-03-07 | End: 2025-03-07 | Stop reason: HOSPADM

## 2025-03-07 RX ORDER — FENTANYL CITRATE 50 UG/ML
50 INJECTION, SOLUTION INTRAMUSCULAR; INTRAVENOUS
Status: DISCONTINUED | OUTPATIENT
Start: 2025-03-07 | End: 2025-03-07 | Stop reason: HOSPADM

## 2025-03-07 RX ORDER — LABETALOL HYDROCHLORIDE 5 MG/ML
5 INJECTION, SOLUTION INTRAVENOUS
Status: DISCONTINUED | OUTPATIENT
Start: 2025-03-07 | End: 2025-03-07 | Stop reason: HOSPADM

## 2025-03-07 RX ORDER — PROMETHAZINE HYDROCHLORIDE 25 MG/1
25 TABLET ORAL ONCE AS NEEDED
Status: DISCONTINUED | OUTPATIENT
Start: 2025-03-07 | End: 2025-03-07 | Stop reason: HOSPADM

## 2025-03-07 RX ORDER — MIDAZOLAM HYDROCHLORIDE 1 MG/ML
0.5 INJECTION, SOLUTION INTRAMUSCULAR; INTRAVENOUS
Status: DISCONTINUED | OUTPATIENT
Start: 2025-03-07 | End: 2025-03-07 | Stop reason: HOSPADM

## 2025-03-07 RX ORDER — SODIUM CHLORIDE 0.9 % (FLUSH) 0.9 %
3 SYRINGE (ML) INJECTION EVERY 12 HOURS SCHEDULED
Status: DISCONTINUED | OUTPATIENT
Start: 2025-03-07 | End: 2025-03-07 | Stop reason: HOSPADM

## 2025-03-07 RX ORDER — FENTANYL CITRATE 50 UG/ML
INJECTION, SOLUTION INTRAMUSCULAR; INTRAVENOUS AS NEEDED
Status: DISCONTINUED | OUTPATIENT
Start: 2025-03-07 | End: 2025-03-07 | Stop reason: SURG

## 2025-03-07 RX ORDER — HYDROCODONE BITARTRATE AND ACETAMINOPHEN 5; 325 MG/1; MG/1
1 TABLET ORAL ONCE AS NEEDED
Status: DISCONTINUED | OUTPATIENT
Start: 2025-03-07 | End: 2025-03-07 | Stop reason: HOSPADM

## 2025-03-07 RX ORDER — LIDOCAINE HYDROCHLORIDE 10 MG/ML
0.5 INJECTION, SOLUTION EPIDURAL; INFILTRATION; INTRACAUDAL; PERINEURAL ONCE AS NEEDED
Status: DISCONTINUED | OUTPATIENT
Start: 2025-03-07 | End: 2025-03-07 | Stop reason: SDUPTHER

## 2025-03-07 RX ORDER — SODIUM CHLORIDE 9 MG/ML
9 INJECTION, SOLUTION INTRAVENOUS AS NEEDED
Status: DISCONTINUED | OUTPATIENT
Start: 2025-03-07 | End: 2025-03-07 | Stop reason: HOSPADM

## 2025-03-07 RX ORDER — SODIUM CHLORIDE, SODIUM LACTATE, POTASSIUM CHLORIDE, CALCIUM CHLORIDE 600; 310; 30; 20 MG/100ML; MG/100ML; MG/100ML; MG/100ML
9 INJECTION, SOLUTION INTRAVENOUS ONCE
Status: COMPLETED | OUTPATIENT
Start: 2025-03-07 | End: 2025-03-07

## 2025-03-07 RX ORDER — SODIUM CHLORIDE 0.9 % (FLUSH) 0.9 %
10 SYRINGE (ML) INJECTION EVERY 12 HOURS SCHEDULED
Status: DISCONTINUED | OUTPATIENT
Start: 2025-03-07 | End: 2025-03-07 | Stop reason: HOSPADM

## 2025-03-07 RX ORDER — FAMOTIDINE 20 MG/1
20 TABLET, FILM COATED ORAL
Status: COMPLETED | OUTPATIENT
Start: 2025-03-07 | End: 2025-03-07

## 2025-03-07 RX ORDER — SODIUM CHLORIDE, SODIUM LACTATE, POTASSIUM CHLORIDE, CALCIUM CHLORIDE 600; 310; 30; 20 MG/100ML; MG/100ML; MG/100ML; MG/100ML
9 INJECTION, SOLUTION INTRAVENOUS CONTINUOUS
Status: DISCONTINUED | OUTPATIENT
Start: 2025-03-08 | End: 2025-03-07 | Stop reason: HOSPADM

## 2025-03-07 RX ORDER — PROMETHAZINE HYDROCHLORIDE 25 MG/1
25 SUPPOSITORY RECTAL ONCE AS NEEDED
Status: DISCONTINUED | OUTPATIENT
Start: 2025-03-07 | End: 2025-03-07 | Stop reason: HOSPADM

## 2025-03-07 RX ORDER — SODIUM CHLORIDE 0.9 % (FLUSH) 0.9 %
3-10 SYRINGE (ML) INJECTION AS NEEDED
Status: DISCONTINUED | OUTPATIENT
Start: 2025-03-07 | End: 2025-03-07 | Stop reason: HOSPADM

## 2025-03-07 RX ORDER — SODIUM CHLORIDE 9 MG/ML
INJECTION, SOLUTION INTRAVENOUS AS NEEDED
Status: DISCONTINUED | OUTPATIENT
Start: 2025-03-07 | End: 2025-03-07 | Stop reason: HOSPADM

## 2025-03-07 RX ORDER — DEXAMETHASONE SODIUM PHOSPHATE 4 MG/ML
INJECTION, SOLUTION INTRA-ARTICULAR; INTRALESIONAL; INTRAMUSCULAR; INTRAVENOUS; SOFT TISSUE AS NEEDED
Status: DISCONTINUED | OUTPATIENT
Start: 2025-03-07 | End: 2025-03-07 | Stop reason: SURG

## 2025-03-07 RX ORDER — METOPROLOL SUCCINATE 25 MG/1
25 TABLET, EXTENDED RELEASE ORAL ONCE
Status: COMPLETED | OUTPATIENT
Start: 2025-03-07 | End: 2025-03-07

## 2025-03-07 RX ORDER — SODIUM CHLORIDE, SODIUM LACTATE, POTASSIUM CHLORIDE, CALCIUM CHLORIDE 600; 310; 30; 20 MG/100ML; MG/100ML; MG/100ML; MG/100ML
9 INJECTION, SOLUTION INTRAVENOUS CONTINUOUS
Status: DISCONTINUED | OUTPATIENT
Start: 2025-03-07 | End: 2025-03-07 | Stop reason: HOSPADM

## 2025-03-07 RX ORDER — SODIUM CHLORIDE 0.9 % (FLUSH) 0.9 %
10 SYRINGE (ML) INJECTION AS NEEDED
Status: DISCONTINUED | OUTPATIENT
Start: 2025-03-07 | End: 2025-03-07 | Stop reason: HOSPADM

## 2025-03-07 RX ORDER — HYDRALAZINE HYDROCHLORIDE 20 MG/ML
5 INJECTION INTRAMUSCULAR; INTRAVENOUS
Status: DISCONTINUED | OUTPATIENT
Start: 2025-03-07 | End: 2025-03-07 | Stop reason: HOSPADM

## 2025-03-07 RX ORDER — LIDOCAINE HYDROCHLORIDE 10 MG/ML
0.5 INJECTION, SOLUTION EPIDURAL; INFILTRATION; INTRACAUDAL; PERINEURAL ONCE AS NEEDED
Status: COMPLETED | OUTPATIENT
Start: 2025-03-07 | End: 2025-03-07

## 2025-03-07 RX ORDER — LIDOCAINE HCL/EPINEPHRINE/PF 2%-1:200K
VIAL (ML) INJECTION AS NEEDED
Status: DISCONTINUED | OUTPATIENT
Start: 2025-03-07 | End: 2025-03-07 | Stop reason: HOSPADM

## 2025-03-07 RX ORDER — FAMOTIDINE 10 MG/ML
20 INJECTION, SOLUTION INTRAVENOUS ONCE
Status: DISCONTINUED | OUTPATIENT
Start: 2025-03-07 | End: 2025-03-07

## 2025-03-07 RX ORDER — EPHEDRINE SULFATE 50 MG/ML
INJECTION INTRAVENOUS AS NEEDED
Status: DISCONTINUED | OUTPATIENT
Start: 2025-03-07 | End: 2025-03-07 | Stop reason: SURG

## 2025-03-07 RX ORDER — OXYCODONE AND ACETAMINOPHEN 7.5; 325 MG/1; MG/1
1 TABLET ORAL EVERY 4 HOURS PRN
Status: DISCONTINUED | OUTPATIENT
Start: 2025-03-07 | End: 2025-03-07 | Stop reason: HOSPADM

## 2025-03-07 RX ORDER — IPRATROPIUM BROMIDE AND ALBUTEROL SULFATE 2.5; .5 MG/3ML; MG/3ML
3 SOLUTION RESPIRATORY (INHALATION) ONCE AS NEEDED
Status: DISCONTINUED | OUTPATIENT
Start: 2025-03-07 | End: 2025-03-07 | Stop reason: HOSPADM

## 2025-03-07 RX ORDER — ONDANSETRON 2 MG/ML
INJECTION INTRAMUSCULAR; INTRAVENOUS AS NEEDED
Status: DISCONTINUED | OUTPATIENT
Start: 2025-03-07 | End: 2025-03-07 | Stop reason: SURG

## 2025-03-07 RX ORDER — ONDANSETRON 2 MG/ML
4 INJECTION INTRAMUSCULAR; INTRAVENOUS ONCE AS NEEDED
Status: DISCONTINUED | OUTPATIENT
Start: 2025-03-07 | End: 2025-03-07 | Stop reason: HOSPADM

## 2025-03-07 RX ORDER — NALOXONE HCL 0.4 MG/ML
0.4 VIAL (ML) INJECTION AS NEEDED
Status: DISCONTINUED | OUTPATIENT
Start: 2025-03-07 | End: 2025-03-07 | Stop reason: HOSPADM

## 2025-03-07 RX ADMIN — SODIUM CHLORIDE, POTASSIUM CHLORIDE, SODIUM LACTATE AND CALCIUM CHLORIDE: 600; 310; 30; 20 INJECTION, SOLUTION INTRAVENOUS at 07:32

## 2025-03-07 RX ADMIN — FENTANYL CITRATE 50 MCG: 50 INJECTION, SOLUTION INTRAMUSCULAR; INTRAVENOUS at 07:40

## 2025-03-07 RX ADMIN — PROPOFOL 200 MG: 10 INJECTION, EMULSION INTRAVENOUS at 07:40

## 2025-03-07 RX ADMIN — SODIUM CHLORIDE 3000 MG: 900 INJECTION INTRAVENOUS at 07:32

## 2025-03-07 RX ADMIN — DEXAMETHASONE SODIUM PHOSPHATE 8 MG: 4 INJECTION INTRA-ARTICULAR; INTRALESIONAL; INTRAMUSCULAR; INTRAVENOUS; SOFT TISSUE at 07:44

## 2025-03-07 RX ADMIN — EPHEDRINE SULFATE 10 MG: 50 INJECTION INTRAVENOUS at 07:59

## 2025-03-07 RX ADMIN — LIDOCAINE HYDROCHLORIDE 0.5 ML: 10 INJECTION, SOLUTION EPIDURAL; INFILTRATION; INTRACAUDAL; PERINEURAL at 06:24

## 2025-03-07 RX ADMIN — FENTANYL CITRATE 50 MCG: 50 INJECTION, SOLUTION INTRAMUSCULAR; INTRAVENOUS at 07:45

## 2025-03-07 RX ADMIN — PROPOFOL 30 MG: 10 INJECTION, EMULSION INTRAVENOUS at 08:02

## 2025-03-07 RX ADMIN — LIDOCAINE HYDROCHLORIDE 50 MG: 10 INJECTION, SOLUTION EPIDURAL; INFILTRATION; INTRACAUDAL; PERINEURAL at 07:40

## 2025-03-07 RX ADMIN — SODIUM CHLORIDE, POTASSIUM CHLORIDE, SODIUM LACTATE AND CALCIUM CHLORIDE 9 ML/HR: 600; 310; 30; 20 INJECTION, SOLUTION INTRAVENOUS at 06:24

## 2025-03-07 RX ADMIN — METOPROLOL SUCCINATE 25 MG: 25 TABLET, EXTENDED RELEASE ORAL at 07:11

## 2025-03-07 RX ADMIN — FAMOTIDINE 20 MG: 20 TABLET, FILM COATED ORAL at 06:34

## 2025-03-07 RX ADMIN — ONDANSETRON 4 MG: 2 INJECTION INTRAMUSCULAR; INTRAVENOUS at 08:00

## 2025-03-07 NOTE — ANESTHESIA PROCEDURE NOTES
Airway  Urgency: elective    Date/Time: 3/7/2025 7:41 AM    General Information and Staff    Patient location during procedure: OR  CRNA/CAA: Jne Sarmiento CRNA    Indications and Patient Condition  Indications for airway management: airway protection    Preoxygenated: yes  Mask difficulty assessment: 0 - not attempted    Final Airway Details  Final airway type: supraglottic airway      Successful airway: I-gel  Size 5     Number of attempts at approach: 1  Assessment: lips, teeth, and gum same as pre-op

## 2025-03-07 NOTE — ANESTHESIA PREPROCEDURE EVALUATION
Anesthesia Evaluation     Patient summary reviewed and Nursing notes reviewed   no history of anesthetic complications:   NPO Solid Status: > 8 hours  NPO Liquid Status: > 8 hours           Airway   Mallampati: II  TM distance: >3 FB  Neck ROM: full  No difficulty expected  Dental      Pulmonary - negative pulmonary ROS and normal exam   Cardiovascular - normal exam    (+) hypertension, CAD, dysrhythmias, hyperlipidemia      Neuro/Psych- negative ROS  GI/Hepatic/Renal/Endo    (+) obesity, morbid obesity, liver disease, renal disease-    Musculoskeletal     Abdominal    Substance History      OB/GYN          Other   arthritis,   history of cancer                Anesthesia Plan    ASA 3     general     intravenous induction     Anesthetic plan, risks, benefits, and alternatives have been provided, discussed and informed consent has been obtained with: patient.    Plan discussed with CRNA.    CODE STATUS:

## 2025-03-07 NOTE — ANESTHESIA POSTPROCEDURE EVALUATION
Patient: Vishal Dejesus    Procedure Summary       Date: 03/07/25 Room / Location:  [facility]    Anesthesia Start: 0732 Anesthesia Stop: 0821    Procedures:       PERIRECTAL SPACER APPLICATION FOR RADIATION TREATMENT      TRANSPERINEAL GOLD FIDUCIAL MARKER PLACEMENT (Rectum) Diagnosis:     Surgeons: Jeronimo Schwartz MD Provider: Chadwick Hylton MD    Anesthesia Type: general ASA Status: 3            Anesthesia Type: general    Vitals  Vitals Value Taken Time   /82 03/07/25 0822   Temp     Pulse 100 03/07/25 0822   Resp 16 03/07/25 0822   SpO2 94 % 03/07/25 0822           Post Anesthesia Care and Evaluation    Patient location during evaluation: PACU  Patient participation: waiting for patient participation  Level of consciousness: sleepy but conscious    Airway patency: patent  Anesthetic complications: No anesthetic complications  PONV Status: none  Cardiovascular status: blood pressure returned to baseline  Respiratory status: nasal cannula and spontaneous ventilation  Hydration status: acceptable  No anesthesia care post op

## 2025-03-07 NOTE — OP NOTE
PROCEDURE BEING PERFORMED: Transperineal Placement of Gold Seed Fiducials and Hydrogel Spacer     PREOPERATIVE DIAGNOSIS: Prostate Cancer     POSTOPERATIVE DIAGNOSIS: Prostate Cancer     ATTENDING SURGEON: Jeronimo Schwartz MD     TYPE OF ANESTHESIA: MAC plus local     INDICATIONS FOR PROCEDURE:   Mr. Dejesus is a 74 year old gentleman with a new diagnosis of prostate cancer.  He will be receiving treatment using SBRT on our Cyberknife unit.  He presents today for placement of gold seed fiducials for motion management during treatment, and also for placement of SpaceOAR to help limit potential side effects.     FINDINGS:  Enlarged prostate     DESCRIPTION OF PROCEDURE:   Mr. Dejesus presented today as an outpatient.  After he was evaluated by both my team and the Anesthesia service, he was taken to the OR.  A timeout was performed, and he was placed on the operating table in the supine position.  Sedation was initiated, and he was then repositioned in the dorsal lithotomy position.  He was prepped in a sterile fashion.  A brachytherapy stepper was mounted to the table, and a transrectal ultrasound probe was inserted into the rectum and baseline pictures of the prostate were obtained in the axial and sagittal planes.  Once the most ideal positioning was obtained, I injected a total of 10 cc of 1% Lidocaine into the perineal body for additional analgesia.  Under direct ultrasound guidance, I then guided 5 separate 17G brachytherapy needles into the prostate, with directed positions in the right and left apex, mid, and base.  A total of 5 gold seed fiducials were placed.  Care was taken to avoid the urethra and the neurovascular bundles. I then transitioned to the hydrogel portions of the case.  I placed a single 22G spinal need into the prerectal space under direct ultrasound guidance.  Using a combination of both axial and sagittal images, I performed a hydrodisection with sterile saline and once we were able to  verify that the needle tip and injection was contained within the appropriate space and there was no evidence of extrusion into the rectal wall, the SpaceOAR kit was then attached to the needle and injected under ultrasound guidance over 20 seconds.  At the completion of the injection, additional images were obtained which confirmed correct placement.  The ultrasound probe was then removed and that concluded the procedural portions of the case. There was no blood on the rectal probe. He was awakened in the operating room and completed phase 1 recovery before being transferred to the PACU.  He will be discharged to home.     DISPOSITION: Discharged to home.      COMPLICATIONS: None     BLOOD LOSS: <1 cc.

## 2025-03-07 NOTE — H&P
Pre-Op H&P  Vishal Dejesus  [id number]  1950    Chief complaint: prostate cancer    HPI:    Patient is a 74 y.o.male who presents with a history of prostate cancer. He presents to the operating room today for surgical management with a perirectal spacer application for radiation treatment, transperineal gold fiducial marker placement.    Review of Systems:  General ROS: negative for chills, fever or skin lesions;  No changes since last office visit.  Neg for recent sick exposure  Cardiovascular ROS: no chest pain or dyspnea on exertion  Respiratory ROS: no cough, shortness of breath, or wheezing    Allergies:   Allergies   Allergen Reactions    Zosyn [Piperacillin-Tazobactam In Dex] Shortness Of Breath     Has tolerated Cefepime  Inpatient reaction 12/19: throat swelling and SOA req trx.       Home Meds:    No current facility-administered medications on file prior to encounter.     Current Outpatient Medications on File Prior to Encounter   Medication Sig Dispense Refill    CARTIA  MG 24 hr capsule Take 1 capsule by mouth Daily.  0    losartan (COZAAR) 50 MG tablet Take 1 tablet by mouth Daily. for blood pressure      metoprolol succinate XL (TOPROL-XL) 25 MG 24 hr tablet Take 1 tablet by mouth Daily.      silodosin (RAPAFLO) 8 MG capsule capsule Take 1 capsule by mouth Daily.      Xarelto 20 MG tablet Take 1 tablet by mouth Daily.         PMH:   Past Medical History:   Diagnosis Date    A-fib     Arthritis     Coronary artery disease     Elevated PSA 11-21-24    See MRI    Hyperlipidemia     Hypertension     Obesity     Prostate cancer      PSH:    Past Surgical History:   Procedure Laterality Date    APPENDECTOMY  1986    CARDIAC ABLATION  2013    KNEE SURGERY Left     VASECTOMY  2001    No       Immunization History:  Influenza: 2024  Pneumococcal: denies  Tetanus: patient unsure of date     Social History:   Tobacco:   Social History     Tobacco Use   Smoking Status Never   Smokeless Tobacco  "Never      Alcohol:     Social History     Substance and Sexual Activity   Alcohol Use Yes    Alcohol/week: 10.0 standard drinks of alcohol    Types: 10 Drinks containing 0.5 oz of alcohol per week    Comment: Sure is good       Vitals:           /100 (BP Location: Right arm, Patient Position: Sitting)   Pulse 81   Temp 98.1 °F (36.7 °C) (Temporal)   Resp 18   Ht 182.9 cm (72\")   Wt 125 kg (275 lb)   SpO2 97%   BMI 37.30 kg/m²     Physical Exam:  General Appearance:    Alert, cooperative, no distress, appears stated age   Head:    Normocephalic, without obvious abnormality, atraumatic   Lungs:     Clear to auscultation bilaterally, respirations unlabored    Heart:   Regular rate and rhythm, S1 and S2 normal, no murmur, rub    or gallop    Abdomen:    Soft, nontender.  +bowel sounds   Breast Exam:    deferred   Genitalia:    deferred   Extremities:   Extremities normal, atraumatic, no cyanosis or edema   Skin:   Skin color, texture, turgor normal, no rashes or lesions   Neurologic:   Grossly intact   Results Review  LABS:  Lab Results   Component Value Date    WBC 5.71 02/28/2025    HGB 15.6 02/28/2025    HCT 46.3 02/28/2025    MCV 91.9 02/28/2025     02/28/2025    NEUTROABS 12.40 (H) 12/20/2024    GLUCOSE 147 (H) 12/21/2024    BUN 21 12/21/2024    CREATININE 0.81 12/21/2024    EGFRIFNONA 82 10/29/2019    EGFRIFAFRI 70 10/04/2024     12/21/2024    K 4.3 02/28/2025     (H) 12/21/2024    CO2 19.0 (L) 12/21/2024    CALCIUM 8.5 (L) 12/21/2024    ALBUMIN 3.8 12/19/2024    AST 29 12/19/2024    ALT 37 12/19/2024    BILITOT 1.2 12/19/2024    INR 1.04 03/07/2025       RADIOLOGY:  No radiology results for the last 3 days     Cancer Staging (if applicable)   Cancer Staging   Stage IIC (cT1c, cN0, cM0, PSA: 7.3, Grade Group: 4)    Impression: prostate cancer    Plan: perirectal spacer application for radiation treatment, transperineal gold fiducial marker placement      Jaime Rangel PA-C "   03/07/25   6:56 AM EST     I saw and evaluated Mr. Dejesus this morning.  I agree with the assessment and plan as detailed above.  He is appropriate to proceed to the OR as scheduled.

## 2025-03-10 ENCOUNTER — CLINICAL SUPPORT (OUTPATIENT)
Dept: RADIATION ONCOLOGY | Facility: HOSPITAL | Age: 75
End: 2025-03-10
Payer: COMMERCIAL

## 2025-03-10 DIAGNOSIS — C61 PROSTATE CANCER: Primary | ICD-10-CM

## 2025-03-10 RX ORDER — DIAZEPAM 5 MG/1
5 TABLET ORAL 2 TIMES DAILY PRN
Qty: 5 TABLET | Refills: 0 | Status: SHIPPED | OUTPATIENT
Start: 2025-03-10

## 2025-03-10 RX ORDER — TAMSULOSIN HYDROCHLORIDE 0.4 MG/1
1 CAPSULE ORAL DAILY
Qty: 30 CAPSULE | Refills: 2 | Status: CANCELLED | OUTPATIENT
Start: 2025-03-10

## 2025-03-10 NOTE — PROGRESS NOTES
TELEMEDICINE FOLLOW-UP NOTE    03/10/2025    Problem List Items Addressed This Visit          Other    Prostate cancer - Primary    Relevant Medications    diazePAM (VALIUM) 5 MG tablet    Cancer Staging   Stage IIC (cT1c, cN0, cM0, PSA: 7.3, Grade Group: 4)      Time Devoted to Visit: 10 min including time to review his previous imaging and records, with 50% devoted to direct discussion.    Reason for Visit:  I personally contacted Vishal Dejesus  today in an effort to perform their scheduled follow-up visit remotely.    SUBJECTIVE:  With respect to their cancer diagnosis, Mr. Dejesus is a 74-year-old gentleman with a limited volume, [place] 4+4 = 8 prostate cancer.  We last met a little over 1 month ago when we discussed several different treatment options.  He is small volume [place] 8 disease, and predominantly lower grade, [place] 3+3 = 6 and 3+4 = 7 disease.  He has declined androgen deprivation therapy and would like to pursue a course of stereotactic body radiation therapy alone.  He underwent gold seed fiducial and SpaceOAR hydrogel injection on Friday of last week, which she states he tolerated very well.  He denies any new symptoms of pain, discomfort, urinary or bowels symptoms.  He continues to perform intermittent self catheterizations and he is on Rapaflo.  I contacted him today specifically to discuss radiation treatment planning as we move towards treating him with CyberKnife radiosurgery.            EXAM FINDINGS AND/OR PROBLEMS:  No new exam findings or problem    ASSESSMENT/PLAN: Vishal Dejesus is a 74 y.o. year old male with a high-grade prostate cancer that we plan to treat with CyberKnife radiosurgery alone.  He is scheduled to complete radiation treatment planning later this week and I anticipate treating him to a dose of 35 Gray over 5 fractions.  We discussed the necessary bowel prep, low gas diet, use of Gas-X as well as alpha blockers during treatment.  Considering he  is already on Rapaflo and is performing intermittent self catheterizations, we discussed taking his Rapaflo twice daily during treatment as well as the timing of his catheterizations and the potential that we may need to anchor a Rubio catheter during treatment if his symptoms worsen.  He acknowledged his understanding.  I have sent in a prescription for Valium to help with claustrophobia and I will see him back later this week for CyberKnife treatment planning.    RECOMMENDATIONS:  No follow-ups on file.    Jeronimo Schwartz MD

## 2025-03-14 ENCOUNTER — HOSPITAL ENCOUNTER (OUTPATIENT)
Dept: RADIATION ONCOLOGY | Facility: HOSPITAL | Age: 75
Discharge: HOME | End: 2025-03-14

## 2025-03-14 ENCOUNTER — HOSPITAL ENCOUNTER (OUTPATIENT)
Dept: MRI IMAGING | Facility: HOSPITAL | Age: 75
Discharge: HOME | End: 2025-03-14
Payer: MEDICARE

## 2025-03-14 DIAGNOSIS — C61 PROSTATE CANCER: ICD-10-CM

## 2025-03-14 PROCEDURE — 72195 MRI PELVIS W/O DYE: CPT

## 2025-03-14 PROCEDURE — 77399 UNLISTED PX MED RADJ PHYSICS: CPT | Performed by: RADIOLOGY

## 2025-03-20 PROCEDURE — 77300 RADIATION THERAPY DOSE PLAN: CPT | Performed by: RADIOLOGY

## 2025-03-20 PROCEDURE — 77338 DESIGN MLC DEVICE FOR IMRT: CPT | Performed by: RADIOLOGY

## 2025-03-20 PROCEDURE — 77301 RADIOTHERAPY DOSE PLAN IMRT: CPT | Performed by: RADIOLOGY

## 2025-03-24 ENCOUNTER — HOSPITAL ENCOUNTER (OUTPATIENT)
Dept: RADIATION ONCOLOGY | Facility: HOSPITAL | Age: 75
Setting detail: RADIATION/ONCOLOGY SERIES
Discharge: HOME | End: 2025-03-24
Payer: COMMERCIAL

## 2025-03-24 PROCEDURE — 77373 STRTCTC BDY RAD THER TX DLVR: CPT | Performed by: RADIOLOGY

## 2025-03-25 ENCOUNTER — HOSPITAL ENCOUNTER (OUTPATIENT)
Dept: RADIATION ONCOLOGY | Facility: HOSPITAL | Age: 75
Setting detail: RADIATION/ONCOLOGY SERIES
Discharge: HOME | End: 2025-03-25
Payer: COMMERCIAL

## 2025-03-25 PROCEDURE — 77373 STRTCTC BDY RAD THER TX DLVR: CPT | Performed by: RADIOLOGY

## 2025-03-26 ENCOUNTER — HOSPITAL ENCOUNTER (OUTPATIENT)
Dept: RADIATION ONCOLOGY | Facility: HOSPITAL | Age: 75
Setting detail: RADIATION/ONCOLOGY SERIES
Discharge: HOME | End: 2025-03-26
Payer: COMMERCIAL

## 2025-03-26 PROCEDURE — 77373 STRTCTC BDY RAD THER TX DLVR: CPT | Performed by: RADIOLOGY

## 2025-03-27 ENCOUNTER — HOSPITAL ENCOUNTER (OUTPATIENT)
Dept: RADIATION ONCOLOGY | Facility: HOSPITAL | Age: 75
Setting detail: RADIATION/ONCOLOGY SERIES
Discharge: HOME | End: 2025-03-27
Payer: COMMERCIAL

## 2025-03-27 PROCEDURE — 77373 STRTCTC BDY RAD THER TX DLVR: CPT | Performed by: RADIOLOGY

## 2025-03-28 ENCOUNTER — HOSPITAL ENCOUNTER (OUTPATIENT)
Dept: RADIATION ONCOLOGY | Facility: HOSPITAL | Age: 75
Setting detail: RADIATION/ONCOLOGY SERIES
Discharge: HOME | End: 2025-03-28
Payer: COMMERCIAL

## 2025-03-28 PROCEDURE — 77336 RADIATION PHYSICS CONSULT: CPT | Performed by: RADIOLOGY

## 2025-03-28 PROCEDURE — 77373 STRTCTC BDY RAD THER TX DLVR: CPT | Performed by: RADIOLOGY

## 2025-03-31 ENCOUNTER — TELEPHONE (OUTPATIENT)
Dept: UROLOGY | Facility: CLINIC | Age: 75
End: 2025-03-31
Payer: MEDICARE

## 2025-03-31 NOTE — TELEPHONE ENCOUNTER
Patient called and wanted to let  know he hasn't be able to urinate on his own since 4 days ago. He has felt constipated but that has got better since he has been taking OTC medications for this. He said he has had to cath himself 3 times a day but when he tries to urinate on his own, nothing comes out. He said he did have flu like symptoms on the 28th but that has improved and hasn't had those in a couple days. He wanted to see what  recommends and what he needs to do.

## 2025-04-01 NOTE — TELEPHONE ENCOUNTER
Called and spoke with pt. He said that he had cyber knife Monday-Friday last week and this all started. He is really constipated but had a small BM this morning. He is cathing 2-3 times a day. When he caths, he gets 500cc to 600cc most times. He experienced urinary urgency last night, hourly.

## 2025-04-01 NOTE — TELEPHONE ENCOUNTER
PT CALLED BACK AND WAS TOLD THE MESSAGE THAT CLINICAL TOLD ME TO TELL HIM AND HE STILL WANTS JAN TO CALL HIM .    Number Of Freeze-Thaw Cycles: 1 freeze-thaw cycle Render Post-Care Instructions In Note?: no Duration Of Freeze Thaw-Cycle (Seconds): 5 Post-Care Instructions: I reviewed with the patient in detail post-care instructions. Patient is to wear sunprotection, and avoid picking at any of the treated lesions. Pt may apply Vaseline to crusted or scabbing areas. Consent: The patient's consent was obtained including but not limited to risks of crusting, scabbing, blistering, scarring, darker or lighter pigmentary change, recurrence, incomplete removal and infection. Detail Level: Detailed

## 2025-04-07 ENCOUNTER — OFFICE VISIT (OUTPATIENT)
Age: 75
End: 2025-04-07
Payer: MEDICARE

## 2025-04-07 VITALS — HEIGHT: 72 IN | BODY MASS INDEX: 35.39 KG/M2 | WEIGHT: 261.3 LBS

## 2025-04-07 DIAGNOSIS — K62.89 RECTAL PAIN: ICD-10-CM

## 2025-04-07 DIAGNOSIS — C61 PROSTATE CANCER: Primary | ICD-10-CM

## 2025-04-07 DIAGNOSIS — N32.89 BLADDER SPASMS: ICD-10-CM

## 2025-04-07 DIAGNOSIS — N30.00 ACUTE CYSTITIS WITHOUT HEMATURIA: ICD-10-CM

## 2025-04-07 DIAGNOSIS — N32.81 OAB (OVERACTIVE BLADDER): ICD-10-CM

## 2025-04-07 LAB
BILIRUB BLD-MCNC: NEGATIVE MG/DL
CLARITY, POC: ABNORMAL
COLOR UR: ABNORMAL
EXPIRATION DATE: ABNORMAL
GLUCOSE UR STRIP-MCNC: NEGATIVE MG/DL
KETONES UR QL: NEGATIVE
LEUKOCYTE EST, POC: ABNORMAL
Lab: ABNORMAL
NITRITE UR-MCNC: POSITIVE MG/ML
PH UR: 6 [PH] (ref 5–8)
PROT UR STRIP-MCNC: ABNORMAL MG/DL
RBC # UR STRIP: ABNORMAL /UL
SP GR UR: 1.02 (ref 1–1.03)
UROBILINOGEN UR QL: NORMAL

## 2025-04-07 PROCEDURE — 99214 OFFICE O/P EST MOD 30 MIN: CPT | Performed by: STUDENT IN AN ORGANIZED HEALTH CARE EDUCATION/TRAINING PROGRAM

## 2025-04-07 PROCEDURE — 87086 URINE CULTURE/COLONY COUNT: CPT | Performed by: STUDENT IN AN ORGANIZED HEALTH CARE EDUCATION/TRAINING PROGRAM

## 2025-04-07 PROCEDURE — 1159F MED LIST DOCD IN RCRD: CPT | Performed by: STUDENT IN AN ORGANIZED HEALTH CARE EDUCATION/TRAINING PROGRAM

## 2025-04-07 PROCEDURE — 1160F RVW MEDS BY RX/DR IN RCRD: CPT | Performed by: STUDENT IN AN ORGANIZED HEALTH CARE EDUCATION/TRAINING PROGRAM

## 2025-04-07 PROCEDURE — 51798 US URINE CAPACITY MEASURE: CPT | Performed by: STUDENT IN AN ORGANIZED HEALTH CARE EDUCATION/TRAINING PROGRAM

## 2025-04-07 PROCEDURE — 87077 CULTURE AEROBIC IDENTIFY: CPT | Performed by: STUDENT IN AN ORGANIZED HEALTH CARE EDUCATION/TRAINING PROGRAM

## 2025-04-07 PROCEDURE — 81003 URINALYSIS AUTO W/O SCOPE: CPT | Performed by: STUDENT IN AN ORGANIZED HEALTH CARE EDUCATION/TRAINING PROGRAM

## 2025-04-07 PROCEDURE — 87186 SC STD MICRODIL/AGAR DIL: CPT | Performed by: STUDENT IN AN ORGANIZED HEALTH CARE EDUCATION/TRAINING PROGRAM

## 2025-04-07 RX ORDER — METHYLPREDNISOLONE 4 MG/1
1 TABLET ORAL DAILY
Qty: 21 TABLET | Refills: 0 | Status: ON HOLD | OUTPATIENT
Start: 2025-04-07

## 2025-04-07 RX ORDER — SULFAMETHOXAZOLE AND TRIMETHOPRIM 800; 160 MG/1; MG/1
1 TABLET ORAL 2 TIMES DAILY
Qty: 20 TABLET | Refills: 0 | Status: SHIPPED | OUTPATIENT
Start: 2025-04-07 | End: 2025-04-09 | Stop reason: SDUPTHER

## 2025-04-07 RX ORDER — HYOSCYAMINE SULFATE 0.12 MG/1
0.12 TABLET SUBLINGUAL EVERY 4 HOURS PRN
Qty: 30 TABLET | Refills: 2 | Status: ON HOLD | OUTPATIENT
Start: 2025-04-07

## 2025-04-07 RX ORDER — HYOSCYAMINE SULFATE 0.12 MG/1
0.12 TABLET SUBLINGUAL EVERY 4 HOURS PRN
Qty: 30 TABLET | Refills: 2 | Status: SHIPPED | OUTPATIENT
Start: 2025-04-07 | End: 2025-04-07

## 2025-04-07 RX ORDER — VIBEGRON 75 MG/1
75 TABLET, FILM COATED ORAL DAILY
Qty: 42 TABLET | Refills: 0 | Status: ON HOLD | COMMUNITY
Start: 2025-04-07

## 2025-04-07 RX ORDER — VIBEGRON 75 MG/1
75 TABLET, FILM COATED ORAL DAILY
Qty: 42 TABLET | Refills: 0 | COMMUNITY
Start: 2025-04-07 | End: 2025-04-07

## 2025-04-07 NOTE — PROGRESS NOTES
Follow Up Office Visit      Patient Name: Vishal Dejesus  : 1950   MRN: [id number]       Chief Complaint:    Chief Complaint   Patient presents with    Acute cystitis without hematuria    Prostate Cancer       Referring Provider: Orlando Curtis,*    History of Present Illness: Vishal Dejesus is a 74 y.o. male who presents today for follow up of numerous urologic concerns including high risk prostate cancer, significant BPH, chronic bladder outlet obstruction with severe urinary retention, now managing with intermittent catheterization.  Just completed CyberKnife radiation therapy roughly 9 days ago.  Patient states prior to CyberKnife he was able to somewhat naturally urinate but small volumes.  He has been catheterizing 3-4 times a day for his chronic urinary retention.  After completing CyberKnife now severe symptoms including rectal pain, constant frequent bowel movements, liquid bowel movements, anorexia and weight loss.  He is unable to naturally urinate and is catheter dependent at this time.  He is very displeased at the moment due to all of his ongoing symptoms.  He denies fevers or chills.  He catheterized for urine sample in the office today which appears purulent.  He denies blood in urine and denies fevers currently.  Has a history of recurrent UTI.    I will send a urine culture today and start him on antibiotics plus a steroid regimen for likely radiation induced inflammation.    Patient has been taking over-the-counter laxatives, I have asked him to stop the laxatives given his ongoing loose stools.  Patient states he has a constant sensation that he needs to have a bowel movement and he has been doing some significant straining on the toilet and sometimes states that this causes a little chest pressure or discomfort.  He denies shortness of breath or chest pain at rest.    Subjective      Review of System: Review of Systems   Constitutional:  Positive for chills and  "fever.   Genitourinary:  Positive for difficulty urinating and urgency.      I have reviewed the ROS documented by my clinical staff, I have updated appropriately and I agree. Adam Nguyen MD    I have reviewed and the following portions of the patient's history were updated as appropriate: past family history, past medical history, past social history, past surgical history and problem list.    Medications:     Current Outpatient Medications:     CARTIA  MG 24 hr capsule, Take 1 capsule by mouth Daily., Disp: , Rfl: 0    hyoscyamine (Levsin/SL) 0.125 MG SL tablet, Place 1 tablet under the tongue Every 4 (Four) Hours As Needed (Breakthrough bladder spasms)., Disp: 30 tablet, Rfl: 2    losartan (COZAAR) 50 MG tablet, Take 1 tablet by mouth Daily. for blood pressure, Disp: , Rfl:     MELATONIN ER PO, Take 1 tablet by mouth Every Night. Pure Zzzs, Disp: , Rfl:     metoprolol succinate XL (TOPROL-XL) 25 MG 24 hr tablet, Take 1 tablet by mouth Daily., Disp: , Rfl:     silodosin (RAPAFLO) 8 MG capsule capsule, Take 1 capsule by mouth Daily., Disp: , Rfl:     Vibegron (Gemtesa) 75 MG tablet, Take 1 tablet by mouth Daily., Disp: 42 tablet, Rfl: 0    Xarelto 20 MG tablet, Take 1 tablet by mouth Daily., Disp: , Rfl:     methylPREDNISolone (MEDROL) 4 MG dose pack, Take by mouth as directed on package instructions., Disp: 21 tablet, Rfl: 0    sulfamethoxazole-trimethoprim (Bactrim DS) 800-160 MG per tablet, Take 1 tablet by mouth 2 (Two) Times a Day., Disp: 20 tablet, Rfl: 0    Allergies:   Allergies   Allergen Reactions    Zosyn [Piperacillin-Tazobactam In Dex] Shortness Of Breath     Has tolerated Cefepime  Inpatient reaction 12/19: throat swelling and SOA req trx.       Post Void Residual Bladder Scan:  0 mL      Objective     Physical Exam:   Vital Signs:   Vitals:    04/07/25 0842   Weight: 119 kg (261 lb 4.8 oz)   Height: 182.9 cm (72\")   PainSc: 9    PainLoc: Penis     Body mass index is 35.44 kg/m². "     Physical Exam  Constitutional:       Appearance: Normal appearance.   HENT:      Head: Normocephalic and atraumatic.      Nose: Nose normal.   Eyes:      Extraocular Movements: Extraocular movements intact.      Conjunctiva/sclera: Conjunctivae normal.      Pupils: Pupils are equal, round, and reactive to light.   Musculoskeletal:         General: Normal range of motion.      Cervical back: Normal range of motion and neck supple.   Skin:     General: Skin is warm and dry.      Findings: No lesion or rash.   Neurological:      General: No focal deficit present.      Mental Status: He is alert and oriented to person, place, and time. Mental status is at baseline.   Psychiatric:         Mood and Affect: Mood normal.         Behavior: Behavior normal.         Labs:   Brief Urine Lab Results  (Last result in the past 365 days)        Color   Clarity   Blood   Leuk Est   Nitrite   Protein   CREAT   Urine HCG        04/07/25 0910 Dark Yellow   Cloudy   3+   Moderate (2+)   Positive   3+                   Urine Culture          12/19/2024    14:52 2/6/2025    13:56   Urine Culture   Urine Culture No growth  50,000 CFU/mL Escherichia coli         Lab Results   Component Value Date    GLUCOSE 147 (H) 12/21/2024    CALCIUM 8.5 (L) 12/21/2024     12/21/2024    K 4.3 02/28/2025    CO2 19.0 (L) 12/21/2024     (H) 12/21/2024    BUN 21 12/21/2024    CREATININE 0.81 12/21/2024    EGFRIFAFRI 70 10/04/2024    EGFRIFNONA 82 10/29/2019    BCR 25.9 (H) 12/21/2024    ANIONGAP 12.0 12/21/2024       Lab Results   Component Value Date    WBC 5.71 02/28/2025    HGB 15.6 02/28/2025    HCT 46.3 02/28/2025    MCV 91.9 02/28/2025     02/28/2025       Images:   MRI Cyberknife Pelvis Without Contrast  Result Date: 3/15/2025  Impression: 1. Motion-degraded exam. 2. Few fiduciary markers in the prostate gland. 3. Retroprostatic hydrogel spacer without overt rectal wall invasion. Electronically Signed: Orlando Varela MD   3/15/2025 1:41 PM EDT  Workstation ID: [id number]    NM PET/CT Skull Base to Mid Thigh  Result Date: 12/31/2024  Impression: 1.Focal uptake within the left posterior peripheral zone of the prostate suggestive of local disease. 2.No evidence of hyper avid metastatic disease. 3.Few scattered pulmonary micronodules measuring up to 3 mm are too small to characterize with PET. Recommend continued attention on follow-up. Electronically Signed: Joel Downey MD  12/31/2024 2:21 PM EST  Workstation ID: [id number]    CT Abdomen Pelvis With Contrast  Result Date: 12/19/2024  Impression: 1.Prostatomegaly. No prostatic abscess is identified. 2.Bladder wall thickening may be related to underdistention, increased trabeculation, or cystitis. Please correlate with urinalysis. 3. 2.6 cm hypodense structure seen about the pancreatic head, possibly an exophytic pancreatic cyst. Pancreatic protocol MRI may be useful to further characterize this finding. 4.Colonic diverticulosis. 5.Hepatic steatosis. 6.Additional findings as detailed above. Electronically Signed: Jeremy Mcneal MD  12/19/2024 12:01 PM EST  Workstation ID: [id number]    XR Chest 1 View  Result Date: 12/19/2024  Impression: No evidence of acute cardiopulmonary disease. Electronically Signed: Sid Parson MD  12/19/2024 10:16 AM EST  Workstation ID: [id number]      Measures:   Tobacco:   Vishal Dejesus  reports that he has never smoked. He has never used smokeless tobacco.      Assessment / Plan      Assessment/Plan:   74 y.o. male who presented today for follow up of numerous urologic concerns.  Patient has significant BPH, chronic bladder outlet obstruction, he has had PVRs identified in the office greater than 600 cc.  We have avoided prostate surgery as the patient elected to avoid a prostatectomy and he completed CyberKnife recently.  CyberKnife as likely cause significant issues and side effects.  He reports anorexia, dehydration, discussed with him and his wife  the need to monitor his symptoms very carefully.  If he continues to have poor oral intake he needs to go to the emergency room for admission to the hospital.  He will try starting drinking some Gatorade or electrolyte drinks, I have encouraged him to start drinking boost or Ensure protein shakes and try to get back to a normal diet.  I discussed with Dr. Schwartz his radiation oncologist is ongoing rectal symptoms and we will start him on a Medrol Dosepak.  He also has purulent looking urine on urinalysis today.  I will send a urine culture and start him on Bactrim.  He has other antibiotic allergies including to possibly penicillins.  He has ongoing bladder symptoms and is unable to naturally urinate, to alleviate some of his urgency frequency symptoms we will start him on Gemtesa for 6 weeks samples.  I also gave him Levsin dissolvable tablets for his breakthrough spasm concerns.  Unfortunately not much else I can do at the moment for him, I will see him back in close follow-up.    Diagnoses and all orders for this visit:    1. Prostate cancer [C61] (Primary)  -     PSA Diagnostic; Future  -     POC Urinalysis Dipstick, Automated  -     Urine Culture - Urine, Urine, Clean Catch; Future  -     Urine Culture - Urine, Urine, Clean Catch    2. Bladder spasms  -     Discontinue: hyoscyamine (Levsin/SL) 0.125 MG SL tablet; Place 1 tablet under the tongue Every 4 (Four) Hours As Needed (Breakthrough bladder spasms).  Dispense: 30 tablet; Refill: 2  -     hyoscyamine (Levsin/SL) 0.125 MG SL tablet; Place 1 tablet under the tongue Every 4 (Four) Hours As Needed (Breakthrough bladder spasms).  Dispense: 30 tablet; Refill: 2  -     POC Urinalysis Dipstick, Automated  -     Urine Culture - Urine, Urine, Clean Catch; Future  -     Urine Culture - Urine, Urine, Clean Catch    3. Rectal pain  -     methylPREDNISolone (MEDROL) 4 MG dose pack; Take by mouth as directed on package instructions.  Dispense: 21 tablet; Refill: 0  -      POC Urinalysis Dipstick, Automated  -     Urine Culture - Urine, Urine, Clean Catch; Future  -     Urine Culture - Urine, Urine, Clean Catch    4. OAB (overactive bladder)  -     Discontinue: Vibegron (Gemtesa) 75 MG tablet; Take 1 tablet by mouth Daily.  Dispense: 42 tablet; Refill: 0  -     Vibegron (Gemtesa) 75 MG tablet; Take 1 tablet by mouth Daily.  Dispense: 42 tablet; Refill: 0  -     POC Urinalysis Dipstick, Automated  -     Urine Culture - Urine, Urine, Clean Catch; Future  -     Urine Culture - Urine, Urine, Clean Catch    5. Acute cystitis without hematuria  -     sulfamethoxazole-trimethoprim (Bactrim DS) 800-160 MG per tablet; Take 1 tablet by mouth 2 (Two) Times a Day.  Dispense: 20 tablet; Refill: 0  -     POC Urinalysis Dipstick, Automated  -     Urine Culture - Urine, Urine, Clean Catch; Future  -     Urine Culture - Urine, Urine, Clean Catch           Follow Up:   No follow-ups on file.    I spent approximately 35 minutes providing clinical care for this patient; including review of patient's chart and provider documentation, face to face time spent with patient in examination room (obtaining history, performing physical exam, discussing diagnosis and management options), placing orders, and completing patient documentation.     Adam Nguyen MD  [facility] [place]

## 2025-04-09 DIAGNOSIS — N30.00 ACUTE CYSTITIS WITHOUT HEMATURIA: ICD-10-CM

## 2025-04-09 LAB — BACTERIA SPEC AEROBE CULT: ABNORMAL

## 2025-04-09 RX ORDER — SULFAMETHOXAZOLE AND TRIMETHOPRIM 800; 160 MG/1; MG/1
1 TABLET ORAL 2 TIMES DAILY
Qty: 20 TABLET | Refills: 0 | Status: ON HOLD | OUTPATIENT
Start: 2025-04-09

## 2025-04-14 ENCOUNTER — APPOINTMENT (OUTPATIENT)
Facility: HOSPITAL | Age: 75
End: 2025-04-14
Payer: MEDICARE

## 2025-04-14 ENCOUNTER — HOSPITAL ENCOUNTER (INPATIENT)
Facility: HOSPITAL | Age: 75
LOS: 1 days | Discharge: HOME | End: 2025-04-17
Attending: EMERGENCY MEDICINE | Admitting: INTERNAL MEDICINE
Payer: MEDICARE

## 2025-04-14 ENCOUNTER — TELEPHONE (OUTPATIENT)
Dept: UROLOGY | Facility: CLINIC | Age: 75
End: 2025-04-14
Payer: MEDICARE

## 2025-04-14 DIAGNOSIS — N30.90 CYSTITIS: Primary | ICD-10-CM

## 2025-04-14 PROBLEM — I48.91 A-FIB: Status: ACTIVE | Noted: 2019-11-18

## 2025-04-14 PROBLEM — A49.9 UTI (URINARY TRACT INFECTION), BACTERIAL: Status: ACTIVE | Noted: 2025-04-14

## 2025-04-14 PROBLEM — N39.0 UTI (URINARY TRACT INFECTION), BACTERIAL: Status: ACTIVE | Noted: 2025-04-14

## 2025-04-14 LAB
ALBUMIN SERPL-MCNC: 3.7 G/DL (ref 3.5–5.2)
ALBUMIN/GLOB SERPL: 1.5 G/DL
ALP SERPL-CCNC: 63 U/L (ref 39–117)
ALT SERPL W P-5'-P-CCNC: 50 U/L (ref 1–41)
ANION GAP SERPL CALCULATED.3IONS-SCNC: 11.5 MMOL/L (ref 5–15)
AST SERPL-CCNC: 38 U/L (ref 1–40)
BACTERIA UR QL AUTO: ABNORMAL /HPF
BASOPHILS # BLD AUTO: 0.01 10*3/MM3 (ref 0–0.2)
BASOPHILS NFR BLD AUTO: 0.2 % (ref 0–1.5)
BILIRUB SERPL-MCNC: 0.7 MG/DL (ref 0–1.2)
BILIRUB UR QL STRIP: NEGATIVE
BUN SERPL-MCNC: 18 MG/DL (ref 8–23)
BUN/CREAT SERPL: 15.8 (ref 7–25)
CALCIUM SPEC-SCNC: 9.6 MG/DL (ref 8.6–10.5)
CHLORIDE SERPL-SCNC: 103 MMOL/L (ref 98–107)
CLARITY UR: CLEAR
CO2 SERPL-SCNC: 23.5 MMOL/L (ref 22–29)
COLOR UR: YELLOW
CREAT SERPL-MCNC: 1.14 MG/DL (ref 0.76–1.27)
D-LACTATE SERPL-SCNC: 1.8 MMOL/L (ref 0.5–2)
DEPRECATED RDW RBC AUTO: 47 FL (ref 37–54)
EGFRCR SERPLBLD CKD-EPI 2021: 67.5 ML/MIN/1.73
EOSINOPHIL # BLD AUTO: 0.05 10*3/MM3 (ref 0–0.4)
EOSINOPHIL NFR BLD AUTO: 0.8 % (ref 0.3–6.2)
ERYTHROCYTE [DISTWIDTH] IN BLOOD BY AUTOMATED COUNT: 13.5 % (ref 12.3–15.4)
GLOBULIN UR ELPH-MCNC: 2.4 GM/DL
GLUCOSE SERPL-MCNC: 138 MG/DL (ref 65–99)
GLUCOSE UR STRIP-MCNC: NEGATIVE MG/DL
HCT VFR BLD AUTO: 49.6 % (ref 37.5–51)
HGB BLD-MCNC: 16.5 G/DL (ref 13–17.7)
HGB UR QL STRIP.AUTO: ABNORMAL
HYALINE CASTS UR QL AUTO: ABNORMAL /LPF
IMM GRANULOCYTES # BLD AUTO: 0.08 10*3/MM3 (ref 0–0.05)
IMM GRANULOCYTES NFR BLD AUTO: 1.3 % (ref 0–0.5)
KETONES UR QL STRIP: NEGATIVE
LEUKOCYTE ESTERASE UR QL STRIP.AUTO: ABNORMAL
LYMPHOCYTES # BLD AUTO: 0.68 10*3/MM3 (ref 0.7–3.1)
LYMPHOCYTES NFR BLD AUTO: 11.1 % (ref 19.6–45.3)
MCH RBC QN AUTO: 30.9 PG (ref 26.6–33)
MCHC RBC AUTO-ENTMCNC: 33.3 G/DL (ref 31.5–35.7)
MCV RBC AUTO: 92.9 FL (ref 79–97)
MONOCYTES # BLD AUTO: 0.38 10*3/MM3 (ref 0.1–0.9)
MONOCYTES NFR BLD AUTO: 6.2 % (ref 5–12)
MUCOUS THREADS URNS QL MICRO: ABNORMAL /HPF
NEUTROPHILS NFR BLD AUTO: 4.91 10*3/MM3 (ref 1.7–7)
NEUTROPHILS NFR BLD AUTO: 80.4 % (ref 42.7–76)
NITRITE UR QL STRIP: NEGATIVE
PH UR STRIP.AUTO: 6 [PH] (ref 5–8)
PLATELET # BLD AUTO: 134 10*3/MM3 (ref 140–450)
PMV BLD AUTO: 9.2 FL (ref 6–12)
POTASSIUM SERPL-SCNC: 4 MMOL/L (ref 3.5–5.2)
PROCALCITONIN SERPL-MCNC: 0.06 NG/ML (ref 0–0.25)
PROT SERPL-MCNC: 6.1 G/DL (ref 6–8.5)
PROT UR QL STRIP: ABNORMAL
RBC # BLD AUTO: 5.34 10*6/MM3 (ref 4.14–5.8)
RBC # UR STRIP: ABNORMAL /HPF
REF LAB TEST METHOD: ABNORMAL
SODIUM SERPL-SCNC: 138 MMOL/L (ref 136–145)
SP GR UR STRIP: >=1.03 (ref 1–1.03)
SQUAMOUS #/AREA URNS HPF: ABNORMAL /HPF
UROBILINOGEN UR QL STRIP: ABNORMAL
WBC # UR STRIP: ABNORMAL /HPF
WBC NRBC COR # BLD AUTO: 6.11 10*3/MM3 (ref 3.4–10.8)

## 2025-04-14 PROCEDURE — 25010000002 CEFTRIAXONE PER 250 MG: Performed by: NURSE PRACTITIONER

## 2025-04-14 PROCEDURE — 87040 BLOOD CULTURE FOR BACTERIA: CPT | Performed by: EMERGENCY MEDICINE

## 2025-04-14 PROCEDURE — 25810000003 SODIUM CHLORIDE 0.9 % SOLUTION 250 ML FLEX CONT: Performed by: NURSE PRACTITIONER

## 2025-04-14 PROCEDURE — 80053 COMPREHEN METABOLIC PANEL: CPT | Performed by: EMERGENCY MEDICINE

## 2025-04-14 PROCEDURE — 25810000003 SODIUM CHLORIDE 0.9 % SOLUTION: Performed by: NURSE PRACTITIONER

## 2025-04-14 PROCEDURE — 81001 URINALYSIS AUTO W/SCOPE: CPT | Performed by: EMERGENCY MEDICINE

## 2025-04-14 PROCEDURE — 84145 PROCALCITONIN (PCT): CPT | Performed by: EMERGENCY MEDICINE

## 2025-04-14 PROCEDURE — 25010000002 CEFTRIAXONE PER 250 MG: Performed by: EMERGENCY MEDICINE

## 2025-04-14 PROCEDURE — 83605 ASSAY OF LACTIC ACID: CPT | Performed by: EMERGENCY MEDICINE

## 2025-04-14 PROCEDURE — 99223 1ST HOSP IP/OBS HIGH 75: CPT | Performed by: NURSE PRACTITIONER

## 2025-04-14 PROCEDURE — 85025 COMPLETE CBC W/AUTO DIFF WBC: CPT | Performed by: EMERGENCY MEDICINE

## 2025-04-14 PROCEDURE — 99285 EMERGENCY DEPT VISIT HI MDM: CPT | Performed by: EMERGENCY MEDICINE

## 2025-04-14 PROCEDURE — 36415 COLL VENOUS BLD VENIPUNCTURE: CPT

## 2025-04-14 PROCEDURE — G0378 HOSPITAL OBSERVATION PER HR: HCPCS

## 2025-04-14 PROCEDURE — 25510000001 IOPAMIDOL 61 % SOLUTION: Performed by: EMERGENCY MEDICINE

## 2025-04-14 PROCEDURE — 74177 CT ABD & PELVIS W/CONTRAST: CPT

## 2025-04-14 PROCEDURE — 25010000002 HYDROMORPHONE 1 MG/ML SOLUTION: Performed by: INTERNAL MEDICINE

## 2025-04-14 PROCEDURE — 25010000002 HYDROMORPHONE 1 MG/ML SOLUTION: Performed by: EMERGENCY MEDICINE

## 2025-04-14 PROCEDURE — 87086 URINE CULTURE/COLONY COUNT: CPT | Performed by: EMERGENCY MEDICINE

## 2025-04-14 RX ORDER — TAMSULOSIN HYDROCHLORIDE 0.4 MG/1
0.4 CAPSULE ORAL DAILY
Status: DISCONTINUED | OUTPATIENT
Start: 2025-04-14 | End: 2025-04-17 | Stop reason: HOSPADM

## 2025-04-14 RX ORDER — LOSARTAN POTASSIUM 50 MG/1
50 TABLET ORAL DAILY
Status: DISCONTINUED | OUTPATIENT
Start: 2025-04-14 | End: 2025-04-17 | Stop reason: HOSPADM

## 2025-04-14 RX ORDER — BISACODYL 5 MG/1
5 TABLET, DELAYED RELEASE ORAL DAILY PRN
Status: DISCONTINUED | OUTPATIENT
Start: 2025-04-14 | End: 2025-04-17 | Stop reason: HOSPADM

## 2025-04-14 RX ORDER — POLYETHYLENE GLYCOL 3350 17 G/17G
17 POWDER, FOR SOLUTION ORAL DAILY PRN
Status: DISCONTINUED | OUTPATIENT
Start: 2025-04-14 | End: 2025-04-17 | Stop reason: HOSPADM

## 2025-04-14 RX ORDER — OXYBUTYNIN CHLORIDE 10 MG/1
10 TABLET, EXTENDED RELEASE ORAL DAILY
Status: DISCONTINUED | OUTPATIENT
Start: 2025-04-14 | End: 2025-04-17 | Stop reason: HOSPADM

## 2025-04-14 RX ORDER — SODIUM CHLORIDE 0.9 % (FLUSH) 0.9 %
10 SYRINGE (ML) INJECTION EVERY 12 HOURS SCHEDULED
Status: DISCONTINUED | OUTPATIENT
Start: 2025-04-14 | End: 2025-04-17 | Stop reason: HOSPADM

## 2025-04-14 RX ORDER — HYDROMORPHONE HYDROCHLORIDE 1 MG/ML
0.5 INJECTION, SOLUTION INTRAMUSCULAR; INTRAVENOUS; SUBCUTANEOUS ONCE
Refills: 0 | Status: DISCONTINUED | OUTPATIENT
Start: 2025-04-14 | End: 2025-04-14

## 2025-04-14 RX ORDER — BISACODYL 10 MG
10 SUPPOSITORY, RECTAL RECTAL DAILY PRN
Status: DISCONTINUED | OUTPATIENT
Start: 2025-04-14 | End: 2025-04-17 | Stop reason: HOSPADM

## 2025-04-14 RX ORDER — HYDROCODONE BITARTRATE AND ACETAMINOPHEN 7.5; 325 MG/1; MG/1
2 TABLET ORAL EVERY 4 HOURS PRN
Refills: 0 | Status: DISCONTINUED | OUTPATIENT
Start: 2025-04-14 | End: 2025-04-17 | Stop reason: HOSPADM

## 2025-04-14 RX ORDER — SODIUM CHLORIDE 9 MG/ML
75 INJECTION, SOLUTION INTRAVENOUS CONTINUOUS
Status: ACTIVE | OUTPATIENT
Start: 2025-04-14 | End: 2025-04-15

## 2025-04-14 RX ORDER — SODIUM CHLORIDE 9 MG/ML
40 INJECTION, SOLUTION INTRAVENOUS AS NEEDED
Status: DISCONTINUED | OUTPATIENT
Start: 2025-04-14 | End: 2025-04-17 | Stop reason: HOSPADM

## 2025-04-14 RX ORDER — SODIUM CHLORIDE 0.9 % (FLUSH) 0.9 %
10 SYRINGE (ML) INJECTION AS NEEDED
Status: DISCONTINUED | OUTPATIENT
Start: 2025-04-14 | End: 2025-04-17 | Stop reason: HOSPADM

## 2025-04-14 RX ORDER — HYOSCYAMINE SULFATE 0.12 MG/1
125 TABLET SUBLINGUAL EVERY 4 HOURS PRN
Status: DISCONTINUED | OUTPATIENT
Start: 2025-04-14 | End: 2025-04-17 | Stop reason: HOSPADM

## 2025-04-14 RX ORDER — IOPAMIDOL 612 MG/ML
85 INJECTION, SOLUTION INTRAVASCULAR
Status: COMPLETED | OUTPATIENT
Start: 2025-04-14 | End: 2025-04-14

## 2025-04-14 RX ORDER — AMOXICILLIN 250 MG
2 CAPSULE ORAL 2 TIMES DAILY
Status: DISCONTINUED | OUTPATIENT
Start: 2025-04-14 | End: 2025-04-17 | Stop reason: HOSPADM

## 2025-04-14 RX ORDER — DILTIAZEM HYDROCHLORIDE 180 MG/1
180 CAPSULE, COATED, EXTENDED RELEASE ORAL DAILY
Status: DISCONTINUED | OUTPATIENT
Start: 2025-04-14 | End: 2025-04-17 | Stop reason: HOSPADM

## 2025-04-14 RX ORDER — NALOXONE HCL 0.4 MG/ML
0.4 VIAL (ML) INJECTION
Status: DISCONTINUED | OUTPATIENT
Start: 2025-04-14 | End: 2025-04-17 | Stop reason: HOSPADM

## 2025-04-14 RX ORDER — PHENAZOPYRIDINE HYDROCHLORIDE 100 MG/1
200 TABLET, FILM COATED ORAL
Status: COMPLETED | OUTPATIENT
Start: 2025-04-14 | End: 2025-04-16

## 2025-04-14 RX ORDER — METOPROLOL SUCCINATE 25 MG/1
25 TABLET, EXTENDED RELEASE ORAL DAILY
Status: DISCONTINUED | OUTPATIENT
Start: 2025-04-14 | End: 2025-04-17 | Stop reason: HOSPADM

## 2025-04-14 RX ORDER — NITROGLYCERIN 0.4 MG/1
0.4 TABLET SUBLINGUAL
Status: DISCONTINUED | OUTPATIENT
Start: 2025-04-14 | End: 2025-04-17 | Stop reason: HOSPADM

## 2025-04-14 RX ADMIN — IOPAMIDOL 85 ML: 612 INJECTION, SOLUTION INTRAVENOUS at 11:22

## 2025-04-14 RX ADMIN — Medication 5 MG: at 21:06

## 2025-04-14 RX ADMIN — CEFTRIAXONE 2000 MG: 2 INJECTION, POWDER, FOR SOLUTION INTRAMUSCULAR; INTRAVENOUS at 12:34

## 2025-04-14 RX ADMIN — DILTIAZEM HYDROCHLORIDE 180 MG: 180 CAPSULE, EXTENDED RELEASE ORAL at 21:06

## 2025-04-14 RX ADMIN — HYDROMORPHONE HYDROCHLORIDE 1 MG: 1 INJECTION, SOLUTION INTRAMUSCULAR; INTRAVENOUS; SUBCUTANEOUS at 22:23

## 2025-04-14 RX ADMIN — TAMSULOSIN HYDROCHLORIDE 0.4 MG: 0.4 CAPSULE ORAL at 18:28

## 2025-04-14 RX ADMIN — OXYBUTYNIN CHLORIDE 10 MG: 10 TABLET, EXTENDED RELEASE ORAL at 21:05

## 2025-04-14 RX ADMIN — HYDROMORPHONE HYDROCHLORIDE 1 MG: 1 INJECTION, SOLUTION INTRAMUSCULAR; INTRAVENOUS; SUBCUTANEOUS at 17:50

## 2025-04-14 RX ADMIN — METOPROLOL SUCCINATE 25 MG: 25 TABLET, EXTENDED RELEASE ORAL at 21:05

## 2025-04-14 RX ADMIN — HYDROCODONE BITARTRATE AND ACETAMINOPHEN 2 TABLET: 7.5; 325 TABLET ORAL at 21:06

## 2025-04-14 RX ADMIN — SODIUM CHLORIDE 75 ML/HR: 9 INJECTION, SOLUTION INTRAVENOUS at 18:28

## 2025-04-14 RX ADMIN — HYDROMORPHONE HYDROCHLORIDE 1 MG: 1 INJECTION, SOLUTION INTRAMUSCULAR; INTRAVENOUS; SUBCUTANEOUS at 12:17

## 2025-04-14 RX ADMIN — PHENAZOPYRIDINE 200 MG: 100 TABLET ORAL at 21:07

## 2025-04-14 RX ADMIN — SODIUM CHLORIDE 2000 MG: 9 INJECTION, SOLUTION INTRAVENOUS at 17:50

## 2025-04-14 RX ADMIN — RIVAROXABAN 20 MG: 20 TABLET, FILM COATED ORAL at 21:05

## 2025-04-14 RX ADMIN — SENNOSIDES, DOCUSATE SODIUM 2 TABLET: 50; 8.6 TABLET, FILM COATED ORAL at 18:28

## 2025-04-14 NOTE — FSED PROVIDER NOTE
Subjective  History of Present Illness:    Notes from urology was reviewed.  The visit in question was regarding follow-up.  Patient states he was seen on/7/25.  Patient has a history of high risk prostate cancer, BPH, chronic bladder outlet obstruction with severe urinary retention managed by intermittent catheterization.  He had CyberKnife radiation 9 days ago.  After the CyberKnife he has modest improvement in urination but still has to self cath.  He is having severe rectal pain, frequent bowel movements with liquid stool, anorexia weight loss the catheterized urine he gave in the office was purulent this was sent for culture.  He was started on antibiotics and a steroid regimen.  He has previously grown out E. coli species and a culture obtained in 2/6/2025, Klebsiella grew out on 4/7/2025..  Today's urine dip demonstrated nitrate positivity.  It appears patient was prescribed Bactrim for his cystitis..  He was prescribed this antibiotic on/9/25.  He is now having pain in his right kidney as of this morning.  Came to the emergency department for further evaluation and treatment.    Patient states taking his medication without issue.  Feels like he is not getting any better.    Nurses Notes reviewed and agree, including vitals, allergies, social history and prior medical history.     REVIEW OF SYSTEMS: All systems reviewed and not pertinent unless noted.    Past Medical History:   Diagnosis Date    A-fib     Arthritis 2013    Benign prostatic hyperplasia     Coronary artery disease 2017    Elevated PSA 11-21-24    See MRI    History of medical problems     Heart Ablasion 2013    Hyperlipidemia     Hypertension     Obesity     Prostate cancer     Completed cyber knife    Urinary tract infection     Treated       Allergies:    Zosyn [piperacillin-tazobactam in dex]      Past Surgical History:   Procedure Laterality Date    APPENDECTOMY  1986    Knee repacement left 2018    CARDIAC ABLATION   "2013    CARDIAC SURGERY  2013    Ablasion    COLONOSCOPY  2021    EYE SURGERY  2020    Detached Retina.   Catarac removed.    GOLD SEED FIDUCIAL PLACEMENT N/A 03/07/2025    Procedure: TRANSPERINEAL GOLD FIDUCIAL MARKER PLACEMENT;  Surgeon: Jeronimo Schwartz MD;  [facility];  Service: Radiation Oncology;  Laterality: N/A;    JOINT REPLACEMENT  11/2018    Still hurts    KNEE SURGERY Left     PROSTATE SURGERY  3-2025    Cyber knife complete 3-    SEPTOPLASTY      VASECTOMY  2001    No         Social History     Socioeconomic History    Marital status:    Tobacco Use    Smoking status: Never    Smokeless tobacco: Never   Vaping Use    Vaping status: Never Used   Substance and Sexual Activity    Alcohol use: Yes     Alcohol/week: 10.0 standard drinks of alcohol     Types: 10 Drinks containing 0.5 oz of alcohol per week     Comment: Sure is good    Drug use: No    Sexual activity: Not Currently     Partners: Female     Birth control/protection: Vasectomy         Family History   Problem Relation Age of Onset    Hypertension Mother     Stroke Mother     Hypertension Sister     Diabetes Sister        Objective  Physical Exam:  BP (!) 155/102 (BP Location: Right arm, Patient Position: Sitting)   Pulse 73   Temp 97.7 °F (36.5 °C) (Oral)   Resp 18   Ht 180.3 cm (71\")   Wt 118 kg (261 lb 3.2 oz)   SpO2 95%   BMI 36.43 kg/m²      Physical Exam    [Primary Survey    Airway: Patent and protected  Breathing: Symmetric bilaterally  Circulation: Mentating well, responsive        Constitutional: Nontoxic appearance.  Psychological: No abnormalities of mood affect.  Head: Atraumatic  Eyes: Conjunctiva are non-injected. no scleral icterus.  ENT: No obvious congestion or obstruction noted  Neck: No obvious deformity.  ROM appears preserved  Chest: No deformity noted.  No paradoxical breathing noted  Respiratory: Respiratory effort was normal - no use of accessory respiratory muscles noted.  There is " no stridor.  Cardiovascular: Perfusion appears preserved - mentating well RRR no murmurs  Gastrointestinal: Abdomen nondistended.  Soft, no focal tenderness  Genitourinary: Right CVA tenderness present  Lymphatic: Not examined  Back: Not examined  Musculoskeletal: Musculoskeletal system is grossly intact.  There is no obvious deformity.  Neurological: Face: No Asymmetry.  Gross motor movement is intact in all 4 extremities.  Walks and ambulates without difficulty.  Patient exhibits normal speech.  Skin: No Pallor no obvious bruising.  No obvious rash.]      ED Course:      Lab Results (last 24 hours)       ** No results found for the last 24 hours. **             No radiology results from the last 24 hrs       Telemetry Scan   Final Result          Procedures    MDM      Initial impression of presenting illness and DDX (differential diagnosis includes but not limited to those included): Adult male with ongoing CyberKnife treatment for prostate cancer, urinary retention presenting for symptoms concerning for ascending urinary tract infection.  Review of the culture shows he grew out Klebsiella pneumonia a which should have been sensitive to Bactrim.  Pending laboratory studies repeat urine.  Will obtain CT abdomen pelvis with IV contrast.  Pain is currently well-controlled.  Will reassess    diagnostic and therapeutic plan was ordered and interpreted by TRINO Irving MD with emphasis on identifying and treating emergent/urgent morbid conditions.     ED Course as of 04/20/25 0704   Mon Apr 14, 2025   1119 Lactate: 1.8 [ESTRELLA]   1119 Procalcitonin: 0.06 [ESTRELLA]   1119 WBC: 6.11  Urinalysis shows hematuria, pyuria, trace bacteria.  Normal lactate and Pro-Raul.  White blood cell count is nonactionable. [ESTRELLA]   1146 CT shows fat stranding consistent with a cystitis. Will discuss with his urologist  [ESTRELLA]   1205 Dr Nguyen agrees with admission for abx, cultures, pain control. Called acc for transfer process       [ESTRELLA]   9839  Discussed with Dr Interiano. Will transfer. Patient stable. Will send by private car per patient and wife request; he does not need ambulance transport   [ESTRELLA]      ED Course User Index  [ESTRELLA] Chang Irving MD        Medications   sodium chloride 0.9 % infusion (0 mL/hr Intravenous Stopped 4/15/25 0730)   cefTRIAXone (ROCEPHIN) 2,000 mg in sodium chloride 0.9 % 100 mL MBP (0 mg Intravenous Stopped 4/14/25 1306)   iopamidol (ISOVUE-300) 61 % injection 85 mL (85 mL Intravenous Given 4/14/25 1122)   HYDROmorphone (DILAUDID) injection 1 mg (1 mg Intravenous Given 4/14/25 1217)   phenazopyridine (PYRIDIUM) tablet 200 mg (200 mg Oral Given 4/16/25 1215)       HEART SCORE   No data recorded           -----  ED Disposition       ED Disposition   Decision to Admit    Condition   --    Comment   Level of Care: Telemetry [5]   Accepting Provider:: HENRIETTA INTERIANO [[id number]]               Final diagnoses:   Cystitis      Your Follow-Up Providers       Orlando Curtis MD Follow up on 4/24/2025.    Specialty: Family Medicine  Follow up details: @2:30  [address] [address]  [phone]                       Contact information for after-discharge care    Follow-up information has not been specified.                    Your medication list        START taking these medications        Instructions Last Dose Given Next Dose Due   famotidine 20 MG tablet  Commonly known as: Pepcid      Take 1 tablet by mouth Daily for 14 days.       predniSONE 20 MG tablet  Commonly known as: DELTASONE  Start taking on: April 18, 2025      Take 1 tablet by mouth Daily for 14 days.              CONTINUE taking these medications        Instructions Last Dose Given Next Dose Due   Cartia  MG 24 hr capsule  Generic drug: dilTIAZem CD      Take 1 capsule by mouth Daily.       Gemtesa 75 MG tablet  Generic drug: Vibegron      Take 1 tablet by mouth Daily.       hyoscyamine 0.125 MG SL tablet  Commonly known as:  Levsin/SL      Place 1 tablet under the tongue Every 4 (Four) Hours As Needed (Breakthrough bladder spasms).       losartan 50 MG tablet  Commonly known as: COZAAR      Take 1 tablet by mouth Daily. for blood pressure       MELATONIN ER PO      Take 1 tablet by mouth Every Night. Pure Zzzs       metoprolol succinate XL 25 MG 24 hr tablet  Commonly known as: TOPROL-XL      Take 1 tablet by mouth Daily.       silodosin 8 MG capsule capsule  Commonly known as: RAPAFLO      Take 1 capsule by mouth Daily.       sulfamethoxazole-trimethoprim 800-160 MG per tablet  Commonly known as: Bactrim DS      Take 1 tablet by mouth 2 (Two) Times a Day for 7 days.       Xarelto 20 MG tablet  Generic drug: rivaroxaban      Take 1 tablet by mouth Daily.              STOP taking these medications      methylPREDNISolone 4 MG dose pack  Commonly known as: MEDROL                  Where to Get Your Medications        These medications were sent to [facility] [place] [id number]      Hours: Monday to Friday 7 AM to 5:30 PM, Saturday & Sunday 8 AM to 4:30 PM Phone: [phone]   famotidine 20 MG tablet  predniSONE 20 MG tablet  sulfamethoxazole-trimethoprim 800-160 MG per tablet

## 2025-04-14 NOTE — H&P
[facility] Medicine Services  HISTORY AND PHYSICAL    Patient Name: Vishal Dejesus  : 1950  MRN: [id number]  Primary Care Physician: Orlando Curtis MD  Date of admission: 2025    Subjective   Subjective     Chief Complaint:  Flank pain    HPI:  Vishal Dejesus is a 74 y.o. male with PMH significant for BPH, high-risk prostate cancer, chronic bladder outlet obstruction with severe urinary retention (self-caths), recurrent UTIs (E.coli), diverticulosis, paroxysmal a-fib chronically anticoagulated on Xarelto, CVA, HTN, HLD who presented to [facility] for right flank pain and was transferred to [facility] for hospital admission. Patient follows with Dr. Nguyen and was recently seen 2025 for ongoing severe rectal pain, frequent bowel movements with liquid stool, weight loss, purulent urine, and more frequent need for self-caths. Patient noted these symptoms had worsened since undergoing Cyberknife radiation with last treatment 3/28.  Dr. Nguyen sent urine for culture which returned positive for Klebsiella. He was started on a 10-day course of Bactrim, along with hyoscyamine and methylprednisolone. Patient reports increased right flank pain and constipation over the past 3-4 days and decided to come to the ED for further evaluation. He denies fever, chills, gross hematuria. Patient rates his pain 8/10 that improved with IV Dilaudid that he received at [facility]. Patient describes sensation of needing to urinate frequently with bladder pressure/urgency but denies burning. He reports feeling of needing to have a BM frequently but can't. He did have a BM today after taking laxatives for several days. He notes these symptoms have been ongoing since he had radiation.     Upon presentation to the hospital, patient is afebrile and VS are stable. Labs are mostly unremarkable, although patient does have neutrophilia. UA is notable for large blood, WBCs too numerous to  count, and trace bacteria. CT A&P shows urinary bladder wall thickening with inflammatory stranding compatible with cystitis but without evidence of hydronephrosis or hydroureter. It also notes an 8 cm exophytic cyst on the right kidney seen on previous CT, and 2 retroperitoneal lymph nodes not mentioned on prior CT.        Personal History     Past Medical History:   Diagnosis Date    A-fib     Arthritis 2013    Benign prostatic hyperplasia     Coronary artery disease 2017    Elevated PSA 11-21-24    See MRI    History of medical problems     Heart Ablasion 2013    Hyperlipidemia     Hypertension     Obesity     Prostate cancer     Completed cyber knife    Urinary tract infection     Treated         Oncology Problem List:  Prostate cancer (01/24/2025; Status: Active)    Oncology/Hematology History   Prostate cancer   1/24/2025 Initial Diagnosis    Prostate cancer     1/24/2025 Cancer Staged    Staging form: Prostate, AJCC 8th Edition  - Clinical stage from 1/24/2025: Stage IIC (cT1c, cN0, cM0, PSA: 7.3, Grade Group: 4) - Signed by Jeronimo Schwartz MD on 1/24/2025     3/24/2025 - 3/28/2025 Radiation    Radiation OncologyTreatment Course:  Vishal Dejesus received 3500 cGy in 5 fractions to prostate and seminal vesicles via Stereotactic Radiation Therapy - SRT.         Past Surgical History:   Procedure Laterality Date    APPENDECTOMY  1986    Knee repacement left 2018    CARDIAC ABLATION  2013    CARDIAC SURGERY  2013    Ablasion    COLONOSCOPY  2021    EYE SURGERY  2020    Detached Retina.   Catarac removed.    GOLD SEED FIDUCIAL PLACEMENT N/A 03/07/2025    Procedure: TRANSPERINEAL GOLD FIDUCIAL MARKER PLACEMENT;  Surgeon: Jeronimo Schwartz MD;  Location: [facility];  Service: Radiation Oncology;  Laterality: N/A;    JOINT REPLACEMENT  11/2018    Still hurts    KNEE SURGERY Left     PROSTATE SURGERY  3-2025    Cyber knife complete 3-    SEPTOPLASTY      VASECTOMY   2001    No       Family History:  family history includes Diabetes in his sister; Hypertension in his mother and sister; Stroke in his mother.     Social History:  reports that he has never smoked. He has never used smokeless tobacco. He reports current alcohol use of about 10.0 standard drinks of alcohol per week. He reports that he does not use drugs.  Social History     Social History Narrative    Not on file       Medications:  Melatonin, Vibegron, dilTIAZem CD, hyoscyamine, losartan, methylPREDNISolone, metoprolol succinate XL, rivaroxaban, silodosin, and sulfamethoxazole-trimethoprim    Allergies   Allergen Reactions    Zosyn [Piperacillin-Tazobactam In Dex] Shortness Of Breath     Has tolerated Cefepime  Inpatient reaction 12/19: throat swelling and SOA req trx.       Objective   Objective     Vital Signs:   Temp:  [97.5 °F (36.4 °C)-97.6 °F (36.4 °C)] 97.5 °F (36.4 °C)  Heart Rate:  [63-91] 81  Resp:  [16-18] 18  BP: (104-152)/(67-84) 123/70    Physical Exam   Constitutional: Awake, alert  Eyes: PERRLA, sclerae anicteric, no conjunctival injection  HENT: NCAT, mucous membranes moist  Neck: Supple, no thyromegaly, no lymphadenopathy, trachea midline  Respiratory: Clear to auscultation bilaterally, nonlabored respirations, room air   Cardiovascular: Irregular rhythm, no murmurs, rubs, or gallops, palpable pedal pulses bilaterally  Gastrointestinal: Positive bowel sounds, soft, nontender, nondistended, R flank tenderness  Musculoskeletal: No bilateral ankle edema, no clubbing or cyanosis to extremities  Psychiatric: Appropriate affect, cooperative  Neurologic: Oriented x 3, strength symmetric in all extremities, Cranial Nerves grossly intact to confrontation, speech clear  Skin: No rashes      Result Review:  I have personally reviewed the results from the time of this admission to 4/14/2025 18:50 EDT and agree with these findings:  [x]  Laboratory list / accordion  [x]  Microbiology  [x]  Radiology  [x]   EKG/Telemetry   []  Cardiology/Vascular   []  Pathology  [x]  Old records  []  Other:  Most notable findings include:     LAB RESULTS:      Lab 04/14/25  0958   WBC 6.11   HEMOGLOBIN 16.5   HEMATOCRIT 49.6   PLATELETS 134*   NEUTROS ABS 4.91   IMMATURE GRANS (ABS) 0.08*   LYMPHS ABS 0.68*   MONOS ABS 0.38   EOS ABS 0.05   MCV 92.9   PROCALCITONIN 0.06   LACTATE 1.8         Lab 04/14/25  0958   SODIUM 138   POTASSIUM 4.0   CHLORIDE 103   CO2 23.5   ANION GAP 11.5   BUN 18   CREATININE 1.14   EGFR 67.5   GLUCOSE 138*   CALCIUM 9.6         Lab 04/14/25  0958   TOTAL PROTEIN 6.1   ALBUMIN 3.7   GLOBULIN 2.4   ALT (SGPT) 50*   AST (SGOT) 38   BILIRUBIN 0.7   ALK PHOS 63                     Brief Urine Lab Results  (Last result in the past 365 days)        Color   Clarity   Blood   Leuk Est   Nitrite   Protein   CREAT   Urine HCG        04/14/25 1053 Yellow   Clear   Large (3+)   Trace   Negative   100 mg/dL (2+)                 Microbiology Results (last 10 days)       Procedure Component Value - Date/Time    Urine Culture - Urine, Urine, Clean Catch [[id number]]  (Abnormal)  (Susceptibility) Collected: 04/07/25 1033    Lab Status: Final result Specimen: Urine, Clean Catch Updated: 04/09/25 0941     Urine Culture >100,000 CFU/mL Klebsiella pneumoniae ssp pneumoniae    Narrative:      Colonization of the urinary tract without infection is common. Treatment is discouraged unless the patient is symptomatic, pregnant, or undergoing an invasive urologic procedure.    Susceptibility        Klebsiella pneumoniae ssp pneumoniae      XENA      Amoxicillin + Clavulanate Susceptible      Ampicillin Resistant      Ampicillin + Sulbactam Susceptible      Cefazolin (Urine) Susceptible      Cefepime Susceptible      Ceftazidime Susceptible      Ceftriaxone Susceptible      Gentamicin Susceptible      Levofloxacin Susceptible      Nitrofurantoin Intermediate      Piperacillin + Tazobactam Susceptible      Trimethoprim + Sulfamethoxazole  Susceptible                                   CT Abdomen Pelvis With Contrast  Result Date: 4/14/2025  CT ABDOMEN PELVIS W CONTRAST Date of Exam: 4/14/2025 11:12 AM EDT Indication: right flank pain, recent cystitis, worsening pain. Comparison: PET/CT 12/31/2024 and CT abdomen pelvis 12/19/2024 Technique: Axial CT images were obtained of the abdomen and pelvis following the uneventful intravenous administration of 85 mL Isovue-300. Reconstructed coronal and sagittal images were also obtained. Automated exposure control and iterative construction methods were used. Findings: LUNG BASES:  Unremarkable without mass or infiltrate. LIVER: The liver has decreased attenuation compatible with steatosis. There is a cyst in the right hepatic lobe measuring 1.7 cm. BILIARY/GALLBLADDER:  Unremarkable SPLEEN:  Unremarkable PANCREAS: There is stable appearance to a 1.8 x 2.8 cm low attenuating lesion adjacent to the pancreatic head (image 44 of series 2). This measures water density and may represent an exophytic pancreatic cyst. ADRENAL:  Unremarkable KIDNEYS: Evaluation of the right kidney demonstrates an exophytic cyst along the superior pole measuring 8 cm. No evidence for hydronephrosis or hydroureter. The left kidney is uniform in appearance without hydronephrosis or hydroureter. No evidence for nephrolithiasis. GASTROINTESTINAL/MESENTERY: Evaluation of the gastrointestinal tract demonstrates diverticulosis without CT evidence for acute diverticulitis. No inflammatory changes in the right lower quadrant. Spacer device is noted interposed between the anterior margin of the sigmoid and posterior margin of the prostate. MESENTERIC VESSELS:  Patent. AORTA/IVC:  Normal caliber. RETROPERITONEUM/LYMPH NODES: There is stable appearance to a lymph node adjacent to the left external iliac chain measuring 9 mm in short axis (image 109 of series 2). A lymph node is noted along the right external iliac chain also measuring 9 mm (image  112). REPRODUCTIVE: The prostate demonstrates fiduciary markers measuring 5.4 cm transversely. BLADDER: The urinary bladder is decompressed but demonstrates prominent wall thickening measuring up to 2.4 cm in diameter. There is adjacent inflammatory fat stranding compatible with cystitis. A diverticulum is noted along the right superior lateral margin. OSSEUS STRUCTURES:  Typical for age with no acute process identified.     Impression: Impression: Urinary bladder wall thickening with inflammatory fat stranding compatible with cystitis. Diverticulosis. Stable appearance to pancreatic cyst measuring 2.8 cm. Electronically Signed: Mitzi Gilliam MD  4/14/2025 11:42 AM EDT  Workstation ID: [id number]          Assessment & Plan   Assessment & Plan       Benign essential hypertension    History of cerebrovascular accident    A-fib    Prostate cancer    UTI (urinary tract infection), bacterial    Vishal Dejesus is a 74 y.o. male with PMH significant for BPH, high-risk prostate cancer, chronic bladder outlet obstruction with severe urinary retention (self-caths), recurrent UTIs (E.coli), diverticulosis, paroxysmal a-fib chronically anticoagulated on Xarelto, CVA, HTN, HLD who presented to [facility] for right flank pain and transferred to [facility] for hospital admission. Patient follows with Dr. Nguyen with urology and was recently seen 4/7/2025 for ongoing rectal pain, more frequent need for self-caths, and purulent urine. Found to have Klebsiella UTI on culture and started on Bactrim 4/9. Reports above symptoms have been worse since radiation (Dr. Schwartz), which finished 3/28.     Klebsiella UTI, failed outpatient PO antibiotics  Urinary retention   Prostate cancer with CyberKnife radiation  Constipation and rectal pain   - pt notes increased symptoms since getting cyberknife tx  --Urine culture from 4/7 grew Klebsiella, started on Bactrim (susceptible)  --UA today with hematuria, WBCs too numerous to count.  CT  showing changes of cystitis.    ---Started on IV Rocephin in ED - Continue  --Consult Dr. Nguyen with urology  --in AM, call Dr. 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 Middleton  --Continue Xarelto  --Continue metoprolol    HTN  HLD  --Hold home losartan, currently normotensive and did not take BP meds today    H/o CVA  --Xarelto, statin    DVT prophylaxis:  Medical    CODE STATUS:    Code Status (Patient has no pulse and is not breathing): CPR (Attempt to Resuscitate)  Medical Interventions (Patient has pulse or is breathing): Full Support  Level Of Support Discussed With: Patient      Expected Discharge  Expected Discharge Date: 4/16/2025; Expected Discharge Time:       This note has been completed as part of a split-shared workflow.     Signature: Electronically signed by DEWEY Ellis, 04/14/25, 4:48 PM EDT

## 2025-04-14 NOTE — ED NOTES
Vishal Dejesus    Nursing Report ED to Floor:  Mental status: aox4  Ambulatory status: independent   Oxygen Therapy:  Ra  Cardiac Rhythm: nsr  Admitted from: home  Safety Concerns:  none  Precautions: none  Social Issues: none  ED Room #:  5    ED Nurse Phone Rayxbqdik [phone] or may call [phone].      HPI:   Chief Complaint   Patient presents with    Flank Pain       Past Medical History:  Past Medical History:   Diagnosis Date    A-fib     Arthritis 2013    Benign prostatic hyperplasia     Coronary artery disease 2017    Elevated PSA 11-21-24    See MRI    History of medical problems     Heart Ablasion 2013    Hyperlipidemia     Hypertension     Obesity     Prostate cancer     Completed cyber knife    Urinary tract infection     Treated        Past Surgical History:  Past Surgical History:   Procedure Laterality Date    APPENDECTOMY  1986    Knee repacement left 2018    CARDIAC ABLATION  2013    CARDIAC SURGERY  2013    Ablasion    COLONOSCOPY  2021    EYE SURGERY  2020    Detached Retina.   Catarac removed.    GOLD SEED FIDUCIAL PLACEMENT N/A 03/07/2025    Procedure: TRANSPERINEAL GOLD FIDUCIAL MARKER PLACEMENT;  Surgeon: Jeronimo Schwartz MD;  Location: [facility];  Service: Radiation Oncology;  Laterality: N/A;    JOINT REPLACEMENT  11/2018    Still hurts    KNEE SURGERY Left     PROSTATE SURGERY  3-2025    Cyber knife complete 3-    SEPTOPLASTY      VASECTOMY  2001    No        Admitting Doctor:   No admitting provider for patient encounter.    Consulting Provider(s):  Consults       No orders found from 3/16/2025 to 4/15/2025.             Admitting Diagnosis:   There were no encounter diagnoses.    Most Recent Vitals:   Vitals:    04/14/25 1104 04/14/25 1145 04/14/25 1218 04/14/25 1230   BP:   128/79 104/67   BP Location:       Patient Position:       Pulse: 66 83 65 63   Resp:       Temp:       TempSrc:       SpO2: 97% 95% 100% 97%   Weight:       Height:            Active LDAs/IV Access:   Lines, Drains & Airways       Active LDAs       Name Placement date Placement time Site Days    Peripheral IV 04/14/25 Left;Posterior Forearm 04/14/25  --  Forearm  less than 1                    Labs (abnormal labs have a star):   Labs Reviewed   COMPREHENSIVE METABOLIC PANEL - Abnormal; Notable for the following components:       Result Value    Glucose 138 (*)     ALT (SGPT) 50 (*)     All other components within normal limits    Narrative:     GFR Categories in Chronic Kidney Disease (CKD)      GFR Category          GFR (mL/min/1.73)    Interpretation  G1                     90 or greater         Normal or high (1)  G2                      60-89                Mild decrease (1)  G3a                   45-59                Mild to moderate decrease  G3b                   30-44                Moderate to severe decrease  G4                    15-29                Severe decrease  G5                    14 or less           Kidney failure          (1)In the absence of evidence of kidney disease, neither GFR category G1 or G2 fulfill the criteria for CKD.    eGFR calculation 2021 CKD-EPI creatinine equation, which does not include race as a factor   URINALYSIS W/ CULTURE IF INDICATED - Abnormal; Notable for the following components:    Blood, UA Large (3+) (*)     Protein,  mg/dL (2+) (*)     Leuk Esterase, UA Trace (*)     All other components within normal limits    Narrative:     In absence of clinical symptoms, the presence of pyuria, bacteria, and/or nitrites on the urinalysis result does not correlate with infection.   CBC WITH AUTO DIFFERENTIAL - Abnormal; Notable for the following components:    Platelets 134 (*)     Neutrophil % 80.4 (*)     Lymphocyte % 11.1 (*)     Immature Grans % 1.3 (*)     Lymphocytes, Absolute 0.68 (*)     Immature Grans, Absolute 0.08 (*)     All other components within normal limits   URINALYSIS, MICROSCOPIC ONLY - Abnormal; Notable for the  following components:    RBC, UA 21-50 (*)     WBC, UA Too Numerous to Count (*)     Bacteria, UA Trace (*)     Mucus, UA Small/1+ (*)     All other components within normal limits   LACTIC ACID, PLASMA - Normal   PROCALCITONIN - Normal   BLOOD CULTURE   BLOOD CULTURE   URINE CULTURE   CBC AND DIFFERENTIAL    Narrative:     The following orders were created for panel order CBC & Differential.  Procedure                               Abnormality         Status                     ---------                               -----------         ------                     CBC Auto Differential[[id number]]        Abnormal            Final result                 Please view results for these tests on the individual orders.       Meds Given in ED:   Medications   sodium chloride 0.9 % flush 10 mL (has no administration in time range)   cefTRIAXone (ROCEPHIN) 2,000 mg in sodium chloride 0.9 % 100 mL MBP (0 mg Intravenous Stopped 4/14/25 1306)   iopamidol (ISOVUE-300) 61 % injection 85 mL (85 mL Intravenous Given 4/14/25 1122)   HYDROmorphone (DILAUDID) injection 1 mg (1 mg Intravenous Given 4/14/25 1217)           Last NIH score:                                                          Dysphagia screening results:        [place] Coma Scale:  No data recorded     CIWA:        Restraint Type:            Isolation Status:  No active isolations

## 2025-04-15 PROBLEM — R10.9 FLANK PAIN: Status: ACTIVE | Noted: 2025-04-15

## 2025-04-15 LAB
ANION GAP SERPL CALCULATED.3IONS-SCNC: 10 MMOL/L (ref 5–15)
BUN SERPL-MCNC: 15 MG/DL (ref 8–23)
BUN/CREAT SERPL: 17.6 (ref 7–25)
CALCIUM SPEC-SCNC: 8.6 MG/DL (ref 8.6–10.5)
CHLORIDE SERPL-SCNC: 103 MMOL/L (ref 98–107)
CO2 SERPL-SCNC: 22 MMOL/L (ref 22–29)
CREAT SERPL-MCNC: 0.85 MG/DL (ref 0.76–1.27)
DEPRECATED RDW RBC AUTO: 49.3 FL (ref 37–54)
EGFRCR SERPLBLD CKD-EPI 2021: 91.2 ML/MIN/1.73
ERYTHROCYTE [DISTWIDTH] IN BLOOD BY AUTOMATED COUNT: 14 % (ref 12.3–15.4)
GLUCOSE SERPL-MCNC: 108 MG/DL (ref 65–99)
HCT VFR BLD AUTO: 45.9 % (ref 37.5–51)
HGB BLD-MCNC: 14.9 G/DL (ref 13–17.7)
MCH RBC QN AUTO: 31.2 PG (ref 26.6–33)
MCHC RBC AUTO-ENTMCNC: 32.5 G/DL (ref 31.5–35.7)
MCV RBC AUTO: 96 FL (ref 79–97)
PLATELET # BLD AUTO: 120 10*3/MM3 (ref 140–450)
PMV BLD AUTO: 9.7 FL (ref 6–12)
POTASSIUM SERPL-SCNC: 4.3 MMOL/L (ref 3.5–5.2)
RBC # BLD AUTO: 4.78 10*6/MM3 (ref 4.14–5.8)
SODIUM SERPL-SCNC: 135 MMOL/L (ref 136–145)
WBC NRBC COR # BLD AUTO: 5.99 10*3/MM3 (ref 3.4–10.8)

## 2025-04-15 PROCEDURE — 63710000001 PREDNISONE PER 1 MG: Performed by: RADIOLOGY

## 2025-04-15 PROCEDURE — 25010000002 HYDROMORPHONE 1 MG/ML SOLUTION: Performed by: INTERNAL MEDICINE

## 2025-04-15 PROCEDURE — 25010000002 CEFTRIAXONE PER 250 MG: Performed by: NURSE PRACTITIONER

## 2025-04-15 PROCEDURE — 85027 COMPLETE CBC AUTOMATED: CPT | Performed by: NURSE PRACTITIONER

## 2025-04-15 PROCEDURE — 99232 SBSQ HOSP IP/OBS MODERATE 35: CPT | Performed by: INTERNAL MEDICINE

## 2025-04-15 PROCEDURE — 51702 INSERT TEMP BLADDER CATH: CPT | Performed by: NURSE PRACTITIONER

## 2025-04-15 PROCEDURE — 80048 BASIC METABOLIC PNL TOTAL CA: CPT | Performed by: NURSE PRACTITIONER

## 2025-04-15 PROCEDURE — G0378 HOSPITAL OBSERVATION PER HR: HCPCS

## 2025-04-15 PROCEDURE — 99222 1ST HOSP IP/OBS MODERATE 55: CPT | Performed by: NURSE PRACTITIONER

## 2025-04-15 RX ORDER — PREDNISONE 20 MG/1
20 TABLET ORAL DAILY
Status: DISCONTINUED | OUTPATIENT
Start: 2025-04-15 | End: 2025-04-17 | Stop reason: HOSPADM

## 2025-04-15 RX ADMIN — PHENAZOPYRIDINE 200 MG: 100 TABLET ORAL at 17:50

## 2025-04-15 RX ADMIN — HYDROCODONE BITARTRATE AND ACETAMINOPHEN 2 TABLET: 7.5; 325 TABLET ORAL at 11:44

## 2025-04-15 RX ADMIN — HYDROMORPHONE HYDROCHLORIDE 1 MG: 1 INJECTION, SOLUTION INTRAMUSCULAR; INTRAVENOUS; SUBCUTANEOUS at 05:44

## 2025-04-15 RX ADMIN — PREDNISONE 20 MG: 20 TABLET ORAL at 11:29

## 2025-04-15 RX ADMIN — SENNOSIDES, DOCUSATE SODIUM 2 TABLET: 50; 8.6 TABLET, FILM COATED ORAL at 10:00

## 2025-04-15 RX ADMIN — TAMSULOSIN HYDROCHLORIDE 0.4 MG: 0.4 CAPSULE ORAL at 10:00

## 2025-04-15 RX ADMIN — Medication 10 ML: at 10:01

## 2025-04-15 RX ADMIN — RIVAROXABAN 20 MG: 20 TABLET, FILM COATED ORAL at 10:01

## 2025-04-15 RX ADMIN — SENNOSIDES, DOCUSATE SODIUM 2 TABLET: 50; 8.6 TABLET, FILM COATED ORAL at 20:34

## 2025-04-15 RX ADMIN — Medication 5 MG: at 20:34

## 2025-04-15 RX ADMIN — PENTOSAN POLYSULFATE SODIUM 100 MG: 100 CAPSULE, GELATIN COATED ORAL at 11:43

## 2025-04-15 RX ADMIN — DILTIAZEM HYDROCHLORIDE 180 MG: 180 CAPSULE, EXTENDED RELEASE ORAL at 10:01

## 2025-04-15 RX ADMIN — SODIUM CHLORIDE 2000 MG: 9 INJECTION, SOLUTION INTRAVENOUS at 17:49

## 2025-04-15 RX ADMIN — PHENAZOPYRIDINE 200 MG: 100 TABLET ORAL at 10:01

## 2025-04-15 RX ADMIN — PENTOSAN POLYSULFATE SODIUM 100 MG: 100 CAPSULE, GELATIN COATED ORAL at 17:50

## 2025-04-15 RX ADMIN — METOPROLOL SUCCINATE 25 MG: 25 TABLET, EXTENDED RELEASE ORAL at 10:01

## 2025-04-15 RX ADMIN — HYDROCODONE BITARTRATE AND ACETAMINOPHEN 2 TABLET: 7.5; 325 TABLET ORAL at 20:34

## 2025-04-15 RX ADMIN — OXYBUTYNIN CHLORIDE 10 MG: 10 TABLET, EXTENDED RELEASE ORAL at 10:01

## 2025-04-15 RX ADMIN — PHENAZOPYRIDINE 200 MG: 100 TABLET ORAL at 11:29

## 2025-04-15 NOTE — CASE MANAGEMENT/SOCIAL WORK
Discharge Planning Assessment  [facility]     Patient Name: Vishal Dejesus  MRN: [id number]  Today's Date: 4/15/2025    Admit Date: 4/14/2025    Plan: home   Discharge Needs Assessment       Row Name 04/15/25 0811       Living Environment    People in Home spouse    Primary Care Provided by self       Transition Planning    Patient/Family Anticipates Transition to home with family       Discharge Needs Assessment    Readmission Within the Last 30 Days no previous admission in last 30 days    Equipment Currently Used at Home none    Concerns to be Addressed basic needs;discharge planning                   Discharge Plan       Row Name 04/15/25 0812       Plan    Plan home    Patient/Family in Agreement with Plan yes    Plan Comments I met with this patient bedside. He lives with his wife in [place]. He is independent with activities of daily living and mobility. He anticipates returning home after this hsopitalization, and his wife can transport. Case management will follow.    Final Discharge Disposition Code 01 - home or self-care                    Expected Discharge Date and Time       Expected Discharge Date Expected Discharge Time    Apr 16, 2025            Demographic Summary       Row Name 04/15/25 0810       General Information    General Information Comments I confirmed that Orlando Curtis is Mr Dejesus's PCP and he has Medicare AB and [place] [employer]                   Functional Status       Row Name 04/15/25 0811       Functional Status, IADL    Medications independent    Meal Preparation independent    Housekeeping independent    Laundry independent    Shopping independent                   Psychosocial    No documentation.                  Abuse/Neglect    No documentation.                  Legal    No documentation.                  Substance Abuse    No documentation.                  Patient Forms    No documentation.                     Danielle Lundberg RN

## 2025-04-15 NOTE — PROGRESS NOTES
RADIATION ONCOLOGY TREATMENT MANAGEMENT NOTE    PATIENT:                                                      Vishal Dejesus  MEDICAL RECORD #:                        [id number]  :                                                          1950  DIAGNOSIS:     Prostate cancer  - Stage IIC (cT1c, cN0, cM0, PSA: 7.3, Grade Group: 4)      INTERVAL HISTORY: Mr. Dejesus is a known patient with high risk prostate cancer.  He has had a long standing history of bladder outlet obstruction with urinary urgency, and he has been self-cathing four times per day preceding his prostate cancer diagnosis.  He recently completed Cyberknife treatments to his prostate just over 3 weeks ago.  He is now admitted with increasing urinary urgency, pain, burning, with additional rectal symptoms and a Klebsiella UTI.  He has been on Bactrim as an outpatient, but his antibiotics have been switched to Ceftriaxone while admitted.  He states his most significant symptoms are related to the urgency and pain with his bladder, but also equally are the urgency and discomfort with his bowel movements.  I was consulted to discuss management options.    MEDICATIONS: Medication reconciliation for the patient was reviewed and confirmed in the electronic medical record.    KPS 80%    Physical Exam  Vitals and nursing note reviewed.   Constitutional:       General: He is not in acute distress.     Appearance: He is well-developed.   HENT:      Head: Normocephalic and atraumatic.   Eyes:      Conjunctiva/sclera: Conjunctivae normal.      Pupils: Pupils are equal, round, and reactive to light.   Cardiovascular:      Rate and Rhythm: Normal rate and regular rhythm.      Heart sounds: No murmur heard.     No friction rub.   Pulmonary:      Effort: Pulmonary effort is normal.      Breath sounds: Normal breath sounds. No wheezing.   Abdominal:      General: Bowel sounds are normal. There is no distension.      Palpations: Abdomen is soft. There is no  mass.      Tenderness: There is no abdominal tenderness.   Musculoskeletal:         General: Normal range of motion.      Cervical back: Normal range of motion and neck supple.   Lymphadenopathy:      Cervical: No cervical adenopathy.   Skin:     General: Skin is warm and dry.   Neurological:      Mental Status: He is alert and oriented to person, place, and time.   Psychiatric:         Behavior: Behavior normal.         Thought Content: Thought content normal.         Judgment: Judgment normal.         VITAL SIGNS:   Vitals:    04/15/25 0600 04/15/25 0700 04/15/25 0836 04/15/25 0900   BP:   120/91    BP Location:   Right arm    Patient Position:   Sitting    Pulse: 63 50 66 63   Resp:   18    Temp:   97.5 °F (36.4 °C)    TempSrc:   Oral    SpO2: 94% 92% 94%    Weight:       Height:           The following portions of the patient's history were reviewed and updated as appropriate: allergies, current medications, past family history, past medical history, past social history, past surgical history and problem list.    IMAGING  I have personally reviewed the relevant imaging studies, as follows:  CT Abdomen Pelvis With Contrast  Result Date: 4/14/2025  CT ABDOMEN PELVIS W CONTRAST Date of Exam: 4/14/2025 11:12 AM EDT Indication: right flank pain, recent cystitis, worsening pain. Comparison: PET/CT 12/31/2024 and CT abdomen pelvis 12/19/2024 Technique: Axial CT images were obtained of the abdomen and pelvis following the uneventful intravenous administration of 85 mL Isovue-300. Reconstructed coronal and sagittal images were also obtained. Automated exposure control and iterative construction methods were used. Findings: LUNG BASES:  Unremarkable without mass or infiltrate. LIVER: The liver has decreased attenuation compatible with steatosis. There is a cyst in the right hepatic lobe measuring 1.7 cm. BILIARY/GALLBLADDER:  Unremarkable SPLEEN:  Unremarkable PANCREAS: There is stable appearance to a 1.8 x 2.8 cm low  attenuating lesion adjacent to the pancreatic head (image 44 of series 2). This measures water density and may represent an exophytic pancreatic cyst. ADRENAL:  Unremarkable KIDNEYS: Evaluation of the right kidney demonstrates an exophytic cyst along the superior pole measuring 8 cm. No evidence for hydronephrosis or hydroureter. The left kidney is uniform in appearance without hydronephrosis or hydroureter. No evidence for nephrolithiasis. GASTROINTESTINAL/MESENTERY: Evaluation of the gastrointestinal tract demonstrates diverticulosis without CT evidence for acute diverticulitis. No inflammatory changes in the right lower quadrant. Spacer device is noted interposed between the anterior margin of the sigmoid and posterior margin of the prostate. MESENTERIC VESSELS:  Patent. AORTA/IVC:  Normal caliber. RETROPERITONEUM/LYMPH NODES: There is stable appearance to a lymph node adjacent to the left external iliac chain measuring 9 mm in short axis (image 109 of series 2). A lymph node is noted along the right external iliac chain also measuring 9 mm (image 112). REPRODUCTIVE: The prostate demonstrates fiduciary markers measuring 5.4 cm transversely. BLADDER: The urinary bladder is decompressed but demonstrates prominent wall thickening measuring up to 2.4 cm in diameter. There is adjacent inflammatory fat stranding compatible with cystitis. A diverticulum is noted along the right superior lateral margin. OSSEUS STRUCTURES:  Typical for age with no acute process identified.     Impression: Urinary bladder wall thickening with inflammatory fat stranding compatible with cystitis. Diverticulosis. Stable appearance to pancreatic cyst measuring 2.8 cm. Electronically Signed: Mitzi Gilliam MD  4/14/2025 11:42 AM EDT  Workstation ID: [id number]    LABORATORY         IMPRESSION/PLAN:  Mr. Dejesus is a 74 year old gentleman with high grade prostate cancer post Cyberknife and chronic bladder outlet obstruction.    Interstitial  Cystitis - this is likely multifactorial but due to the recent radiation treatments, ongoing UTI, and additional trauma related to the repetitive in-and-out catheterizations.  I recommend that we add Elmiron, place him on steroids, and I had a long talk with him about anchoring a clay catheter in order to reduce the ongoing trauma.  He was agreeable.  I have entered orders for Elmiron and I recommend we continue him on steroids for at least 2 weeks.  I contacted Dr. Nguyen who agreed and his team will be seeing Mr. Dejesus to discuss clay placement.  Ideally, if we can treat the infection, and then reduce the inflammation, I anticipate his symptoms should improve within the next week or so.  Long term, Dr. Nguyen is planning to perform a Turp once we are sufficiently far enough out from radiation for it to be safe.  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  I anticipate then that his symptoms are related more to pelvic inflammation stemming from the bladder.  I recommend that we keep him on stool softeners and laxatives as needed.  I suspect this will improve as we get his bladder symptoms under control.    I will continue to follow Mr. Dejesus while inpatient and will plan to see him back in my clinic in the next 2 weeks for re-evaluation of his symptoms.    Jeronimo Schwartz MD

## 2025-04-15 NOTE — PROGRESS NOTES
[facility] Medicine Services  PROGRESS NOTE    Patient Name: Vishal Dejesus  : 1950  MRN: [id number]    Date of Admission: 2025  Primary Care Physician: Orlando Curtis MD    Subjective   Subjective     CC:  Follow up UTI     HPI:  Seen laying comfortable in bed, no acute distress, had already spoken with Urology and Radiation Oncology, has a clay in place, did not have any acute complaints       Objective   Objective     Vital Signs:   Temp:  [97.5 °F (36.4 °C)-97.9 °F (36.6 °C)] 97.7 °F (36.5 °C)  Heart Rate:  [54-91] 63  Resp:  [16-18] 16  BP: (104-152)/(67-84) 115/81     Physical Exam:  Physical Exam  Constitutional:       Appearance: Normal appearance.   HENT:      Head: Normocephalic.      Mouth/Throat:      Mouth: Mucous membranes are moist.      Pharynx: Oropharynx is clear.   Eyes:      Extraocular Movements: Extraocular movements intact.   Cardiovascular:      Rate and Rhythm: Normal rate and regular rhythm.   Pulmonary:      Effort: Pulmonary effort is normal. No respiratory distress.   Abdominal:      General: There is no distension.      Palpations: Abdomen is soft.      Tenderness: There is no abdominal tenderness.   Musculoskeletal:      Right lower leg: No edema.      Left lower leg: No edema.   Skin:     General: Skin is warm.   Neurological:      Mental Status: He is alert and oriented to person, place, and time.   Psychiatric:         Mood and Affect: Mood normal.         Behavior: Behavior normal.          Results Reviewed:  LAB RESULTS:      Lab 25  0958   WBC 6.11   HEMOGLOBIN 16.5   HEMATOCRIT 49.6   PLATELETS 134*   NEUTROS ABS 4.91   IMMATURE GRANS (ABS) 0.08*   LYMPHS ABS 0.68*   MONOS ABS 0.38   EOS ABS 0.05   MCV 92.9   PROCALCITONIN 0.06   LACTATE 1.8         Lab 25  0958   SODIUM 138   POTASSIUM 4.0   CHLORIDE 103   CO2 23.5   ANION GAP 11.5   BUN 18   CREATININE 1.14   EGFR 67.5   GLUCOSE 138*   CALCIUM 9.6         Lab  04/14/25  0958   TOTAL PROTEIN 6.1   ALBUMIN 3.7   GLOBULIN 2.4   ALT (SGPT) 50*   AST (SGOT) 38   BILIRUBIN 0.7   ALK PHOS 63                     Brief Urine Lab Results  (Last result in the past 365 days)        Color   Clarity   Blood   Leuk Est   Nitrite   Protein   CREAT   Urine HCG        04/14/25 1053 Yellow   Clear   Large (3+)   Trace   Negative   100 mg/dL (2+)                   Microbiology Results Abnormal       None            CT Abdomen Pelvis With Contrast  Result Date: 4/14/2025  CT ABDOMEN PELVIS W CONTRAST Date of Exam: 4/14/2025 11:12 AM EDT Indication: right flank pain, recent cystitis, worsening pain. Comparison: PET/CT 12/31/2024 and CT abdomen pelvis 12/19/2024 Technique: Axial CT images were obtained of the abdomen and pelvis following the uneventful intravenous administration of 85 mL Isovue-300. Reconstructed coronal and sagittal images were also obtained. Automated exposure control and iterative construction methods were used. Findings: LUNG BASES:  Unremarkable without mass or infiltrate. LIVER: The liver has decreased attenuation compatible with steatosis. There is a cyst in the right hepatic lobe measuring 1.7 cm. BILIARY/GALLBLADDER:  Unremarkable SPLEEN:  Unremarkable PANCREAS: There is stable appearance to a 1.8 x 2.8 cm low attenuating lesion adjacent to the pancreatic head (image 44 of series 2). This measures water density and may represent an exophytic pancreatic cyst. ADRENAL:  Unremarkable KIDNEYS: Evaluation of the right kidney demonstrates an exophytic cyst along the superior pole measuring 8 cm. No evidence for hydronephrosis or hydroureter. The left kidney is uniform in appearance without hydronephrosis or hydroureter. No evidence for nephrolithiasis. GASTROINTESTINAL/MESENTERY: Evaluation of the gastrointestinal tract demonstrates diverticulosis without CT evidence for acute diverticulitis. No inflammatory changes in the right lower quadrant. Spacer device is noted  interposed between the anterior margin of the sigmoid and posterior margin of the prostate. MESENTERIC VESSELS:  Patent. AORTA/IVC:  Normal caliber. RETROPERITONEUM/LYMPH NODES: There is stable appearance to a lymph node adjacent to the left external iliac chain measuring 9 mm in short axis (image 109 of series 2). A lymph node is noted along the right external iliac chain also measuring 9 mm (image 112). REPRODUCTIVE: The prostate demonstrates fiduciary markers measuring 5.4 cm transversely. BLADDER: The urinary bladder is decompressed but demonstrates prominent wall thickening measuring up to 2.4 cm in diameter. There is adjacent inflammatory fat stranding compatible with cystitis. A diverticulum is noted along the right superior lateral margin. OSSEUS STRUCTURES:  Typical for age with no acute process identified.     Impression: Impression: Urinary bladder wall thickening with inflammatory fat stranding compatible with cystitis. Diverticulosis. Stable appearance to pancreatic cyst measuring 2.8 cm. Electronically Signed: Mitzi Gilliam MD  4/14/2025 11:42 AM EDT  Workstation ID: [id number]          Current medications:  Scheduled Meds:cefTRIAXone, 2,000 mg, Intravenous, Q24H  dilTIAZem CD, 180 mg, Oral, Daily  [Held by provider] losartan, 50 mg, Oral, Daily  melatonin, 5 mg, Oral, Nightly  metoprolol succinate XL, 25 mg, Oral, Daily  oxybutynin XL, 10 mg, Oral, Daily  phenazopyridine, 200 mg, Oral, TID With Meals  rivaroxaban, 20 mg, Oral, Daily  senna-docusate sodium, 2 tablet, Oral, BID  sodium chloride, 10 mL, Intravenous, Q12H  tamsulosin, 0.4 mg, Oral, Daily      Continuous Infusions:   PRN Meds:.  senna-docusate sodium **AND** polyethylene glycol **AND** bisacodyl **AND** bisacodyl    HYDROcodone-acetaminophen    HYDROmorphone **AND** naloxone    hyoscyamine    Magnesium Standard Dose Replacement - Follow Nurse / BPA Driven Protocol    nitroglycerin    Potassium Replacement - Follow Nurse / BPA Driven  Protocol    Insert Peripheral IV **AND** sodium chloride    sodium chloride    sodium chloride    Assessment & Plan   Assessment & Plan     Active Hospital Problems    Diagnosis  POA    **Flank pain [R10.9]  Yes    UTI (urinary tract infection), bacterial [N39.0, A49.9]  Yes    Prostate cancer [C61]  Yes    History of cerebrovascular accident [Z86.73]  Not Applicable    A-fib [I48.91]  Yes    Benign essential hypertension [I10]  Yes      Resolved Hospital Problems   No resolved problems to display.        Brief Hospital Course to date:  Vishal Dejesus is a 74 y.o. male with PMH significant for BPH, high-risk prostate cancer, chronic bladder outlet obstruction with severe urinary retention (self-caths), recurrent UTIs (E.coli), diverticulosis, paroxysmal a-fib chronically anticoagulated on Xarelto, CVA, HTN, HLD who presented to [facility] for right flank pain and transferred to [facility] for hospital admission. Patient follows with Dr. Nguyen with urology and was recently seen 4/7/2025 for ongoing rectal pain, more frequent need for self-caths, and purulent urine. Found to have Klebsiella UTI on culture and started on Bactrim 4/9. Reports above symptoms have been worse since radiation (Dr. Schwartz), which finished 3/28.      Klebsiella UTI, failed outpatient PO antibiotics  Urinary retention s/p anchored clay   Prostate cancer with CyberKnife radiation  Constipation and rectal pain   Concern for interstitial cystitis and radiation proctitis   pt notes increased symptoms since getting cyberknife tx  Urine culture from 4/7 grew Klebsiella, started on Bactrim (susceptible)  UA on admission with hematuria, WBCs too numerous to count.  CT showing changes of cystitis.    Urology and Radiation Oncology consulted  Clay placed by Urology this morning 4/15   Stated on Prednisone 20 mg daily 4/15/25, tentative plan for at least 2 weeks  Continue tamsulosin (formulary substitute for silodosin), oxybutinin (substitute for  home [place]ib[place]), currently has Rubio catheter in place  Strict I's & O's, monitor UOP  Scheduled bowel regimen, avoid constipation  Pain control with prn IV Dilaudid and Norco 7.5  IV NS @ 75 ml/hr x 12 hrs, creatinine 1.25 which is increased from prior  Started on IV Rocephin in ED - Continue, follow up urine culture     A-fib  H/o ablation, pt reports he has gone back into a-fib recently, follows with Dr. Middleton  Continue Xarelto  Continue metoprolol XL 25 mg daily and diltiazem      HTN  HLD  Hold home losartan, currently normotensive     H/o CVA  Xarelto      Expected Discharge Location and Transportation:   Expected Discharge   Expected Discharge Date: 4/16/2025; Expected Discharge Time:      VTE Prophylaxis:  Pharmacologic & mechanical VTE prophylaxis orders are present.         AM-PAC 6 Clicks Score (PT): 22 (04/14/25 2000)    CODE STATUS:   Code Status and Medical Interventions: CPR (Attempt to Resuscitate); Full Support   Ordered at: 04/14/25 1806     Code Status (Patient has no pulse and is not breathing):    CPR (Attempt to Resuscitate)     Medical Interventions (Patient has pulse or is breathing):    Full Support     Level Of Support Discussed With:    Patient       Chauncey Khan MD  04/15/25       Attending Attestation         I have performed an independent face-to-face diagnostic evaluation including performing an independent physical examination.  The documented plan of care above was reviewed and developed with Dr. Khan,  resident, who performed portions of the examination and documentation for this patient's care under my direct supervision.       Brief HPI  No acute events overnight, patient says he feels better,  has not had a BM in 2 days     Attending Physical Exam:  Temp:  [97.5 °F (36.4 °C)-97.9 °F (36.6 °C)] 97.5 °F (36.4 °C)  Heart Rate:  [50-91] 63  Resp:  [16-18] 18  BP: (104-152)/(67-91) 120/91  Constitutional: No acute distress, awake, alert  HENT: NCAT, mucous membranes  moist  Respiratory: Clear to auscultation bilaterally, respiratory effort normal   Cardiovascular: RRR, no murmurs, rubs, or gallops  Gastrointestinal: Positive bowel sounds, soft, nontender, nondistended  Musculoskeletal: No bilateral ankle edema  Psychiatric: Appropriate affect, cooperative  Neurologic: Oriented x 3, non-focal  Skin: No rashes      Assessment and Plan:     See assessment and plan documented by resident above and updated/edited by me as appropriate.     Yana Rodrigues MD  04/15/25

## 2025-04-15 NOTE — PLAN OF CARE
Problem: Adult Inpatient Plan of Care  Goal: Plan of Care Review  Outcome: Progressing  Goal: Patient-Specific Goal (Individualized)  Outcome: Progressing  Goal: Absence of Hospital-Acquired Illness or Injury  Outcome: Progressing  Intervention: Identify and Manage Fall Risk  Recent Flowsheet Documentation  Taken 4/15/2025 0200 by Tamara Mcnally RN  Safety Promotion/Fall Prevention: activity supervised  Taken 4/15/2025 0000 by Tamara Mcnally RN  Safety Promotion/Fall Prevention: activity supervised  Taken 4/14/2025 2200 by Tamara Mcnally RN  Safety Promotion/Fall Prevention: activity supervised  Taken 4/14/2025 2000 by Tamara Mcnally RN  Safety Promotion/Fall Prevention: activity supervised  Intervention: Prevent Skin Injury  Recent Flowsheet Documentation  Taken 4/15/2025 0200 by Tamara Mcnally RN  Body Position: position changed independently  Taken 4/15/2025 0000 by Tamara Mcnally RN  Body Position: position changed independently  Taken 4/14/2025 2200 by Tamara Mcnally RN  Body Position: position changed independently  Taken 4/14/2025 2000 by Tamara Mcnally RN  Body Position: position changed independently  Intervention: Prevent and Manage VTE (Venous Thromboembolism) Risk  Recent Flowsheet Documentation  Taken 4/14/2025 2000 by Tamara Mcnally RN  VTE Prevention/Management: (see MAR) other (see comments)  Goal: Optimal Comfort and Wellbeing  Outcome: Progressing  Intervention: Monitor Pain and Promote Comfort  Recent Flowsheet Documentation  Taken 4/14/2025 2223 by Tamara Mcnally RN  Pain Management Interventions: pain medication given  Taken 4/14/2025 2105 by Tamara Mcnally RN  Pain Management Interventions: pain medication given  Intervention: Provide Person-Centered Care  Recent Flowsheet Documentation  Taken 4/14/2025 2000 by Tamara Mcnally RN  Trust Relationship/Rapport:   care explained   choices provided  Goal: Readiness for Transition of Care  Outcome: Progressing     Problem: Comorbidity Management  Goal:  Maintenance of COPD Symptom Control  Outcome: Progressing  Intervention: Maintain COPD (Chronic Obstructive Pulmonary Disease) Symptom Control  Recent Flowsheet Documentation  Taken 4/15/2025 0200 by Tamara Mcnally RN  Medication Review/Management: medications reviewed  Taken 4/15/2025 0000 by Tamara Mcnally RN  Medication Review/Management: medications reviewed  Taken 4/14/2025 2200 by Tamara Mcnally RN  Medication Review/Management: medications reviewed  Taken 4/14/2025 2000 by Tamara Mcnally RN  Medication Review/Management: medications reviewed  Goal: Blood Pressure in Desired Range  Outcome: Progressing  Intervention: Maintain Blood Pressure Management  Recent Flowsheet Documentation  Taken 4/15/2025 0200 by Tamara Mcnally RN  Medication Review/Management: medications reviewed  Taken 4/15/2025 0000 by Tamara Mcnally RN  Medication Review/Management: medications reviewed  Taken 4/14/2025 2200 by Tamara Mcnally RN  Medication Review/Management: medications reviewed  Taken 4/14/2025 2000 by Tamara Mcnally RN  Medication Review/Management: medications reviewed   Goal Outcome Evaluation:   -VSS, RA  -Patients pain continues- PRN medications given for pain  -Normal Saline infusing at 75 mL/hr  -Pt continues self (in and out) catheterization   -Pt resting comfortably throughout the night  -No events throughout the night

## 2025-04-15 NOTE — CONSULTS
[facility]   HISTORY AND PHYSICAL    Patient Name: Vishal Dejesus  : 1950  MRN: [id number]  Primary Care Physician:  Orlando Curtis MD  Date of admission: 2025    Subjective   Subjective     Chief Complaint: Right flank pain    HPI:    Vishal Dejesus is a 74 y.o. male with history of BPH, urinary retention requiring self intermittent catheterization, Prostate cancer s/p Cyberknife radiation, recurrent UTI, Atrial Fibrillation managed on Xarelto, HTN, and HLD who presented to [facility] for worsening right flank pain. Of note; he recently saw Dr. Nguyen in office on 2025 with worsening rectal pain, decreased p.o. intake and weight loss.  Urine was sent for culture at this time which resulted with Klebsiella pneumoniae.  He was started on 10-day course of Bactrim.  He was also given a steroid for inflammation from radiation.  His pain worsened which prompted his presentation to [facility] ED. He was transferred to [facility] for further management.      CT AP w/contrast ; No hydronephrosis or pyelonephritis. There is a large right renal cyst and urinary bladder with cystitis. CT personally reviewed and discussed with Dr. Nguyen.     Labs ; Creatinine 1.14, WBC 6.11. UA micro with TNTC WBC, Trace bacteria, 21-50 RBC. Urine and blood cultures pending. He is afebrile.     He is currently sitting up in chair, he reports right flank pain has mildly improved. He is performing CIC 4 times daily without difficulty or hematuria. He reports continued intermittent rectal pain.     Review of Systems   All systems were reviewed and negative except for: right flank pain, rectal pain, urinary retention.     Personal History     Past Medical History:   Diagnosis Date    A-fib     Arthritis 2013    Benign prostatic hyperplasia     Coronary artery disease 2017    Elevated PSA 24    See MRI    History of medical problems     Heart Ablasion      Hyperlipidemia     Hypertension     Obesity     Prostate cancer     Completed cyber knife    Urinary tract infection     Treated       Past Surgical History:   Procedure Laterality Date    APPENDECTOMY  1986    Knee repacement left 2018    CARDIAC ABLATION  2013    CARDIAC SURGERY  2013    Ablasion    COLONOSCOPY  2021    EYE SURGERY  2020    Detached Retina.   Catarac removed.    GOLD SEED FIDUCIAL PLACEMENT N/A 03/07/2025    Procedure: TRANSPERINEAL GOLD FIDUCIAL MARKER PLACEMENT;  Surgeon: Jeronimo Schwartz MD;  Location: [facility];  Service: Radiation Oncology;  Laterality: N/A;    JOINT REPLACEMENT  11/2018    Still hurts    KNEE SURGERY Left     PROSTATE SURGERY  3-2025    Cyber knife complete 3-    SEPTOPLASTY      VASECTOMY  2001    No       Family History: family history includes Diabetes in his sister; Hypertension in his mother and sister; Stroke in his mother. Otherwise pertinent FHx was reviewed and not pertinent to current issue.    Social History:  reports that he has never smoked. He has never used smokeless tobacco. He reports current alcohol use of about 10.0 standard drinks of alcohol per week. He reports that he does not use drugs.    Home Medications:  Melatonin, Vibegron, dilTIAZem CD, hyoscyamine, losartan, methylPREDNISolone, metoprolol succinate XL, rivaroxaban, silodosin, and sulfamethoxazole-trimethoprim      Allergies:  Allergies   Allergen Reactions    Zosyn [Piperacillin-Tazobactam In Dex] Shortness Of Breath     Has tolerated Cefepime  Inpatient reaction 12/19: throat swelling and SOA req trx.       Objective   Objective     Vitals:   Temp:  [97.5 °F (36.4 °C)-97.9 °F (36.6 °C)] 97.7 °F (36.5 °C)  Heart Rate:  [54-91] 63  Resp:  [16-18] 16  BP: (104-152)/(67-84) 115/81  Physical Exam    Constitutional: Awake in bed, alert    Eyes: PERRLA, sclerae anicteric, no conjunctival injection   HENT: Normocephalic, atraumatic, mucous membranes moist   Neck: Supple,  trachea midline   Respiratory:Equal chest rise   Cardiovascular: RRR   Gastrointestinal: Soft, rectal pain   Musculoskeletal: No bilateral ankle edema   Genitourinary: self intermittent catheterization, right flank pain, uncircumcised.   Psychiatric: Appropriate affect, cooperative   Neurologic: Oriented x 3, Cranial Nerves grossly intact, speech clear   Skin: No rashes     Result Review    Result Review:  I have personally reviewed the results from the time of this admission to 4/15/2025 08:43 EDT and agree with these findings:  [x]  Laboratory  [x]  Microbiology  [x]  Radiology  []  EKG/Telemetry   []  Cardiology/Vascular   []  Pathology  [x]  Old records  [x]  Other: vital signs    Most notable findings include: CT AP w/contrast; cystitis of urinary bladder, no hydronephrosis or pyelonephritis.     Assessment & Plan   Assessment / Plan     Brief Patient Summary:  Vishal Dejesus is a 74 y.o. male who presented to [facility] as transfer from [facility] for right flank pain and rectal pain.  He recently completed Cyberknife radiation for prostate cancer at the end of March and reports worsening symptoms since then. Urine culture 04/07; Klebsiella Pneumoniae.     At the bedside, using sterile technique I was able to advance a 2 way 16 coudé catheter without incident.  There was immediate return of 200 cc orange urine output.  The Clay catheter was secured with 10 cc sterile water and attached to gravity bag drainage.  Patient tolerated the procedure well.    Active Hospital Problems:  Active Hospital Problems    Diagnosis     **Flank pain     UTI (urinary tract infection), bacterial     Prostate cancer     History of cerebrovascular accident     A-fib     Benign essential hypertension      Plan:   -Continue abx, cultures pending.   -Keep indwelling clay catheter in place.    will follow, please call if any questions.   D/w Dr. Nguyen.     VTE Prophylaxis:  Pharmacologic &  mechanical VTE prophylaxis orders are present.        CODE STATUS:    Code Status (Patient has no pulse and is not breathing): CPR (Attempt to Resuscitate)  Medical Interventions (Patient has pulse or is breathing): Full Support  Level Of Support Discussed With: Patient    Admission Status:  I believe this patient meets inpatient status.    Electronically signed by DEWEY Franco, 04/15/25, 8:43 AM EDT.

## 2025-04-15 NOTE — PLAN OF CARE
Problem: Adult Inpatient Plan of Care  Goal: Plan of Care Review  Outcome: Progressing  Flowsheets (Taken 4/15/2025 1730)  Outcome Evaluation: VSS on RA, A&O x4. Rubio placed this AM by NP with tea/pink-colored urine output. No other complaints at this time.  Goal: Patient-Specific Goal (Individualized)  Outcome: Progressing  Goal: Absence of Hospital-Acquired Illness or Injury  Outcome: Progressing  Intervention: Identify and Manage Fall Risk  Recent Flowsheet Documentation  Taken 4/15/2025 1600 by Sid Stewart RN  Safety Promotion/Fall Prevention:   activity supervised   assistive device/personal items within reach   clutter free environment maintained   room organization consistent   safety round/check completed  Taken 4/15/2025 1400 by Sid Stewart RN  Safety Promotion/Fall Prevention:   activity supervised   assistive device/personal items within reach   clutter free environment maintained   safety round/check completed   room organization consistent  Taken 4/15/2025 1200 by Sid Stewart RN  Safety Promotion/Fall Prevention:   activity supervised   assistive device/personal items within reach   clutter free environment maintained   safety round/check completed   room organization consistent  Intervention: Prevent Skin Injury  Recent Flowsheet Documentation  Taken 4/15/2025 1600 by Sid Stewart RN  Body Position: position changed independently  Skin Protection: incontinence pads utilized  Taken 4/15/2025 1400 by Sid Stewart RN  Body Position: position changed independently  Skin Protection: incontinence pads utilized  Taken 4/15/2025 1200 by Sid Stewart RN  Body Position: position changed independently  Skin Protection: incontinence pads utilized  Intervention: Prevent and Manage VTE (Venous Thromboembolism) Risk  Recent Flowsheet Documentation  Taken 4/15/2025 1200 by Sid Stewart RN  VTE Prevention/Management: (see MAR)   bilateral   SCDs (sequential  compression devices) off   other (see comments)  Intervention: Prevent Infection  Recent Flowsheet Documentation  Taken 4/15/2025 1600 by Sid Stewart RN  Infection Prevention: environmental surveillance performed  Taken 4/15/2025 1400 by Sid Stewart RN  Infection Prevention: environmental surveillance performed  Taken 4/15/2025 1200 by Sid Stewart RN  Infection Prevention:   rest/sleep promoted   single patient room provided  Goal: Optimal Comfort and Wellbeing  Outcome: Progressing  Intervention: Monitor Pain and Promote Comfort  Recent Flowsheet Documentation  Taken 4/15/2025 1144 by Sid Stewart RN  Pain Management Interventions: pain medication given  Intervention: Provide Person-Centered Care  Recent Flowsheet Documentation  Taken 4/15/2025 1200 by Sid Stewart RN  Trust Relationship/Rapport:   care explained   questions encouraged   questions answered  Goal: Readiness for Transition of Care  Outcome: Progressing     Problem: Comorbidity Management  Goal: Maintenance of COPD Symptom Control  Outcome: Progressing  Intervention: Maintain COPD (Chronic Obstructive Pulmonary Disease) Symptom Control  Recent Flowsheet Documentation  Taken 4/15/2025 1200 by Sid Stewart RN  Medication Review/Management: medications reviewed  Goal: Blood Pressure in Desired Range  Outcome: Progressing  Intervention: Maintain Blood Pressure Management  Recent Flowsheet Documentation  Taken 4/15/2025 1200 by Sid Stewart RN  Medication Review/Management: medications reviewed     Problem: Fall Injury Risk  Goal: Absence of Fall and Fall-Related Injury  Outcome: Progressing  Intervention: Identify and Manage Contributors  Recent Flowsheet Documentation  Taken 4/15/2025 1200 by Sid Stewart RN  Medication Review/Management: medications reviewed  Intervention: Promote Injury-Free Environment  Recent Flowsheet Documentation  Taken 4/15/2025 1600 by Sid Stewart RN  Safety  Promotion/Fall Prevention:   activity supervised   assistive device/personal items within reach   clutter free environment maintained   room organization consistent   safety round/check completed  Taken 4/15/2025 1400 by Sid Stewart RN  Safety Promotion/Fall Prevention:   activity supervised   assistive device/personal items within reach   clutter free environment maintained   safety round/check completed   room organization consistent  Taken 4/15/2025 1200 by Sid Stewart, RN  Safety Promotion/Fall Prevention:   activity supervised   assistive device/personal items within reach   clutter free environment maintained   safety round/check completed   room organization consistent   Goal Outcome Evaluation:              Outcome Evaluation: VSS on RA, A&O x4. Rubio placed this AM by NP with tea/pink-colored urine output. No other complaints at this time.

## 2025-04-16 PROBLEM — N39.0 UTI (URINARY TRACT INFECTION): Status: ACTIVE | Noted: 2025-04-16

## 2025-04-16 LAB
ANION GAP SERPL CALCULATED.3IONS-SCNC: 11 MMOL/L (ref 5–15)
BACTERIA SPEC AEROBE CULT: NO GROWTH
BASOPHILS # BLD AUTO: 0.02 10*3/MM3 (ref 0–0.2)
BASOPHILS NFR BLD AUTO: 0.3 % (ref 0–1.5)
BUN SERPL-MCNC: 13 MG/DL (ref 8–23)
BUN/CREAT SERPL: 16 (ref 7–25)
CALCIUM SPEC-SCNC: 8.6 MG/DL (ref 8.6–10.5)
CHLORIDE SERPL-SCNC: 103 MMOL/L (ref 98–107)
CO2 SERPL-SCNC: 23 MMOL/L (ref 22–29)
CREAT SERPL-MCNC: 0.81 MG/DL (ref 0.76–1.27)
DEPRECATED RDW RBC AUTO: 46.5 FL (ref 37–54)
EGFRCR SERPLBLD CKD-EPI 2021: 92.5 ML/MIN/1.73
EOSINOPHIL # BLD AUTO: 0.09 10*3/MM3 (ref 0–0.4)
EOSINOPHIL NFR BLD AUTO: 1.4 % (ref 0.3–6.2)
ERYTHROCYTE [DISTWIDTH] IN BLOOD BY AUTOMATED COUNT: 13.7 % (ref 12.3–15.4)
GLUCOSE SERPL-MCNC: 122 MG/DL (ref 65–99)
HCT VFR BLD AUTO: 43.6 % (ref 37.5–51)
HGB BLD-MCNC: 14.6 G/DL (ref 13–17.7)
IMM GRANULOCYTES # BLD AUTO: 0.06 10*3/MM3 (ref 0–0.05)
IMM GRANULOCYTES NFR BLD AUTO: 0.9 % (ref 0–0.5)
LYMPHOCYTES # BLD AUTO: 0.97 10*3/MM3 (ref 0.7–3.1)
LYMPHOCYTES NFR BLD AUTO: 15.2 % (ref 19.6–45.3)
MCH RBC QN AUTO: 31.3 PG (ref 26.6–33)
MCHC RBC AUTO-ENTMCNC: 33.5 G/DL (ref 31.5–35.7)
MCV RBC AUTO: 93.6 FL (ref 79–97)
MONOCYTES # BLD AUTO: 0.44 10*3/MM3 (ref 0.1–0.9)
MONOCYTES NFR BLD AUTO: 6.9 % (ref 5–12)
NEUTROPHILS NFR BLD AUTO: 4.82 10*3/MM3 (ref 1.7–7)
NEUTROPHILS NFR BLD AUTO: 75.3 % (ref 42.7–76)
NRBC BLD AUTO-RTO: 0 /100 WBC (ref 0–0.2)
PLATELET # BLD AUTO: 132 10*3/MM3 (ref 140–450)
PMV BLD AUTO: 9.6 FL (ref 6–12)
POTASSIUM SERPL-SCNC: 4.1 MMOL/L (ref 3.5–5.2)
RBC # BLD AUTO: 4.66 10*6/MM3 (ref 4.14–5.8)
SODIUM SERPL-SCNC: 137 MMOL/L (ref 136–145)
WBC NRBC COR # BLD AUTO: 6.4 10*3/MM3 (ref 3.4–10.8)

## 2025-04-16 PROCEDURE — 99232 SBSQ HOSP IP/OBS MODERATE 35: CPT | Performed by: INTERNAL MEDICINE

## 2025-04-16 PROCEDURE — 25010000002 HYDROMORPHONE 1 MG/ML SOLUTION: Performed by: INTERNAL MEDICINE

## 2025-04-16 PROCEDURE — 25010000002 CEFTRIAXONE PER 250 MG: Performed by: NURSE PRACTITIONER

## 2025-04-16 PROCEDURE — 63710000001 PREDNISONE PER 1 MG: Performed by: RADIOLOGY

## 2025-04-16 PROCEDURE — 99233 SBSQ HOSP IP/OBS HIGH 50: CPT | Performed by: NURSE PRACTITIONER

## 2025-04-16 PROCEDURE — 80048 BASIC METABOLIC PNL TOTAL CA: CPT

## 2025-04-16 PROCEDURE — 85025 COMPLETE CBC W/AUTO DIFF WBC: CPT

## 2025-04-16 RX ADMIN — PENTOSAN POLYSULFATE SODIUM 100 MG: 100 CAPSULE, GELATIN COATED ORAL at 12:15

## 2025-04-16 RX ADMIN — PENTOSAN POLYSULFATE SODIUM 100 MG: 100 CAPSULE, GELATIN COATED ORAL at 18:01

## 2025-04-16 RX ADMIN — PHENAZOPYRIDINE 200 MG: 100 TABLET ORAL at 10:04

## 2025-04-16 RX ADMIN — Medication 5 MG: at 21:17

## 2025-04-16 RX ADMIN — PREDNISONE 20 MG: 20 TABLET ORAL at 10:06

## 2025-04-16 RX ADMIN — RIVAROXABAN 20 MG: 20 TABLET, FILM COATED ORAL at 10:05

## 2025-04-16 RX ADMIN — SENNOSIDES, DOCUSATE SODIUM 2 TABLET: 50; 8.6 TABLET, FILM COATED ORAL at 10:05

## 2025-04-16 RX ADMIN — HYDROMORPHONE HYDROCHLORIDE 1 MG: 1 INJECTION, SOLUTION INTRAMUSCULAR; INTRAVENOUS; SUBCUTANEOUS at 21:17

## 2025-04-16 RX ADMIN — OXYBUTYNIN CHLORIDE 10 MG: 10 TABLET, EXTENDED RELEASE ORAL at 10:06

## 2025-04-16 RX ADMIN — Medication 10 ML: at 10:06

## 2025-04-16 RX ADMIN — TAMSULOSIN HYDROCHLORIDE 0.4 MG: 0.4 CAPSULE ORAL at 10:06

## 2025-04-16 RX ADMIN — PHENAZOPYRIDINE 200 MG: 100 TABLET ORAL at 12:15

## 2025-04-16 RX ADMIN — SODIUM CHLORIDE 2000 MG: 9 INJECTION, SOLUTION INTRAVENOUS at 18:01

## 2025-04-16 RX ADMIN — PENTOSAN POLYSULFATE SODIUM 100 MG: 100 CAPSULE, GELATIN COATED ORAL at 10:04

## 2025-04-16 NOTE — CASE MANAGEMENT/SOCIAL WORK
Continued Stay Note   Jaylene     Patient Name: Vishal Dejesus  MRN: [id number]  Today's Date: 4/16/2025    Admit Date: 4/14/2025    Plan: home   Discharge Plan       Row Name 04/16/25 0829       Plan    Plan home    Patient/Family in Agreement with Plan yes    Plan Comments I met with this patient bedside. His plan remains home with his wife to transport. He remains on antibiotic and has a clay in place. CM to follow.    Final Discharge Disposition Code 01 - home or self-care                   Discharge Codes    No documentation.                 Expected Discharge Date and Time       Expected Discharge Date Expected Discharge Time    Apr 16, 2025               Danielle Lundberg RN

## 2025-04-16 NOTE — PLAN OF CARE
Problem: Adult Inpatient Plan of Care  Goal: Plan of Care Review  Outcome: Progressing  Goal: Patient-Specific Goal (Individualized)  Outcome: Progressing  Goal: Absence of Hospital-Acquired Illness or Injury  Outcome: Progressing  Intervention: Identify and Manage Fall Risk  Recent Flowsheet Documentation  Taken 4/16/2025 0200 by Tamara Mcnally RN  Safety Promotion/Fall Prevention: activity supervised  Taken 4/16/2025 0000 by Tamara Mcnally RN  Safety Promotion/Fall Prevention: activity supervised  Taken 4/15/2025 2200 by Tamara Mcnally RN  Safety Promotion/Fall Prevention: activity supervised  Taken 4/15/2025 2000 by Tamara Mcnally RN  Safety Promotion/Fall Prevention: activity supervised  Intervention: Prevent Skin Injury  Recent Flowsheet Documentation  Taken 4/16/2025 0200 by Tamara Mcnally RN  Body Position: position changed independently  Taken 4/16/2025 0000 by Tamara Mcnally RN  Body Position: position changed independently  Taken 4/15/2025 2200 by Tamara Mcnally RN  Body Position: position changed independently  Taken 4/15/2025 2000 by Tamara Mcnally RN  Body Position: position changed independently  Intervention: Prevent and Manage VTE (Venous Thromboembolism) Risk  Recent Flowsheet Documentation  Taken 4/15/2025 2000 by Tamara Mcnally RN  VTE Prevention/Management: SCDs (sequential compression devices) off  Goal: Optimal Comfort and Wellbeing  Outcome: Progressing  Intervention: Monitor Pain and Promote Comfort  Recent Flowsheet Documentation  Taken 4/15/2025 2034 by Tamara Mcnally RN  Pain Management Interventions: pain medication given  Intervention: Provide Person-Centered Care  Recent Flowsheet Documentation  Taken 4/15/2025 2000 by Tamara Mcnally RN  Trust Relationship/Rapport:   care explained   choices provided  Goal: Readiness for Transition of Care  Outcome: Progressing     Problem: Comorbidity Management  Goal: Maintenance of COPD Symptom Control  Outcome: Progressing  Intervention: Maintain COPD  (Chronic Obstructive Pulmonary Disease) Symptom Control  Recent Flowsheet Documentation  Taken 4/16/2025 0200 by Tamara Mcnally RN  Medication Review/Management: medications reviewed  Taken 4/16/2025 0000 by Tamara Mcnally RN  Medication Review/Management: medications reviewed  Taken 4/15/2025 2200 by Tamara Mcnally RN  Medication Review/Management: medications reviewed  Taken 4/15/2025 2000 by Tamara Mcnally RN  Medication Review/Management: medications reviewed  Goal: Blood Pressure in Desired Range  Outcome: Progressing  Intervention: Maintain Blood Pressure Management  Recent Flowsheet Documentation  Taken 4/16/2025 0200 by Tamara Mcnally RN  Medication Review/Management: medications reviewed  Taken 4/16/2025 0000 by Tamara Mcnally RN  Medication Review/Management: medications reviewed  Taken 4/15/2025 2200 by Tamara Mcnally RN  Medication Review/Management: medications reviewed  Taken 4/15/2025 2000 by Tamara Mcnally RN  Medication Review/Management: medications reviewed     Problem: Fall Injury Risk  Goal: Absence of Fall and Fall-Related Injury  Outcome: Progressing  Intervention: Identify and Manage Contributors  Recent Flowsheet Documentation  Taken 4/16/2025 0200 by Tamara Mcnally RN  Medication Review/Management: medications reviewed  Taken 4/16/2025 0000 by Tamara Mcnally RN  Medication Review/Management: medications reviewed  Taken 4/15/2025 2200 by Tamara Mcnally RN  Medication Review/Management: medications reviewed  Taken 4/15/2025 2000 by Tamara Mcnally RN  Medication Review/Management: medications reviewed  Intervention: Promote Injury-Free Environment  Recent Flowsheet Documentation  Taken 4/16/2025 0200 by Tamara Mcnally RN  Safety Promotion/Fall Prevention: activity supervised  Taken 4/16/2025 0000 by Tamara Mcnally RN  Safety Promotion/Fall Prevention: activity supervised  Taken 4/15/2025 2200 by Tamara Mcnally RN  Safety Promotion/Fall Prevention: activity supervised  Taken 4/15/2025 2000 by Tamara Mcnally  RN  Safety Promotion/Fall Prevention: activity supervised   Goal Outcome Evaluation:   SURJIT CALDERON  Pt is tolerating urinary catheter and is using only PO pain medications as of now. Pt rested well and comfortably throughout the night.

## 2025-04-16 NOTE — PROGRESS NOTES
[facility] Medicine Services  PROGRESS NOTE    Patient Name: Vishal Dejesus  : 1950  MRN: [id number]    Date of Admission: 2025  Primary Care Physician: Orlando Curtis MD    Subjective   Subjective     CC:  Follow up UTI     HPI:  No acute events overnight. Resting comfortable, endorsed improved of dysuria, anchored clay draining red-orange urine. Mentioned wife is out of town and does not have transportation.     Objective   Objective     Vital Signs:   Temp:  [97.5 °F (36.4 °C)-98 °F (36.7 °C)] 97.8 °F (36.6 °C)  Heart Rate:  [48-67] 51  Resp:  [18] 18  BP: (101-120)/(62-91) 101/62     Physical Exam:  Physical Exam  Constitutional:       Appearance: Normal appearance.   HENT:      Head: Normocephalic.      Mouth/Throat:      Mouth: Mucous membranes are moist.      Pharynx: Oropharynx is clear.   Eyes:      Extraocular Movements: Extraocular movements intact.   Cardiovascular:      Rate and Rhythm: Normal rate and regular rhythm.   Pulmonary:      Effort: Pulmonary effort is normal. No respiratory distress.   Abdominal:      General: There is no distension.      Palpations: Abdomen is soft.      Tenderness: There is no abdominal tenderness.   Musculoskeletal:      Right lower leg: No edema.      Left lower leg: No edema.   Skin:     General: Skin is warm.   Neurological:      Mental Status: He is alert and oriented to person, place, and time.   Psychiatric:         Mood and Affect: Mood normal.         Behavior: Behavior normal.          Results Reviewed:  LAB RESULTS:      Lab 04/15/25  1327 25  0958   WBC 5.99 6.11   HEMOGLOBIN 14.9 16.5   HEMATOCRIT 45.9 49.6   PLATELETS 120* 134*   NEUTROS ABS  --  4.91   IMMATURE GRANS (ABS)  --  0.08*   LYMPHS ABS  --  0.68*   MONOS ABS  --  0.38   EOS ABS  --  0.05   MCV 96.0 92.9   PROCALCITONIN  --  0.06   LACTATE  --  1.8         Lab 04/15/25  1213 25  0958   SODIUM 135* 138   POTASSIUM 4.3 4.0   CHLORIDE  103 103   CO2 22.0 23.5   ANION GAP 10.0 11.5   BUN 15 18   CREATININE 0.85 1.14   EGFR 91.2 67.5   GLUCOSE 108* 138*   CALCIUM 8.6 9.6         Lab 04/14/25  0958   TOTAL PROTEIN 6.1   ALBUMIN 3.7   GLOBULIN 2.4   ALT (SGPT) 50*   AST (SGOT) 38   BILIRUBIN 0.7   ALK PHOS 63                     Brief Urine Lab Results  (Last result in the past 365 days)        Color   Clarity   Blood   Leuk Est   Nitrite   Protein   CREAT   Urine HCG        04/14/25 1053 Yellow   Clear   Large (3+)   Trace   Negative   100 mg/dL (2+)                   Microbiology Results Abnormal       None            CT Abdomen Pelvis With Contrast  Result Date: 4/14/2025  CT ABDOMEN PELVIS W CONTRAST Date of Exam: 4/14/2025 11:12 AM EDT Indication: right flank pain, recent cystitis, worsening pain. Comparison: PET/CT 12/31/2024 and CT abdomen pelvis 12/19/2024 Technique: Axial CT images were obtained of the abdomen and pelvis following the uneventful intravenous administration of 85 mL Isovue-300. Reconstructed coronal and sagittal images were also obtained. Automated exposure control and iterative construction methods were used. Findings: LUNG BASES:  Unremarkable without mass or infiltrate. LIVER: The liver has decreased attenuation compatible with steatosis. There is a cyst in the right hepatic lobe measuring 1.7 cm. BILIARY/GALLBLADDER:  Unremarkable SPLEEN:  Unremarkable PANCREAS: There is stable appearance to a 1.8 x 2.8 cm low attenuating lesion adjacent to the pancreatic head (image 44 of series 2). This measures water density and may represent an exophytic pancreatic cyst. ADRENAL:  Unremarkable KIDNEYS: Evaluation of the right kidney demonstrates an exophytic cyst along the superior pole measuring 8 cm. No evidence for hydronephrosis or hydroureter. The left kidney is uniform in appearance without hydronephrosis or hydroureter. No evidence for nephrolithiasis. GASTROINTESTINAL/MESENTERY: Evaluation of the gastrointestinal tract  demonstrates diverticulosis without CT evidence for acute diverticulitis. No inflammatory changes in the right lower quadrant. Spacer device is noted interposed between the anterior margin of the sigmoid and posterior margin of the prostate. MESENTERIC VESSELS:  Patent. AORTA/IVC:  Normal caliber. RETROPERITONEUM/LYMPH NODES: There is stable appearance to a lymph node adjacent to the left external iliac chain measuring 9 mm in short axis (image 109 of series 2). A lymph node is noted along the right external iliac chain also measuring 9 mm (image 112). REPRODUCTIVE: The prostate demonstrates fiduciary markers measuring 5.4 cm transversely. BLADDER: The urinary bladder is decompressed but demonstrates prominent wall thickening measuring up to 2.4 cm in diameter. There is adjacent inflammatory fat stranding compatible with cystitis. A diverticulum is noted along the right superior lateral margin. OSSEUS STRUCTURES:  Typical for age with no acute process identified.     Impression: Impression: Urinary bladder wall thickening with inflammatory fat stranding compatible with cystitis. Diverticulosis. Stable appearance to pancreatic cyst measuring 2.8 cm. Electronically Signed: Mitzi Gilliam MD  4/14/2025 11:42 AM EDT  Workstation ID: [id number]          Current medications:  Scheduled Meds:cefTRIAXone, 2,000 mg, Intravenous, Q24H  dilTIAZem CD, 180 mg, Oral, Daily  [Held by provider] losartan, 50 mg, Oral, Daily  melatonin, 5 mg, Oral, Nightly  metoprolol succinate XL, 25 mg, Oral, Daily  oxybutynin XL, 10 mg, Oral, Daily  pentosan polysulfate, 100 mg, Oral, TID AC  phenazopyridine, 200 mg, Oral, TID With Meals  predniSONE, 20 mg, Oral, Daily  rivaroxaban, 20 mg, Oral, Daily  senna-docusate sodium, 2 tablet, Oral, BID  sodium chloride, 10 mL, Intravenous, Q12H  tamsulosin, 0.4 mg, Oral, Daily      Continuous Infusions:   PRN Meds:.  senna-docusate sodium **AND** polyethylene glycol **AND** bisacodyl **AND** bisacodyl     HYDROcodone-acetaminophen    HYDROmorphone **AND** naloxone    hyoscyamine    Magnesium Standard Dose Replacement - Follow Nurse / BPA Driven Protocol    nitroglycerin    Potassium Replacement - Follow Nurse / BPA Driven Protocol    Insert Peripheral IV **AND** sodium chloride    sodium chloride    sodium chloride    Assessment & Plan   Assessment & Plan     Active Hospital Problems    Diagnosis  POA    **Flank pain [R10.9]  Yes    UTI (urinary tract infection) [N39.0]  Yes    UTI (urinary tract infection), bacterial [N39.0, A49.9]  Yes    Prostate cancer [C61]  Yes    History of cerebrovascular accident [Z86.73]  Not Applicable    A-fib [I48.91]  Yes    Benign essential hypertension [I10]  Yes      Resolved Hospital Problems   No resolved problems to display.        Brief Hospital Course to date:  Vishal Dejesus is a 74 y.o. male with PMH significant for BPH, high-risk prostate cancer, chronic bladder outlet obstruction with severe urinary retention (self-caths), recurrent UTIs (E.coli), diverticulosis, paroxysmal a-fib chronically anticoagulated on Xarelto, CVA, HTN, HLD who presented to [facility] for right flank pain and transferred to [facility] for hospital admission. Patient follows with Dr. Nguyen with urology and was recently seen 4/7/2025 for ongoing rectal pain, more frequent need for self-caths, and purulent urine. Found to have Klebsiella UTI on culture and started on Bactrim 4/9. Reports above symptoms have been worse since radiation (Dr. Schwartz), which finished 3/28.      Klebsiella UTI, failed outpatient PO antibiotics  Urinary retention s/p anchored clay   Prostate cancer with CyberKnife radiation  Constipation and rectal pain   Concern for interstitial cystitis and radiation proctitis   pt notes increased symptoms since getting cyberknife tx  Urine culture from 4/7 grew Klebsiella, started on Bactrim (susceptible)  UA on admission with hematuria, WBCs too numerous to count.  CT showing changes  of cystitis.    Received NS fluids   Urology and Radiation Oncology consulted  Clay placed by Urology morning of 4/15   Stated on Prednisone 20 mg daily 4/15/25, tentative plan for at least 2 weeks  Continue tamsulosin (formulary substitute for silodosin), oxybutinin (substitute for home Vibregon), currently has Clay catheter in place  Strict I's & O's, monitor UOP  Scheduled bowel regimen, avoid constipation  Pain control with prn IV Dilaudid and Norco 7.5  Started on IV Rocephin in ED - Continue, follow up urine culture, can likely adjust to PO on discharge, continue for one week per Urology recs   Keep indwelling clay today, to resume GIC at discharge per Urology   Outpatient follow up with Dr. Nguyen      A-fib  H/o ablation, pt reports he has gone back into a-fib recently, follows with Dr. Middleton  Continue Xarelto  Continue metoprolol XL 25 mg daily and diltiazem      HTN  Continue home losartan      H/o CVA  Xarelto      Expected Discharge Location and Transportation:   Expected Discharge   Expected Discharge Date: 4/16/2025; Expected Discharge Time:      VTE Prophylaxis:  Pharmacologic & mechanical VTE prophylaxis orders are present.         AM-PAC 6 Clicks Score (PT): 22 (04/15/25 2000)    CODE STATUS:   Code Status and Medical Interventions: CPR (Attempt to Resuscitate); Full Support   Ordered at: 04/14/25 1806     Code Status (Patient has no pulse and is not breathing):    CPR (Attempt to Resuscitate)     Medical Interventions (Patient has pulse or is breathing):    Full Support     Level Of Support Discussed With:    Patient       Chauncey Khan MD  04/16/25       Attending Attestation         I have performed an independent face-to-face diagnostic evaluation including performing an independent physical examination.  The documented plan of care above was reviewed and developed with Dr. Khan,  resident, who performed portions of the examination and documentation for this patient's care under  my direct supervision.       Brief HPI  Patient stated he slept well, has no new complaints, yet to have a BM.  However, clay output is now blood-tinged, denies any abdominal pain.     Attending Physical Exam:  Temp:  [97.5 °F (36.4 °C)-98 °F (36.7 °C)] 97.8 °F (36.6 °C)  Heart Rate:  [48-67] 51  Resp:  [18] 18  BP: (101-120)/(62-91) 101/62     Constitutional: Elderly male, in no acute distress, awake, alert  HENT: NCAT, mucous membranes moist  Respiratory: Clear to auscultation bilaterally, respiratory effort normal   Cardiovascular: RRR, no murmurs, rubs, or gallops  Gastrointestinal: Positive bowel sounds, soft, nontender, nondistended  : Clay catheter in place, output is blood-tinged  Musculoskeletal: No bilateral ankle edema  Psychiatric: Appropriate affect, cooperative  Neurologic: Oriented x 3, nonfocal     Assessment and Plan:     See assessment and plan documented by resident above and updated/edited by me as appropriate.     Yana Rodrigues MD  04/16/25

## 2025-04-16 NOTE — PROGRESS NOTES
[facility]   Urology Progress Note    Patient Name: Vishal Dejesus  : 1950  MRN: [id number]  Primary Care Physician:  Orlando Curtis MD  Date of admission: 2025    Subjective   Subjective     Chief Complaint: Right flank pain    HPI:  Patient resting in bed. He had a good night, clay catheter has been convenient for him. Clay catheter in place draining orange urine output. His right flank pain has completely resolved. Urine culture no growth. Blood culture NGTD. He is afebrile. He reports continued intermittent rectal pain.     Review of Systems   All systems were reviewed and negative except for: clay catheter    Objective   Objective     Vitals:   Temp:  [97.6 °F (36.4 °C)-98 °F (36.7 °C)] 97.6 °F (36.4 °C)  Heart Rate:  [46-67] 53  Resp:  [16-18] 16  BP: (101-116)/(62-79) 104/77  Physical Exam    Constitutional: Awake in bed, alert   Eyes: PERRLA, sclerae anicteric, no conjunctival injection   HENT: Normocephalic, atraumatic, mucous membranes moist   Neck: Supple, trachea midline   Respiratory: Equal chest rise   Cardiovascular: RRR   Gastrointestinal: Soft   Genitourinary: Uncircumcised penis. 2 way clay catheter with orange urine output.    Musculoskeletal: No lower extremity edema bilaterally, no clubbing or cyanosis to extremities   Psychiatric: Appropriate affect, cooperative   Neurologic: Oriented x 3,  Cranial Nerves grossly intact, speech clear   Skin: No rashes     Result Review    Result Review:  I have personally reviewed the results from the time of this admission to 2025 08:52 EDT and agree with these findings:  []  Laboratory  [x]  Microbiology  []  Radiology  []  EKG/Telemetry   []  Cardiology/Vascular   []  Pathology  []  Old records  [x]  Other: vital sign    Most notable findings include: Urine culture no growth.     Assessment & Plan   Assessment / Plan     Brief Patient Summary:  Vishal Dejesus is a 74 y.o. male who presented to [facility] transfer from [facility] for right flank pain and rectal pain.  He recently completed Cyberknife radiation for prostate cancer at the end of March and reports worsening symptoms since then. Urine culture 04/07; Klebsiella Pneumoniae.     Active Hospital Problems:  Active Hospital Problems    Diagnosis     **Flank pain     UTI (urinary tract infection)     UTI (urinary tract infection), bacterial     Prostate cancer     History of cerebrovascular accident     A-fib     Benign essential hypertension        Plan:   -Discontinue indwelling clay catheter. Will resume CIC at discharge.   -Continue antibiotics and steroids at discharge. Continue abx x1 week at discharge.   -Plan for scheduled outpatient follow up with Dr. Nguyen on 06/02/25.   D/w Dr. Nguyen.     VTE Prophylaxis:  Pharmacologic & mechanical VTE prophylaxis orders are present.      CODE STATUS:   Code Status (Patient has no pulse and is not breathing): CPR (Attempt to Resuscitate)  Medical Interventions (Patient has pulse or is breathing): Full Support  Level Of Support Discussed With: Patient    Disposition:  I expect patient to discharge home 04/16/25..    Electronically signed by DEWEY Franco, 04/16/25, 8:52 AM EDT.

## 2025-04-17 ENCOUNTER — READMISSION MANAGEMENT (OUTPATIENT)
Dept: CALL CENTER | Facility: HOSPITAL | Age: 75
End: 2025-04-17
Payer: MEDICARE

## 2025-04-17 VITALS
OXYGEN SATURATION: 95 % | HEART RATE: 73 BPM | BODY MASS INDEX: 36.57 KG/M2 | RESPIRATION RATE: 18 BRPM | HEIGHT: 71 IN | WEIGHT: 261.2 LBS | SYSTOLIC BLOOD PRESSURE: 155 MMHG | TEMPERATURE: 97.7 F | DIASTOLIC BLOOD PRESSURE: 102 MMHG

## 2025-04-17 PROCEDURE — 63710000001 PREDNISONE PER 1 MG: Performed by: RADIOLOGY

## 2025-04-17 PROCEDURE — 99239 HOSP IP/OBS DSCHRG MGMT >30: CPT | Performed by: INTERNAL MEDICINE

## 2025-04-17 RX ORDER — PREDNISONE 20 MG/1
20 TABLET ORAL DAILY
Qty: 14 TABLET | Refills: 0 | Status: SHIPPED | OUTPATIENT
Start: 2025-04-18 | End: 2025-05-02

## 2025-04-17 RX ORDER — SULFAMETHOXAZOLE AND TRIMETHOPRIM 800; 160 MG/1; MG/1
1 TABLET ORAL 2 TIMES DAILY
Qty: 14 TABLET | Refills: 0 | Status: SHIPPED | OUTPATIENT
Start: 2025-04-17 | End: 2025-04-24

## 2025-04-17 RX ORDER — FAMOTIDINE 20 MG/1
20 TABLET, FILM COATED ORAL DAILY
Qty: 14 TABLET | Refills: 0 | Status: SHIPPED | OUTPATIENT
Start: 2025-04-17 | End: 2025-05-01

## 2025-04-17 RX ADMIN — Medication 10 ML: at 08:33

## 2025-04-17 RX ADMIN — DILTIAZEM HYDROCHLORIDE 180 MG: 180 CAPSULE, EXTENDED RELEASE ORAL at 08:32

## 2025-04-17 RX ADMIN — RIVAROXABAN 20 MG: 20 TABLET, FILM COATED ORAL at 08:31

## 2025-04-17 RX ADMIN — PENTOSAN POLYSULFATE SODIUM 100 MG: 100 CAPSULE, GELATIN COATED ORAL at 08:32

## 2025-04-17 RX ADMIN — TAMSULOSIN HYDROCHLORIDE 0.4 MG: 0.4 CAPSULE ORAL at 08:32

## 2025-04-17 RX ADMIN — METOPROLOL SUCCINATE 25 MG: 25 TABLET, EXTENDED RELEASE ORAL at 08:31

## 2025-04-17 RX ADMIN — OXYBUTYNIN CHLORIDE 10 MG: 10 TABLET, EXTENDED RELEASE ORAL at 08:31

## 2025-04-17 RX ADMIN — SENNOSIDES, DOCUSATE SODIUM 2 TABLET: 50; 8.6 TABLET, FILM COATED ORAL at 08:31

## 2025-04-17 RX ADMIN — PREDNISONE 20 MG: 20 TABLET ORAL at 08:32

## 2025-04-17 NOTE — CASE MANAGEMENT/SOCIAL WORK
Continued Stay Note   [facility]     Patient Name: Vishal Dejesus  MRN: [id number]  Today's Date: 4/17/2025    Admit Date: 4/14/2025    Plan: home   Discharge Plan       Row Name 04/17/25 0851       Plan    Plan home    Patient/Family in Agreement with Plan yes    Plan Comments I met with this patient regarding his discharge home today. He is in agreement, and his wife can transport. He denies having any further discharge planning needs. The IMM form was completed and noted in EPIC.    Final Discharge Disposition Code 01 - home or self-care                   Discharge Codes    No documentation.                 Expected Discharge Date and Time       Expected Discharge Date Expected Discharge Time    Apr 17, 2025               Danielle Lundberg RN

## 2025-04-17 NOTE — DISCHARGE SUMMARY
[facility] Medicine Services  DISCHARGE SUMMARY    Patient Name: Vishal Dejesus  : 1950  MRN: [id number]    Date of Admission: 2025  9:44 AM  Date of Discharge:  2025  Primary Care Physician: Orlando Curtis MD      Consults       Date and Time Order Name Status Description    2025  6:06 PM Inpatient Radiation Oncology Consult      2025  6:06 PM Inpatient Urology Consult Completed             Hospital Course     Presenting Problem: Dysuria/flank pain     Active Hospital Problems    Diagnosis  POA    **Flank pain [R10.9]  Yes    UTI (urinary tract infection) [N39.0]  Yes    UTI (urinary tract infection), bacterial [N39.0, A49.9]  Yes    Prostate cancer [C61]  Yes    History of cerebrovascular accident [Z86.73]  Not Applicable    A-fib [I48.91]  Yes    Benign essential hypertension [I10]  Yes      Resolved Hospital Problems   No resolved problems to display.          Hospital Course:  Vishal Dejesus is a 74 y.o. male with PMH significant for BPH, high-risk prostate cancer, chronic bladder outlet obstruction with severe urinary retention (self-caths), recurrent UTIs, diverticulosis, paroxysmal a-fib chronically anticoagulated on Xarelto, CVA, HTN, HLD who presented to [facility] for right flank pain and transferred to [facility] for hospital admission.     Patient follows with Dr. Nguyen with urology and was recently seen 2025 for ongoing rectal pain, more frequent need for self-caths, and purulent urine. Found to have Klebsiella UTI on culture and started on Bactrim . Reports above symptoms have been worse since radiation (Dr. Schwartz), which finished 3/28.      Klebsiella UTI, resolved   Urinary retention s/p anchored clay  Prostate cancer with CyberKnife radiation  Constipation and rectal pain   Concern for interstitial cystitis and radiation proctitis   Presented with increased symptoms since getting cyberknife tx  Urine culture from   grew Klebsiella, started on Bactrim (susceptible) outpatient   UA on admission with hematuria, WBCs too numerous to count.    CT showing changes of cystitis.    Received NS fluids   Urology and Radiation Oncology consulted who recommended placing indwelling catheter and starting steroids to reduce inflammation  Clay placed by Urology morning of 4/15   Stated on Prednisone 20 mg daily 4/15/25, tentative plan for at least 2 weeks  Continued tamsulosin (formulary substitute for silodosin), oxybutinin (substitute for home Vibregon)  Started on IV Rocephin in ED - Continued inpatient and discharged with bactrim for 7 more days per Urology recs   Repeat infectious work up with blood culture and urine culture remained negative on discharge  Per Urology, indwelling clay catheter removed prior to discharge, pt to resume CIC at home. Has scheduled outpatient follow up with Dr. Nguyen on 06/02/25 or sooner as needed.   Discharged with 2 weeks of Prednisone 20 mg daily and Pepcid for gastric ulcer prophylaxis. Will be seen back in Rad Onc clinic in the next 2 weeks after discharge for re-evaluation of his symptoms and further management of steroid therapy.      A-fib  H/o ablation, follows with Dr. Mdidleton  Continued Xarelto  Continued metoprolol XL 25 mg daily and diltiazem      HTN  Continued home losartan      H/o CVA  Xarelto       Discharge Follow Up Recommendations for outpatient labs/diagnostics:  Continue antibiotic and steroid therapy as instructed   Continue close follow up with Urology and Radiation Oncology     Day of Discharge     HPI:   Seen laying comfortable in bed, denied any /GI pain, remains HD stable and afebrile, amenable for discharge with close outpatient follow up     Review of Systems   All other systems reviewed and are negative.      Vital Signs:   Temp:  [97.5 °F (36.4 °C)-98 °F (36.7 °C)] 97.7 °F (36.5 °C)  Heart Rate:  [43-79] 79  Resp:  [16-18] 18  BP: (125-155)/()  155/102      Physical Exam:  Physical Exam  Constitutional:       Appearance: Normal appearance.   HENT:      Head: Normocephalic.      Mouth/Throat:      Mouth: Mucous membranes are moist.      Pharynx: Oropharynx is clear.   Eyes:      Extraocular Movements: Extraocular movements intact.   Cardiovascular:      Rate and Rhythm: Normal rate and regular rhythm.   Pulmonary:      Effort: Pulmonary effort is normal. No respiratory distress.   Abdominal:      General: There is no distension.      Palpations: Abdomen is soft.      Tenderness: There is no abdominal tenderness.   Musculoskeletal:      Right lower leg: No edema.      Left lower leg: No edema.   Skin:     General: Skin is warm.   Neurological:      Mental Status: He is alert and oriented to person, place, and time.   Psychiatric:         Mood and Affect: Mood normal.         Behavior: Behavior normal.    Pertinent  and/or Most Recent Results     LAB RESULTS:      Lab 04/16/25  0921 04/15/25  1327 04/14/25  0958   WBC 6.40 5.99 6.11   HEMOGLOBIN 14.6 14.9 16.5   HEMATOCRIT 43.6 45.9 49.6   PLATELETS 132* 120* 134*   NEUTROS ABS 4.82  --  4.91   IMMATURE GRANS (ABS) 0.06*  --  0.08*   LYMPHS ABS 0.97  --  0.68*   MONOS ABS 0.44  --  0.38   EOS ABS 0.09  --  0.05   MCV 93.6 96.0 92.9   PROCALCITONIN  --   --  0.06   LACTATE  --   --  1.8         Lab 04/16/25  0921 04/15/25  1213 04/14/25  0958   SODIUM 137 135* 138   POTASSIUM 4.1 4.3 4.0   CHLORIDE 103 103 103   CO2 23.0 22.0 23.5   ANION GAP 11.0 10.0 11.5   BUN 13 15 18   CREATININE 0.81 0.85 1.14   EGFR 92.5 91.2 67.5   GLUCOSE 122* 108* 138*   CALCIUM 8.6 8.6 9.6         Lab 04/14/25  0958   TOTAL PROTEIN 6.1   ALBUMIN 3.7   GLOBULIN 2.4   ALT (SGPT) 50*   AST (SGOT) 38   BILIRUBIN 0.7   ALK PHOS 63                     Brief Urine Lab Results  (Last result in the past 365 days)        Color   Clarity   Blood   Leuk Est   Nitrite   Protein   CREAT   Urine HCG        04/14/25 1053 Yellow   Clear   Large  (3+)   Trace   Negative   100 mg/dL (2+)                 Microbiology Results (last 10 days)       Procedure Component Value - Date/Time    Blood Culture - Blood, Hand, Left [[id number]]  (Normal) Collected: 04/14/25 1228    Lab Status: Preliminary result Specimen: Blood from Hand, Left Updated: 04/16/25 2131     Blood Culture No growth at 2 days    Blood Culture - Blood, Hand, Right [[id number]]  (Normal) Collected: 04/14/25 1220    Lab Status: Preliminary result Specimen: Blood from Hand, Right Updated: 04/16/25 2131     Blood Culture No growth at 2 days    Urine Culture - Urine, Urine, Catheter [[id number]]  (Normal) Collected: 04/14/25 1053    Lab Status: Final result Specimen: Urine, Catheter Updated: 04/16/25 0417     Urine Culture No growth    Urine Culture - Urine, Urine, Clean Catch [[id number]]  (Abnormal)  (Susceptibility) Collected: 04/07/25 1033    Lab Status: Final result Specimen: Urine, Clean Catch Updated: 04/09/25 0941     Urine Culture >100,000 CFU/mL Klebsiella pneumoniae ssp pneumoniae    Narrative:      Colonization of the urinary tract without infection is common. Treatment is discouraged unless the patient is symptomatic, pregnant, or undergoing an invasive urologic procedure.    Susceptibility        Klebsiella pneumoniae ssp pneumoniae      XENA      Amoxicillin + Clavulanate Susceptible      Ampicillin Resistant      Ampicillin + Sulbactam Susceptible      Cefazolin (Urine) Susceptible      Cefepime Susceptible      Ceftazidime Susceptible      Ceftriaxone Susceptible      Gentamicin Susceptible      Levofloxacin Susceptible      Nitrofurantoin Intermediate      Piperacillin + Tazobactam Susceptible      Trimethoprim + Sulfamethoxazole Susceptible                                   CT Abdomen Pelvis With Contrast  Result Date: 4/14/2025  CT ABDOMEN PELVIS W CONTRAST Date of Exam: 4/14/2025 11:12 AM EDT Indication: right flank pain, recent cystitis, worsening pain. Comparison: PET/CT 12/31/2024  and CT abdomen pelvis 12/19/2024 Technique: Axial CT images were obtained of the abdomen and pelvis following the uneventful intravenous administration of 85 mL Isovue-300. Reconstructed coronal and sagittal images were also obtained. Automated exposure control and iterative construction methods were used. Findings: LUNG BASES:  Unremarkable without mass or infiltrate. LIVER: The liver has decreased attenuation compatible with steatosis. There is a cyst in the right hepatic lobe measuring 1.7 cm. BILIARY/GALLBLADDER:  Unremarkable SPLEEN:  Unremarkable PANCREAS: There is stable appearance to a 1.8 x 2.8 cm low attenuating lesion adjacent to the pancreatic head (image 44 of series 2). This measures water density and may represent an exophytic pancreatic cyst. ADRENAL:  Unremarkable KIDNEYS: Evaluation of the right kidney demonstrates an exophytic cyst along the superior pole measuring 8 cm. No evidence for hydronephrosis or hydroureter. The left kidney is uniform in appearance without hydronephrosis or hydroureter. No evidence for nephrolithiasis. GASTROINTESTINAL/MESENTERY: Evaluation of the gastrointestinal tract demonstrates diverticulosis without CT evidence for acute diverticulitis. No inflammatory changes in the right lower quadrant. Spacer device is noted interposed between the anterior margin of the sigmoid and posterior margin of the prostate. MESENTERIC VESSELS:  Patent. AORTA/IVC:  Normal caliber. RETROPERITONEUM/LYMPH NODES: There is stable appearance to a lymph node adjacent to the left external iliac chain measuring 9 mm in short axis (image 109 of series 2). A lymph node is noted along the right external iliac chain also measuring 9 mm (image 112). REPRODUCTIVE: The prostate demonstrates fiduciary markers measuring 5.4 cm transversely. BLADDER: The urinary bladder is decompressed but demonstrates prominent wall thickening measuring up to 2.4 cm in diameter. There is adjacent inflammatory fat stranding  compatible with cystitis. A diverticulum is noted along the right superior lateral margin. OSSEUS STRUCTURES:  Typical for age with no acute process identified.     Impression: Urinary bladder wall thickening with inflammatory fat stranding compatible with cystitis. Diverticulosis. Stable appearance to pancreatic cyst measuring 2.8 cm. Electronically Signed: Mitzi Gilliam MD  4/14/2025 11:42 AM EDT  Workstation ID: [id number]                  Plan for Follow-up of Pending Labs/Results:   Pending Labs       Order Current Status    Blood Culture - Blood, Hand, Left Preliminary result    Blood Culture - Blood, Hand, Right Preliminary result          Discharge Details        Discharge Medications        New Medications        Instructions Start Date   famotidine 20 MG tablet  Commonly known as: Pepcid   20 mg, Oral, Daily      predniSONE 20 MG tablet  Commonly known as: DELTASONE   20 mg, Oral, Daily   Start Date: April 18, 2025            Continue These Medications        Instructions Start Date   Cartia  MG 24 hr capsule  Generic drug: dilTIAZem CD   180 mg, Daily      Gemtesa 75 MG tablet  Generic drug: Vibegron   75 mg, Oral, Daily      hyoscyamine 0.125 MG SL tablet  Commonly known as: Levsin/SL   0.125 mg, Sublingual, Every 4 Hours PRN      losartan 50 MG tablet  Commonly known as: COZAAR   50 mg, Daily      MELATONIN ER PO   1 tablet, Nightly      metoprolol succinate XL 25 MG 24 hr tablet  Commonly known as: TOPROL-XL   25 mg, Daily      silodosin 8 MG capsule capsule  Commonly known as: RAPAFLO   8 mg, Daily      sulfamethoxazole-trimethoprim 800-160 MG per tablet  Commonly known as: Bactrim DS   1 tablet, Oral, 2 Times Daily      Xarelto 20 MG tablet  Generic drug: rivaroxaban   1 tablet, Daily             Stop These Medications      methylPREDNISolone 4 MG dose pack  Commonly known as: MEDROL              Allergies   Allergen Reactions    Zosyn [Piperacillin-Tazobactam In Dex] Shortness Of Breath      Has tolerated Cefepime  Inpatient reaction 12/19: throat swelling and SOA req trx.         Discharge Disposition:  Home or Self Care    Diet:  Hospital:  Diet Order   Procedures    Diet: Cardiac; Healthy Heart (2-3 Na+); Fluid Consistency: Thin (IDDSI 0)            Activity:  As tolerated        CODE STATUS:    Code Status and Medical Interventions: CPR (Attempt to Resuscitate); Full Support   Ordered at: 04/14/25 1806     Code Status (Patient has no pulse and is not breathing):    CPR (Attempt to Resuscitate)     Medical Interventions (Patient has pulse or is breathing):    Full Support     Level Of Support Discussed With:    Patient       Future Appointments   Date Time Provider Department Center   4/29/2025  9:30 AM Matt Nicholson APRN [facility] None   6/2/2025  9:40 AM Adam Nguyen MD [facility]   10/27/2025  9:30 AM Jeronimo Schwartz MD [facility] None                 Chauncey Khan MD  04/17/25      Time Spent on Discharge:  I spent  35  minutes on this discharge activity which included: face-to-face encounter with the patient, reviewing the data in the system, coordination of the care with the nursing staff as well as consultants, documentation, and entering orders.      Attending Attestation         I have performed an independent face-to-face diagnostic evaluation including performing an independent physical examination.  The documented plan of care above was reviewed and developed with Dr. Khan,  resident, who performed portions of the examination and documentation for this patient's care under my direct supervision.       Brief HPI  No acute events overnight, patient states that he rested well, ready to go home.     Attending Physical Exam:  Temp:  [97.5 °F (36.4 °C)-98 °F (36.7 °C)] 97.5 °F (36.4 °C)  Heart Rate:  [43-77] 70  Resp:  [16-18] 18  BP: (104-147)/(71-81) 139/80     Constitutional: No acute distress, awake, alert  HENT: NCAT, mucous membranes  moist  Respiratory: Clear to auscultation bilaterally, respiratory effort normal   Cardiovascular: RRR, no murmurs, rubs, or gallops  Gastrointestinal: Positive bowel sounds, soft, nontender, nondistended  : Rubio catheter in place  Musculoskeletal: No bilateral ankle edema  Psychiatric: Appropriate affect, cooperative  Neurologic: Oriented x 3, nonfocal     Assessment and Plan:     See assessment and plan documented by resident above and updated/edited by me as appropriate.     Yana Rodrigues MD  04/17/25

## 2025-04-19 LAB
BACTERIA SPEC AEROBE CULT: NORMAL
BACTERIA SPEC AEROBE CULT: NORMAL

## 2025-04-22 ENCOUNTER — READMISSION MANAGEMENT (OUTPATIENT)
Dept: CALL CENTER | Facility: HOSPITAL | Age: 75
End: 2025-04-22
Payer: MEDICARE

## 2025-04-22 NOTE — OUTREACH NOTE
Medical Week 1 Survey      Flowsheet Row Responses   [facility] patient discharged from? [place]   Does the patient have one of the following disease processes/diagnoses(primary or secondary)? Other   Week 1 attempt successful? Yes   Call start time 1007   Call end time 1008   Discharge diagnosis Flank pain, UTI   Meds reviewed with patient/caregiver? Yes   Is the patient having any side effects they believe may be caused by any medication additions or changes? No   Does the patient have all medications ordered at discharge? Yes   Is the patient taking all medications as directed (includes completed medication regime)? Yes   Does the patient have a primary care provider?  Yes   Does the patient have an appointment with their PCP within 7 days of discharge? Yes   Has the patient kept scheduled appointments due by today? N/A   Has home health visited the patient within 72 hours of discharge? N/A   Psychosocial issues? No   Did the patient receive a copy of their discharge instructions? Yes   Nursing interventions Reviewed instructions with patient   What is the patient's perception of their health status since discharge? Improving   Is the patient/caregiver able to teach back the hierarchy of who to call/visit for symptoms/problems? PCP, Specialist, Home health nurse, Urgent Care, ED, 911 Yes   Week 1 call completed? Yes   Graduated Yes   Wrap up additional comments Pt doing well and sees  today.   Call end time 1008            Alyse COLON - Registered Nurse

## 2025-04-29 ENCOUNTER — HOSPITAL ENCOUNTER (OUTPATIENT)
Dept: RADIATION ONCOLOGY | Facility: HOSPITAL | Age: 75
Setting detail: RADIATION/ONCOLOGY SERIES
Discharge: HOME | End: 2025-04-29
Payer: COMMERCIAL

## 2025-04-29 ENCOUNTER — CLINICAL SUPPORT (OUTPATIENT)
Dept: RADIATION ONCOLOGY | Facility: HOSPITAL | Age: 75
End: 2025-04-29
Payer: COMMERCIAL

## 2025-04-29 DIAGNOSIS — C61 PROSTATE CANCER: Primary | ICD-10-CM

## 2025-04-29 NOTE — PROGRESS NOTES
TELEMEDICINE FOLLOW UP NOTE    PATIENT:                                                      Vishal Dejesus  MEDICAL RECORD #:                        [id number]  :                                                          1950  COMPLETION DATE:   3/28/2025  DIAGNOSIS:     Prostate cancer  - Stage IIC (cT1c, cN0, cM0, PSA: 7.3, Grade Group: 4)    This visit has been converted to a telehealth virtual visit, the patient's preferred method for today's follow-up.  Total time of discussion was 18 minutes.  The patient has given verbal consent.      BRIEF HISTORY:  Vishal Dejesus is a 74 y.o. gentleman who presents today by telephone for initial follow-up visit.  He has a longstanding history of BPH and bladder outlet obstruction with severe urinary retention managed by intermittent self catheterization TID which preceded his more recent high risk prostate cancer diagnosis.  Staging imaging including PSMA PET/CT scan showed limited hypermetabolism in the left prostate and no evidence of regional or distant metastatic disease.  The patient declined androgen deprivation therapy and opted to pursue definitive treatment with a course of CyberKnife stereotactic body radiation therapy alone, receiving 35 Gray in 5 fractions on 3/28/2025.  He tolerated treatment well and continued to perform self catheterizations during treatment.  Shortly after treatment, he was seen by urology as an outpatient for evaluation of more frequent need for self catheterization, purulent urine, and ongoing rectal pain.  He was found to have Klebsiella UTI on culture and was started on Bactrim on 2025.  His symptoms worsened, including development of right flank pain, prompting further evaluation at [facility] ED where he was subsequently admitted on 2025.  CT abdomen pelvis was notable for cystitis and UA was consistent with persistent UTI.  Antibiotics were changed to IV ceftriaxone, clay catheter was anchored, and he was started on  Elmiron and prednisone for his symptoms in addition to continuing daily alpha blocker and oxybutynin.  At the time of discharge, his urine culture was negative and his clay catheter was removed with instructions to resume intermittent catheterization at home.  At this point, he has a few days left of his prednisone course and he has completed outpatient oral antibiotics.  He continues longstanding use of silodosin in addition to Vibegron.  He reports performing self catheterization 3-4 times/day and denies gross hematuria, pyuria, foul smelling urine, flank pain, or constitutional symptoms.  He is unable to void spontaneously but reports this pre-existed his treatment.  He notes bowels are moving daily or QOD and he has stool softeners and laxatives at home as needed.  He otherwise feels well, notes good energy level, and denies additional concerns today.      MEDICATIONS: Medication reconciliation for the patient was reviewed and confirmed in the electronic medical record.    Review of Systems   Gastrointestinal:  Positive for constipation (improving).   Genitourinary:  Positive for difficulty urinating (2/2 chronic BPH/retention requiring longstanding need for CIC TID-QID).    All other systems reviewed and are negative.          KPS 90%      Physical Exam  Pulmonary:      Respirations even, unlabored. No audible wheezing or cough.  Neurological:      A+Ox4, conversant, answers questions appropriately.  Psychiatric:     Judgement, affect, and decision-making WNL.    Limited physical exam as visit was conducted remotely via telephone.        The following portions of the patient's history were reviewed and updated as appropriate: allergies, current medications, past family history, past medical history, past social history, past surgical history and problem list.         Diagnoses and all orders for this visit:    1. Prostate cancer (Primary)         IMPRESSION:  Mr. Dejesus is a 74 y.o. gentleman with multiple  medical comorbidities and recent diagnosis of a clinical T1c, limited volume Marilyn 4+4=8 prostate adenocarcinoma with a pretreatment PSA of 7.3 ng/ml.  He is 1 month status post CyberKnife SBRT as a sole modality.  He tolerated treatment well and was able to complete treatment uneventfully.  He unfortunately developed a subsequent UTI necessitating brief hospitalization during which time he received IV antibiotics and had an indwelling clay catheter as part of his medical management, and he has since completed transition to oral antibiotics and is resuming his normal routine of intermittent self catheterizations which predated his cancer diagnosis and treatment.  His outflow obstructive symptoms are chronic and he is maintained on a few different medications per urology including Vibegron, silodosin, with a few days left in his oral prednisone taper.  He was reminded of infectious symptoms or further obstructive symptoms which would prompt need for repeat UA or urology/ED evaluation.  Overall, he seems be recovering well from acute radiation related toxicities and recent UTI.  Long-term, Dr. Nguyen has discussed performing a TURP once we are sufficiently far enough out from radiation for it to be safe.  Mr. Dejesus and I have reviewed the survivorship care plan in detail.  We discussed diagnosis, follow-up intervals, biannual PSA monitoring, and expectations for response to treatment, including typical timeline for PSA to hopefully reach target amber value of <0.5 ng/mL.  A copy of the care plan has been mailed to the patient.  A copy has also been sent to his PCP.      RECOMMENDATIONS:  Continues routine urologic surveillance and serial PSA monitoring under the direction of Dr. Nguyen.  RTC in 6 months or sooner as needed.      Return in about 6 months (around 10/29/2025) for Office Visit.    DEWEY Yadav spent a total of 45 minutes on today's visit, with more than 18 minutes in virtual  communication with the patient via telephone, and the remainder of the time spent in reviewing the relevant history, records, available imaging, and for documentation.

## 2025-05-05 ENCOUNTER — HOSPITAL ENCOUNTER (EMERGENCY)
Facility: HOSPITAL | Age: 75
Discharge: HOME | End: 2025-05-05
Attending: EMERGENCY MEDICINE | Admitting: EMERGENCY MEDICINE
Payer: MEDICARE

## 2025-05-05 ENCOUNTER — APPOINTMENT (OUTPATIENT)
Facility: HOSPITAL | Age: 75
End: 2025-05-05
Payer: MEDICARE

## 2025-05-05 VITALS
BODY MASS INDEX: 34.46 KG/M2 | WEIGHT: 260 LBS | HEIGHT: 73 IN | HEART RATE: 69 BPM | RESPIRATION RATE: 15 BRPM | DIASTOLIC BLOOD PRESSURE: 105 MMHG | TEMPERATURE: 98.2 F | OXYGEN SATURATION: 95 % | SYSTOLIC BLOOD PRESSURE: 164 MMHG

## 2025-05-05 DIAGNOSIS — Z79.01 CHRONIC ANTICOAGULATION: ICD-10-CM

## 2025-05-05 DIAGNOSIS — N30.00 ACUTE RECURRENT CYSTITIS: Primary | ICD-10-CM

## 2025-05-05 DIAGNOSIS — R31.0 GROSS HEMATURIA: ICD-10-CM

## 2025-05-05 LAB
ALBUMIN SERPL-MCNC: 4 G/DL (ref 3.5–5.2)
ALBUMIN/GLOB SERPL: 1.7 G/DL
ALP SERPL-CCNC: 57 U/L (ref 39–117)
ALT SERPL W P-5'-P-CCNC: 37 U/L (ref 1–41)
ANION GAP SERPL CALCULATED.3IONS-SCNC: 12.5 MMOL/L (ref 5–15)
AST SERPL-CCNC: 22 U/L (ref 1–40)
BACTERIA UR QL AUTO: ABNORMAL /HPF
BASOPHILS # BLD AUTO: 0.01 10*3/MM3 (ref 0–0.2)
BASOPHILS NFR BLD AUTO: 0.1 % (ref 0–1.5)
BILIRUB SERPL-MCNC: 1.1 MG/DL (ref 0–1.2)
BILIRUB UR QL STRIP: NEGATIVE
BUN SERPL-MCNC: 17 MG/DL (ref 8–23)
BUN/CREAT SERPL: 19.5 (ref 7–25)
CALCIUM SPEC-SCNC: 9.4 MG/DL (ref 8.6–10.5)
CHLORIDE SERPL-SCNC: 101 MMOL/L (ref 98–107)
CLARITY UR: ABNORMAL
CO2 SERPL-SCNC: 22.5 MMOL/L (ref 22–29)
COLOR UR: YELLOW
CREAT SERPL-MCNC: 0.87 MG/DL (ref 0.76–1.27)
D-LACTATE SERPL-SCNC: 1.4 MMOL/L (ref 0.5–2)
DEPRECATED RDW RBC AUTO: 47.2 FL (ref 37–54)
EGFRCR SERPLBLD CKD-EPI 2021: 90.5 ML/MIN/1.73
EOSINOPHIL # BLD AUTO: 0.04 10*3/MM3 (ref 0–0.4)
EOSINOPHIL NFR BLD AUTO: 0.5 % (ref 0.3–6.2)
ERYTHROCYTE [DISTWIDTH] IN BLOOD BY AUTOMATED COUNT: 13.9 % (ref 12.3–15.4)
GLOBULIN UR ELPH-MCNC: 2.4 GM/DL
GLUCOSE SERPL-MCNC: 116 MG/DL (ref 65–99)
GLUCOSE UR STRIP-MCNC: NEGATIVE MG/DL
HCT VFR BLD AUTO: 47 % (ref 37.5–51)
HGB BLD-MCNC: 15.9 G/DL (ref 13–17.7)
HGB UR QL STRIP.AUTO: ABNORMAL
HOLD SPECIMEN: NORMAL
HYALINE CASTS UR QL AUTO: ABNORMAL /LPF
IMM GRANULOCYTES # BLD AUTO: 0.03 10*3/MM3 (ref 0–0.05)
IMM GRANULOCYTES NFR BLD AUTO: 0.4 % (ref 0–0.5)
KETONES UR QL STRIP: NEGATIVE
LEUKOCYTE ESTERASE UR QL STRIP.AUTO: ABNORMAL
LIPASE SERPL-CCNC: 30 U/L (ref 13–60)
LYMPHOCYTES # BLD AUTO: 1.16 10*3/MM3 (ref 0.7–3.1)
LYMPHOCYTES NFR BLD AUTO: 14.9 % (ref 19.6–45.3)
MCH RBC QN AUTO: 31.1 PG (ref 26.6–33)
MCHC RBC AUTO-ENTMCNC: 33.8 G/DL (ref 31.5–35.7)
MCV RBC AUTO: 91.8 FL (ref 79–97)
MONOCYTES # BLD AUTO: 0.5 10*3/MM3 (ref 0.1–0.9)
MONOCYTES NFR BLD AUTO: 6.4 % (ref 5–12)
NEUTROPHILS NFR BLD AUTO: 6.02 10*3/MM3 (ref 1.7–7)
NEUTROPHILS NFR BLD AUTO: 77.7 % (ref 42.7–76)
NITRITE UR QL STRIP: NEGATIVE
PH UR STRIP.AUTO: 7 [PH] (ref 5–8)
PLATELET # BLD AUTO: 114 10*3/MM3 (ref 140–450)
PMV BLD AUTO: 9.5 FL (ref 6–12)
POTASSIUM SERPL-SCNC: 3.8 MMOL/L (ref 3.5–5.2)
PROT SERPL-MCNC: 6.4 G/DL (ref 6–8.5)
PROT UR QL STRIP: NEGATIVE
RBC # BLD AUTO: 5.12 10*6/MM3 (ref 4.14–5.8)
RBC # UR STRIP: ABNORMAL /HPF
REF LAB TEST METHOD: ABNORMAL
SODIUM SERPL-SCNC: 136 MMOL/L (ref 136–145)
SP GR UR STRIP: <=1.005 (ref 1–1.03)
SQUAMOUS #/AREA URNS HPF: ABNORMAL /HPF
UROBILINOGEN UR QL STRIP: ABNORMAL
WBC # UR STRIP: ABNORMAL /HPF
WBC NRBC COR # BLD AUTO: 7.76 10*3/MM3 (ref 3.4–10.8)
WHOLE BLOOD HOLD COAG: NORMAL
WHOLE BLOOD HOLD SPECIMEN: NORMAL

## 2025-05-05 PROCEDURE — 87086 URINE CULTURE/COLONY COUNT: CPT | Performed by: PHYSICIAN ASSISTANT

## 2025-05-05 PROCEDURE — 81001 URINALYSIS AUTO W/SCOPE: CPT | Performed by: EMERGENCY MEDICINE

## 2025-05-05 PROCEDURE — 87186 SC STD MICRODIL/AGAR DIL: CPT | Performed by: PHYSICIAN ASSISTANT

## 2025-05-05 PROCEDURE — 74177 CT ABD & PELVIS W/CONTRAST: CPT

## 2025-05-05 PROCEDURE — 83605 ASSAY OF LACTIC ACID: CPT | Performed by: EMERGENCY MEDICINE

## 2025-05-05 PROCEDURE — 51798 US URINE CAPACITY MEASURE: CPT

## 2025-05-05 PROCEDURE — 80053 COMPREHEN METABOLIC PANEL: CPT | Performed by: EMERGENCY MEDICINE

## 2025-05-05 PROCEDURE — 25510000001 IOPAMIDOL 61 % SOLUTION: Performed by: EMERGENCY MEDICINE

## 2025-05-05 PROCEDURE — 85025 COMPLETE CBC W/AUTO DIFF WBC: CPT | Performed by: EMERGENCY MEDICINE

## 2025-05-05 PROCEDURE — 96365 THER/PROPH/DIAG IV INF INIT: CPT

## 2025-05-05 PROCEDURE — 83690 ASSAY OF LIPASE: CPT | Performed by: EMERGENCY MEDICINE

## 2025-05-05 PROCEDURE — 25010000002 CEFTRIAXONE PER 250 MG: Performed by: PHYSICIAN ASSISTANT

## 2025-05-05 PROCEDURE — 87077 CULTURE AEROBIC IDENTIFY: CPT | Performed by: PHYSICIAN ASSISTANT

## 2025-05-05 PROCEDURE — 99285 EMERGENCY DEPT VISIT HI MDM: CPT | Performed by: EMERGENCY MEDICINE

## 2025-05-05 RX ORDER — SODIUM CHLORIDE 9 MG/ML
10 INJECTION, SOLUTION INTRAMUSCULAR; INTRAVENOUS; SUBCUTANEOUS AS NEEDED
Status: DISCONTINUED | OUTPATIENT
Start: 2025-05-05 | End: 2025-05-05 | Stop reason: HOSPADM

## 2025-05-05 RX ORDER — OXYBUTYNIN CHLORIDE 5 MG/1
5 TABLET, EXTENDED RELEASE ORAL DAILY
Qty: 7 TABLET | Refills: 0 | Status: SHIPPED | OUTPATIENT
Start: 2025-05-05 | End: 2025-05-12 | Stop reason: SDUPTHER

## 2025-05-05 RX ORDER — CEFUROXIME AXETIL 500 MG/1
500 TABLET ORAL 2 TIMES DAILY
Qty: 14 TABLET | Refills: 0 | Status: SHIPPED | OUTPATIENT
Start: 2025-05-05 | End: 2025-05-12

## 2025-05-05 RX ORDER — IOPAMIDOL 612 MG/ML
100 INJECTION, SOLUTION INTRAVASCULAR
Status: COMPLETED | OUTPATIENT
Start: 2025-05-05 | End: 2025-05-05

## 2025-05-05 RX ADMIN — CEFTRIAXONE 2000 MG: 2 INJECTION, POWDER, FOR SOLUTION INTRAMUSCULAR; INTRAVENOUS at 12:24

## 2025-05-05 RX ADMIN — IOPAMIDOL 88 ML: 612 INJECTION, SOLUTION INTRAVENOUS at 10:30

## 2025-05-05 NOTE — DISCHARGE INSTRUCTIONS
Take antibiotic as prescribed to treat underlying bladder infection.  You have declined a Rubio catheter today.  Continue to intermittent cath.  Dr. Nguyen recommends discontinuing your Xarelto for 48 hours given the blood in your urine.  Passing blood clots in your urine, have increased blood in urine or any other worsening symptoms or concerns you should return to the ED.  We discussed the risks of holding your Xarelto including risk of stroke, DVT, PE.  If you develop any weakness, numbness, paresthesias, leg pain or swelling, difficulty breathing, any worsening symptoms or concerns please return to nearest ED immediately.

## 2025-05-05 NOTE — FSED PROVIDER NOTE
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  Patient recently hospitalized for Klebsiella UTI with discharge date 4/17/25.  During his hospitalization he had a Rubio catheter anchored and received IV Rocephin, prednisone and oxybutynin.  Rubio catheter removed prior to discharge and patient to resume intermittent cathing.  Discharged on 2 weeks of Bactrim and prednisone which he finished 3 days ago per patient.  Since yesterday he has developed some pain again to the right flank and overnight after self catheterization patient had an episode of gross hematuria without any clots.  He denies hitting any resistance when performing the catheterization.  States the urine has been cloudy as well although he has not had any foul odor.  Reports some ongoing bladder spasms.  Denies any fevers, chills, nausea, emesis.  Denies any history of renal calculi.      Nurses Notes reviewed and agree, including vitals, allergies, social history and prior medical history.     REVIEW OF SYSTEMS: All systems reviewed and not pertinent unless noted.  Review of Systems   Constitutional:  Negative for fever.   Gastrointestinal:  Negative for abdominal pain, nausea and vomiting.   Genitourinary:  Positive for flank pain and hematuria. Negative for difficulty urinating and dysuria.        Cloudy urine   All other systems reviewed and are negative.      Past Medical History:   Diagnosis Date    A-fib     Arthritis 2013    Benign prostatic hyperplasia     Coronary artery disease 2017    Elevated PSA 11-21-24    See MRI    History of medical problems     Heart Ablasion 2013    Hyperlipidemia     Hypertension     Obesity     Prostate cancer  "    Completed cyber knife    Urinary tract infection     Treated       Allergies:    Zosyn [piperacillin-tazobactam in dex]      Past Surgical History:   Procedure Laterality Date    APPENDECTOMY  1986    Knee repacement left 2018    CARDIAC ABLATION  2013    CARDIAC SURGERY  2013    Ablasion    COLONOSCOPY  2021    CYBERKNIFE  03/28/2025    Prostate/SV    EYE SURGERY  2020    Detached Retina.   Catarac removed.    GOLD SEED FIDUCIAL PLACEMENT N/A 03/07/2025    Procedure: TRANSPERINEAL GOLD FIDUCIAL MARKER PLACEMENT;  Surgeon: Jeronimo Schwartz MD;  Location: [facility];  Service: Radiation Oncology;  Laterality: N/A;    JOINT REPLACEMENT  11/2018    Still hurts    KNEE SURGERY Left     PROSTATE SURGERY  3-2025    Cyber knife complete 3-    SEPTOPLASTY      VASECTOMY  2001    No         Social History     Socioeconomic History    Marital status:    Tobacco Use    Smoking status: Never    Smokeless tobacco: Never   Vaping Use    Vaping status: Never Used   Substance and Sexual Activity    Alcohol use: Yes     Alcohol/week: 10.0 standard drinks of alcohol     Types: 10 Drinks containing 0.5 oz of alcohol per week     Comment: Sure is good    Drug use: No    Sexual activity: Not Currently     Partners: Female     Birth control/protection: Vasectomy         Family History   Problem Relation Age of Onset    Hypertension Mother     Stroke Mother     Hypertension Sister     Diabetes Sister        Objective  Physical Exam:  BP (!) 164/105   Pulse 69   Temp 98.2 °F (36.8 °C) (Oral)   Resp 15   Ht 185.4 cm (73\")   Wt 118 kg (260 lb)   SpO2 95%   BMI 34.30 kg/m²      Physical Exam  Vitals and nursing note reviewed.   Constitutional:       General: He is not in acute distress.  HENT:      Head: Normocephalic and atraumatic.   Cardiovascular:      Rate and Rhythm: Normal rate and regular rhythm.   Pulmonary:      Effort: Pulmonary effort is normal. No respiratory distress.      Breath " sounds: Normal breath sounds.   Abdominal:      Palpations: Abdomen is soft.      Tenderness: There is no abdominal tenderness. There is no guarding or rebound.      Comments: Mild tenderness with palpation of right flank   Skin:     General: Skin is warm and dry.   Neurological:      Mental Status: He is alert.      Comments: Awake and alert   Psychiatric:         Mood and Affect: Mood normal.         Behavior: Behavior normal.         Procedures    ED Course:         Lab Results (last 24 hours)       Procedure Component Value Units Date/Time    CBC & Differential [[id number]]  (Abnormal) Collected: 05/05/25 1003    Specimen: Blood Updated: 05/05/25 1014    Narrative:      The following orders were created for panel order CBC & Differential.  Procedure                               Abnormality         Status                     ---------                               -----------         ------                     CBC Auto Differential[[id number]]        Abnormal            Final result                 Please view results for these tests on the individual orders.    Comprehensive Metabolic Panel [[id number]]  (Abnormal) Collected: 05/05/25 1003    Specimen: Blood Updated: 05/05/25 1031     Glucose 116 mg/dL      BUN 17 mg/dL      Creatinine 0.87 mg/dL      Sodium 136 mmol/L      Potassium 3.8 mmol/L      Chloride 101 mmol/L      CO2 22.5 mmol/L      Calcium 9.4 mg/dL      Total Protein 6.4 g/dL      Albumin 4.0 g/dL      ALT (SGPT) 37 U/L      AST (SGOT) 22 U/L      Alkaline Phosphatase 57 U/L      Total Bilirubin 1.1 mg/dL      Globulin 2.4 gm/dL      A/G Ratio 1.7 g/dL      BUN/Creatinine Ratio 19.5     Anion Gap 12.5 mmol/L      eGFR 90.5 mL/min/1.73     Narrative:      GFR Categories in Chronic Kidney Disease (CKD)              GFR Category          GFR (mL/min/1.73)    Interpretation  G1                    90 or greater        Normal or high (1)  G2                    60-89                Mild decrease (1)  G3a                    45-59                Mild to moderate decrease  G3b                   30-44                Moderate to severe decrease  G4                    15-29                Severe decrease  G5                    14 or less           Kidney failure    (1)In the absence of evidence of kidney disease, neither GFR category G1 or G2 fulfill the criteria for CKD.    eGFR calculation 2021 CKD-EPI creatinine equation, which does not include race as a factor    Lipase [[id number]]  (Normal) Collected: 05/05/25 1003    Specimen: Blood Updated: 05/05/25 1030     Lipase 30 U/L     Lactic Acid, Plasma [[id number]]  (Normal) Collected: 05/05/25 1003    Specimen: Blood Updated: 05/05/25 1029     Lactate 1.4 mmol/L     CBC Auto Differential [[id number]]  (Abnormal) Collected: 05/05/25 1003    Specimen: Blood Updated: 05/05/25 1014     WBC 7.76 10*3/mm3      RBC 5.12 10*6/mm3      Hemoglobin 15.9 g/dL      Hematocrit 47.0 %      MCV 91.8 fL      MCH 31.1 pg      MCHC 33.8 g/dL      RDW 13.9 %      RDW-SD 47.2 fl      MPV 9.5 fL      Platelets 114 10*3/mm3      Neutrophil % 77.7 %      Lymphocyte % 14.9 %      Monocyte % 6.4 %      Eosinophil % 0.5 %      Basophil % 0.1 %      Immature Grans % 0.4 %      Neutrophils, Absolute 6.02 10*3/mm3      Lymphocytes, Absolute 1.16 10*3/mm3      Monocytes, Absolute 0.50 10*3/mm3      Eosinophils, Absolute 0.04 10*3/mm3      Basophils, Absolute 0.01 10*3/mm3      Immature Grans, Absolute 0.03 10*3/mm3     Urinalysis With Microscopic If Indicated (No Culture) - Urine, Clean Catch [[id number]]  (Abnormal) Collected: 05/05/25 1046    Specimen: Urine, Clean Catch Updated: 05/05/25 1052     Color, UA Yellow     Appearance, UA Slightly Cloudy     pH, UA 7.0     Specific Gravity, UA <=1.005     Glucose, UA Negative     Ketones, UA Negative     Bilirubin, UA Negative     Blood, UA Moderate (2+)     Protein, UA Negative     Leuk Esterase, UA Moderate (2+)     Nitrite, UA Negative     Urobilinogen, UA  0.2 E.U./dL    Urinalysis, Microscopic Only - Urine, Clean Catch [[id number]]  (Abnormal) Collected: 05/05/25 1046    Specimen: Urine, Clean Catch Updated: 05/05/25 1109     RBC, UA 3-5 /HPF      WBC, UA Too Numerous to Count /HPF      Bacteria, UA 1+ /HPF      Squamous Epithelial Cells, UA 0-2 /HPF      Hyaline Casts, UA None Seen /LPF      Methodology Manual Light Microscopy    Urine Culture - Urine, Urine, Clean Catch [[id number]] Collected: 05/05/25 1046    Specimen: Urine, Clean Catch Updated: 05/05/25 1232             CT Abdomen Pelvis With Contrast  Result Date: 5/5/2025  CT ABDOMEN PELVIS W CONTRAST Date of Exam: 5/5/2025 10:22 AM EDT Indication: R flank pain, hx recurrent cystitis & prostate cancer. Comparison: CT abdomen pelvis 4/14/2025. Technique: Axial CT images were obtained of the abdomen and pelvis following the uneventful intravenous administration of 88 mL Isovue-300. Reconstructed coronal and sagittal images were also obtained. Automated exposure control and iterative construction methods were used. Findings: Visualized lung bases appear clear without focal airspace consolidation. Unchanged hepatic cyst along the gallbladder fossa. No new or enlarging liver lesion. Gallbladder is unremarkable. No biliary ductal dilatation. Unchanged cystic lesion posterior to the pancreatic head measuring 2.6 cm. No main pancreatic ductal dilatation. No findings of acute pancreatitis. Spleen is normal in size. No distinct adrenal nodule. Kidneys are symmetric in size and enhancement without hydronephrosis. Simple right upper pole cyst measuring over 8 cm, unchanged. Circumferential wall thickening of the urinary bladder with perivesicular fat stranding, consistent with cystitis. Multiple urinary bladder wall diverticula again noted. Normal excretion of contrast on delayed phase. Fiducial markers within the prostate gland. SpaceOAR gel within the rectoprostatic space. Colonic diverticulosis without evidence of acute  diverticulitis. Incidental area of fat necrosis/infarct within the left lower quadrant. No bowel obstruction. No free fluid or organized fluid collection. No free air. No pathologically enlarged lymph nodes. Unchanged bilateral external iliac lymph nodes measuring 9 mm in short axis. No abdominal aortic aneurysm. No body wall abnormality. Multilevel spondylosis. No acute or suspicious osseous lesion.     Impression: Impression: Persistent versus recurrent cystitis, which may be related to radiation. No hydronephrosis. Stable 2.6 cm exophytic cystic pancreatic lesion, possibly a postinflammatory cyst or sidebranch IPMN. Stable chronic/ancillary findings as above. Electronically Signed: Sid Parson MD  5/5/2025 11:09 AM EDT  Workstation ID: [id number]         MDM      Initial impression of presenting illness: 74-year-old male presents to ED for evaluation of right flank pain.    DDX: includes but is not limited to: Cystitis, pyelonephritis, kidney stone, chronic bladder outlet obstruction, prostatitis    Patient arrives afebrile with stable vitals interpreted by myself.     Pertinent features from physical exam: Very mild right flank tenderness to palpation otherwise exam unremarkable    Initial diagnostic plan: CBC, CMP, UA, bladder scan, CT abdomen pelvis with IV contrast.   Patient performs self-catheterization and following does have an episode of deangelo hematuria without any clots noted similar to the episode he had last night per patient.    Results from initial plan were reviewed and interpreted by me revealing nonactionable CBC and CMP.  Patient does have evidence of infection upon urinalysis with 1+ bacteria and too numerous to count WBCs with moderate leukocytes and moderate blood.  CT imaging of the abdomen pelvis with IV contrast which appreciated persistent versus recurrent cystitis without hydronephrosis.  Incidental finding of fat necrosis within the left lower quadrant.  Patient continues to deny any  abdominal pain and abdomen remains soft and nontender without any rebound or guarding.    Diagnostic information from other sources: Reviewed patient's recent emergency department visit as well as discharge summary.    Interventions: Medications administered as below    Medications   Sodium Chloride (PF) 0.9 % 10 mL (has no administration in time range)   iopamidol (ISOVUE-300) 61 % injection 100 mL (88 mL Intravenous Given 5/5/25 1030)   cefTRIAXone (ROCEPHIN) 2,000 mg in sodium chloride 0.9 % 100 mL MBP (0 mg Intravenous Stopped 5/5/25 1314)     Consultations/Discussion of results with other physicians: I discussed patient's case and workup today with his urologist, Dr. Nguyen, he did review patient's CT imaging and lab work today.  He recommended outpatient management and advised to offer patient Rubio catheter versus continuing his intermittent cathing.  Given the gross hematuria he did recommend discontinuing patient's Xarleto for 48 hours.  He did not feel patient needed continuous bladder irrigation at this time or inpatient management.  Patient was administered IV Rocephin during his visit and discharged home on p.o. cefuroxime &oxybutynin.  Had a thorough discussion at bedside with patient regarding results and Dr. Nguyen's recommendations.  Patient repeatedly declines Rubio cath and is adamant to continue his intermittent cathing.  We discussed the risks versus benefits of holding his Xarelto temporarily (48 hours) and patient is agreeable with this.  We discussed that he is at increased risk of blood clots and discussed warning signs of DVT, PE, stroke.  Patient will return to ED immediately for any worsening symptoms or concerns.  He has been instructed to call Dr. Nguyen's office to schedule an outpatient follow-up.  -----  ED Disposition       ED Disposition   Discharge    Condition   Stable    Comment   --             Final diagnoses:   Acute recurrent cystitis   Gross hematuria   Chronic  anticoagulation      Your Follow-Up Providers       Schedule an appointment as soon as possible for a visit  with Adam Nguyen MD.    Specialty: Urology  [address]  [address]  [place] [id number] [phone]               Go to  [facility] EMERGENCY DEPARTMENT [place].    Specialty: Emergency Medicine  Follow up details: If symptoms worsen  [address]  [facility] [address] [phone]             Orlando Curtis MD.    Specialty: Family Medicine  [address]  [address]  [place] [id number] [phone]                       Contact information for after-discharge care    Follow-up information has not been specified.                    Your medication list        START taking these medications        Instructions Last Dose Given Next Dose Due   cefuroxime 500 MG tablet  Commonly known as: CEFTIN      Take 1 tablet by mouth 2 (Two) Times a Day for 7 days.       oxybutynin XL 5 MG 24 hr tablet  Commonly known as: DITROPAN-XL      Take 1 tablet by mouth Daily for 7 days.              CONTINUE taking these medications        Instructions Last Dose Given Next Dose Due   Cartia  MG 24 hr capsule  Generic drug: dilTIAZem CD      Take 1 capsule by mouth Daily.       hyoscyamine 0.125 MG SL tablet  Commonly known as: Levsin/SL      Place 1 tablet under the tongue Every 4 (Four) Hours As Needed (Breakthrough bladder spasms).       losartan 50 MG tablet  Commonly known as: COZAAR      Take 1 tablet by mouth Daily. for blood pressure       MELATONIN ER PO      Take 1 tablet by mouth Every Night. Pure Zzzs       metoprolol succinate XL 25 MG 24 hr tablet  Commonly known as: TOPROL-XL      Take 1 tablet by mouth Daily.       silodosin 8 MG capsule capsule  Commonly known as: RAPAFLO      Take 1 capsule by mouth Daily.       Xarelto 20 MG tablet  Generic drug: rivaroxaban      Take 1 tablet by mouth Daily.                 Where to Get Your  Medications        These medications were sent to [id number] - [place], [place] - [address] - [phone] PH - [phone] FX  [address] [address]      Phone: [phone]   cefuroxime 500 MG tablet  oxybutynin XL 5 MG 24 hr tablet

## 2025-05-08 LAB — BACTERIA SPEC AEROBE CULT: ABNORMAL

## 2025-05-12 ENCOUNTER — OFFICE VISIT (OUTPATIENT)
Age: 75
End: 2025-05-12
Payer: COMMERCIAL

## 2025-05-12 VITALS
HEART RATE: 79 BPM | BODY MASS INDEX: 34.46 KG/M2 | OXYGEN SATURATION: 96 % | HEIGHT: 73 IN | WEIGHT: 260 LBS | DIASTOLIC BLOOD PRESSURE: 80 MMHG | SYSTOLIC BLOOD PRESSURE: 130 MMHG

## 2025-05-12 DIAGNOSIS — N13.8 BPH WITH OBSTRUCTION/LOWER URINARY TRACT SYMPTOMS: Primary | ICD-10-CM

## 2025-05-12 DIAGNOSIS — N30.00 ACUTE CYSTITIS WITHOUT HEMATURIA: ICD-10-CM

## 2025-05-12 DIAGNOSIS — R33.8 URINARY RETENTION DUE TO BENIGN PROSTATIC HYPERPLASIA: ICD-10-CM

## 2025-05-12 DIAGNOSIS — N32.89 BLADDER SPASMS: ICD-10-CM

## 2025-05-12 DIAGNOSIS — N40.1 URINARY RETENTION DUE TO BENIGN PROSTATIC HYPERPLASIA: ICD-10-CM

## 2025-05-12 DIAGNOSIS — C61 PROSTATE CANCER: ICD-10-CM

## 2025-05-12 DIAGNOSIS — N32.81 OAB (OVERACTIVE BLADDER): ICD-10-CM

## 2025-05-12 DIAGNOSIS — N40.1 BPH WITH OBSTRUCTION/LOWER URINARY TRACT SYMPTOMS: Primary | ICD-10-CM

## 2025-05-12 LAB
BILIRUB BLD-MCNC: NEGATIVE MG/DL
CLARITY, POC: CLEAR
COLOR UR: YELLOW
EXPIRATION DATE: ABNORMAL
GLUCOSE UR STRIP-MCNC: NEGATIVE MG/DL
KETONES UR QL: NEGATIVE
LEUKOCYTE EST, POC: ABNORMAL
Lab: ABNORMAL
NITRITE UR-MCNC: NEGATIVE MG/ML
PH UR: 5.5 [PH] (ref 5–8)
PROT UR STRIP-MCNC: ABNORMAL MG/DL
RBC # UR STRIP: NEGATIVE /UL
SP GR UR: 1.02 (ref 1–1.03)
UROBILINOGEN UR QL: ABNORMAL

## 2025-05-12 PROCEDURE — 99214 OFFICE O/P EST MOD 30 MIN: CPT | Performed by: STUDENT IN AN ORGANIZED HEALTH CARE EDUCATION/TRAINING PROGRAM

## 2025-05-12 PROCEDURE — 81003 URINALYSIS AUTO W/O SCOPE: CPT | Performed by: STUDENT IN AN ORGANIZED HEALTH CARE EDUCATION/TRAINING PROGRAM

## 2025-05-12 RX ORDER — CEFTRIAXONE 2 G/1
2 INJECTION, POWDER, FOR SOLUTION INTRAMUSCULAR; INTRAVENOUS EVERY 24 HOURS
OUTPATIENT
Start: 2025-05-12 | End: 2025-05-13

## 2025-05-12 RX ORDER — OXYBUTYNIN CHLORIDE 5 MG/1
5 TABLET, EXTENDED RELEASE ORAL DAILY
Qty: 90 TABLET | Refills: 0 | Status: SHIPPED | OUTPATIENT
Start: 2025-05-12 | End: 2025-08-10

## 2025-05-12 NOTE — PROGRESS NOTES
Follow Up Office Visit      Patient Name: Vishal Dejesus  : 1950   MRN: [id number]     Chief Complaint:    Chief Complaint   Patient presents with    Prostate Cancer       Referring Provider: Orlando Curtis,*    History of Present Illness: Vishal Dejesus is a 74 y.o. male who presents today for follow up of prostate cancer, extreme BPH, chronic urinary retention.  Patient has significant untreated BPH, initial visit with me indicated severe incomplete bladder emptying.  He initiated intermittent catheterization, he had significantly elevated PSA and eventual workup indicated PI-RADS 5 lesion on the MRI, ultimately he underwent a prostate biopsy in the office on December 10, 2024 indicating high-volume grade group 2 and grade group 4 prostate cancer.  He was admitted to the hospital in December after his biopsy with urinary retention and intermittent catheterization.  He has been suffering from recurrent urinary tract infections since this time requiring multiple rounds of antibiotics.  Patient's prostate volume was 91 .  Patient completed CyberKnife radiation with Dr. Schwartz and had significant worsening rectal and urinary symptoms after this requiring hospitalization he was placed on further rounds of antibiotics and steroids.  His last hospitalization was in mid April.  Had right sided flank pain and concern for UTI at that time.  Urine culture on admission was no growth however urine culture 1 week prior demonstrated persisting Klebsiella, repeat urine culture on  was Klebsiella.  Rubio catheter was placed during most recent inpatient admission and was removed at the time of discharge with resolution of his right flank pain.  CT scan during admission demonstrated no hydronephrosis.  There is a thickened bladder wall consistent with cystitis at that time.  He presented to the emergency department on May 5 with ongoing right-sided flank pain.  He was diagnosed with a  repeat Klebsiella UTI and treated with cefuroxime and oxybutynin for his severe bladder spasms.  Over the last week patient has had complete resolution of his symptoms.  States the antibiotic or the oxybutynin has significantly improved his symptoms.  He is catheterizing 2-3 times a day with significant urine volumes.  CT scan at the time of your admission reviewed demonstrating significantly enlarged prostate with intravesical protrusion.       Subjective      Review of System: Review of Systems   Genitourinary:  Positive for difficulty urinating.      I have reviewed the ROS documented by my clinical staff, I have updated appropriately and I agree. Adam Nguyen MD    I have reviewed and the following portions of the patient's history were updated as appropriate: past family history, past medical history, past social history, past surgical history and problem list.    Medications:     Current Outpatient Medications:     oxybutynin XL (DITROPAN-XL) 5 MG 24 hr tablet, Take 1 tablet by mouth Daily for 90 days., Disp: 90 tablet, Rfl: 0    CARTIA  MG 24 hr capsule, Take 1 capsule by mouth Daily., Disp: , Rfl: 0    cefuroxime (CEFTIN) 500 MG tablet, Take 1 tablet by mouth 2 (Two) Times a Day for 7 days., Disp: 14 tablet, Rfl: 0    hyoscyamine (Levsin/SL) 0.125 MG SL tablet, Place 1 tablet under the tongue Every 4 (Four) Hours As Needed (Breakthrough bladder spasms). (Patient not taking: Reported on 4/14/2025), Disp: 30 tablet, Rfl: 2    losartan (COZAAR) 50 MG tablet, Take 1 tablet by mouth Daily. for blood pressure, Disp: , Rfl:     MELATONIN ER PO, Take 1 tablet by mouth Every Night. Pure Zzzs, Disp: , Rfl:     metoprolol succinate XL (TOPROL-XL) 25 MG 24 hr tablet, Take 1 tablet by mouth Daily., Disp: , Rfl:     silodosin (RAPAFLO) 8 MG capsule capsule, Take 1 capsule by mouth Daily. (Patient not taking: Reported on 4/15/2025), Disp: , Rfl:     Xarelto 20 MG tablet, Take 1 tablet by mouth Daily., Disp: ,  "Rfl:     Allergies:   Allergies   Allergen Reactions    Zosyn [Piperacillin-Tazobactam In Dex] Shortness Of Breath     Has tolerated Cefepime  Inpatient reaction 12/19: throat swelling and SOA req trx.       IPSS Questionnaire (AUA-7):  Over the past month…    1)  Incomplete Emptying:       How often have you had a sensation of not emptying you had the sensation of not emptying your bladder completely after you finished urinating?  0 - Not at all   2)  Frequency:       How often have you had the urinate again less than two hours after you finished urinating?  0 - Not at all   3)  Intermittency:       How often have you found you stopped and started again several times when you urinated?   0 - Not at all   4) Urgency:      How often have you found it difficult to postpone urination?  0 - Not at all   5) Weak Stream:      How often have you had a weak urinary stream?  0 - Not at all   6) Straining:       How often have you had to push or strain to begin urination?  0 - Not at all   7) Nocturia:      How many times did you most typically get up to urinate from the time you went to bed at night until the time you got up in the morning?  1 - 1 time   Total Score:  1   The International Prostate Symptom Score (IPSS) is used to screen, diagnose, track symptoms of benign prostatic hyperplasia (BPH).   0-7 (Mild Symptoms) 8-19 (Moderate) 20-35 (Severe)   Quality of Life (QoL):  If you were to spend the rest of your life with your urinary condition just the way it is now, how would you feel about that? 5-Unhappy   Urine Leakage (Incontinence) 0-No Leakage       Objective     Physical Exam:   Vital Signs:   Vitals:    05/12/25 1108   BP: 130/80   BP Location: Right arm   Patient Position: Sitting   Cuff Size: Adult   Pulse: 79   SpO2: 96%   Weight: 118 kg (260 lb)   Height: 185.4 cm (73\")  Comment: pt stated height     Body mass index is 34.3 kg/m².     Physical Exam  Constitutional:       Appearance: Normal appearance. He is " obese.   HENT:      Head: Normocephalic and atraumatic.      Nose: Nose normal.   Eyes:      Extraocular Movements: Extraocular movements intact.      Conjunctiva/sclera: Conjunctivae normal.      Pupils: Pupils are equal, round, and reactive to light.   Musculoskeletal:         General: Normal range of motion.      Cervical back: Normal range of motion and neck supple.   Skin:     General: Skin is warm and dry.      Findings: No lesion or rash.   Neurological:      General: No focal deficit present.      Mental Status: He is alert and oriented to person, place, and time. Mental status is at baseline.   Psychiatric:         Mood and Affect: Mood normal.         Behavior: Behavior normal.         Labs:   Brief Urine Lab Results  (Last result in the past 365 days)        Color   Clarity   Blood   Leuk Est   Nitrite   Protein   CREAT   Urine HCG        05/12/25 1145 Yellow   Clear   Negative   Trace   Negative   Trace                   Urine Culture          4/7/2025    10:33 4/14/2025    10:53 5/5/2025    10:46   Urine Culture   Urine Culture >100,000 CFU/mL Klebsiella pneumoniae ssp pneumoniae  No growth  25,000 CFU/mL Klebsiella pneumoniae ssp pneumoniae         Lab Results   Component Value Date    GLUCOSE 116 (H) 05/05/2025    CALCIUM 9.4 05/05/2025     05/05/2025    K 3.8 05/05/2025    CO2 22.5 05/05/2025     05/05/2025    BUN 17 05/05/2025    CREATININE 0.87 05/05/2025    EGFRIFAFRI 70 10/04/2024    EGFRIFNONA 82 10/29/2019    BCR 19.5 05/05/2025    ANIONGAP 12.5 05/05/2025       Lab Results   Component Value Date    WBC 7.76 05/05/2025    HGB 15.9 05/05/2025    HCT 47.0 05/05/2025    MCV 91.8 05/05/2025     (L) 05/05/2025       Images:   CT Abdomen Pelvis With Contrast  Result Date: 5/5/2025  Impression: Persistent versus recurrent cystitis, which may be related to radiation. No hydronephrosis. Stable 2.6 cm exophytic cystic pancreatic lesion, possibly a postinflammatory cyst or sidebranch  IPMN. Stable chronic/ancillary findings as above. Electronically Signed: Sid Parson MD  5/5/2025 11:09 AM EDT  Workstation ID: [id number]    CT Abdomen Pelvis With Contrast  Result Date: 4/14/2025  Impression: Urinary bladder wall thickening with inflammatory fat stranding compatible with cystitis. Diverticulosis. Stable appearance to pancreatic cyst measuring 2.8 cm. Electronically Signed: Mitzi Gilliam MD  4/14/2025 11:42 AM EDT  Workstation ID: [id number]    MRI Cyberknife Pelvis Without Contrast  Result Date: 3/15/2025  Impression: 1. Motion-degraded exam. 2. Few fiduciary markers in the prostate gland. 3. Retroprostatic hydrogel spacer without overt rectal wall invasion. Electronically Signed: Orlando Varela MD  3/15/2025 1:41 PM EDT  Workstation ID: [id number]      Measures:   Tobacco:   Vishal Silveirar  reports that he has never smoked. He has never used smokeless tobacco.          Assessment / Plan      Assessment/Plan:   74 y.o. male who presented today for follow up of high risk prostate cancer status post CyberKnife, extreme BPH 91 g prostate with significant intravesical protrusion and bladder outlet obstruction, this is a chronic issue, PVR is greater than 500 cc.  Unable to urinate at all naturally anymore and will require bladder outlet surgery.  I discussed with Dr. Schwartz who would prefer we wait 3 months after completion of CyberKnife radiation to consider bladder outlet surgery.  I recommended TURP for efficient tissue removal.  Overnight stay in the hospital.  Risks include bleeding, infection, stricture, bladder neck contracture, anesthesia complications.  He is on Xarelto and will need to hold this at least 3 days prior to surgery and at least 1 week after surgery to reduce his risk of bleeding.  We will need cardiac clearance given his history of atrial fibrillation.  He will continue oxybutynin.  Patient is amenable to proceeding.  No guarantee that he will be able to urinate after the  procedure but we suspect he will regain his ability to naturally urinate after the procedure and hopefully not have to catheterize any further    Diagnoses and all orders for this visit:    1. BPH with obstruction/lower urinary tract symptoms (Primary)  -     Case Request; Standing  -     CBC (No Diff); Future  -     Basic Metabolic Panel; Future  -     Urinalysis without microscopic (no culture) - Urine, Clean Catch; Future  -     ECG 12 Lead; Future  -     cefTRIAXone (ROCEPHIN) injection 2 g  -     Case Request    2. Acute cystitis without hematuria  -     POC Urinalysis Dipstick, Automated    3. Prostate cancer  -     POC Urinalysis Dipstick, Automated  -     PSA Diagnostic; Future    4. OAB (overactive bladder)  -     oxybutynin XL (DITROPAN-XL) 5 MG 24 hr tablet; Take 1 tablet by mouth Daily for 90 days.  Dispense: 90 tablet; Refill: 0    5. Bladder spasms  -     oxybutynin XL (DITROPAN-XL) 5 MG 24 hr tablet; Take 1 tablet by mouth Daily for 90 days.  Dispense: 90 tablet; Refill: 0    6. Urinary retention due to benign prostatic hyperplasia  -     Case Request; Standing  -     CBC (No Diff); Future  -     Basic Metabolic Panel; Future  -     Urinalysis without microscopic (no culture) - Urine, Clean Catch; Future  -     ECG 12 Lead; Future  -     cefTRIAXone (ROCEPHIN) injection 2 g  -     Case Request    Other orders  -     Follow Anesthesia Guidelines / Protocol; Future  -     Provide NPO Instructions to Patient; Future  -     Provide Hydration Instructions to Patient; Future  -     Provide Chlorhexidine Skin Prep Wipes and Instructions; Future  -     Nursing to Order Blood Glucose on all Inpatients >18 years Old with not BMP Ordered; Future  -     Nursing to Place Order for HgbA1c on Adult Patients >18 Years Old (HgbA1c Within One Month of Admission Acceptable); Future  -     Follow Anesthesia Guidelines / Protocol; Standing  -     Verify NPO Status; Standing  -     Verify the Time Patient Completed  Gatorade / G2; Standing  -     Place Sequential Compression Device; Standing  -     Maintain Sequential Compression Device; Standing  -     Nurse to Contact Surgeon for Cardiology Consult if Indicated; Future  -     Nurse to Consult  Anesthesia if Indicated; Future           Follow Up:   No follow-ups on file.    I spent approximately 30 minutes providing clinical care for this patient; including review of patient's chart and provider documentation, face to face time spent with patient in examination room (obtaining history, performing physical exam, discussing diagnosis and management options), placing orders, and completing patient documentation.     Adam Nguyen MD  [facility]

## 2025-05-19 ENCOUNTER — TELEPHONE (OUTPATIENT)
Dept: UROLOGY | Facility: CLINIC | Age: 75
End: 2025-05-19
Payer: MEDICARE

## 2025-05-19 DIAGNOSIS — N30.00 ACUTE CYSTITIS WITHOUT HEMATURIA: Primary | ICD-10-CM

## 2025-05-19 RX ORDER — CEFUROXIME AXETIL 500 MG/1
500 TABLET ORAL 2 TIMES DAILY
Qty: 20 TABLET | Refills: 0 | Status: SHIPPED | OUTPATIENT
Start: 2025-05-19 | End: 2025-05-26 | Stop reason: SDUPTHER

## 2025-05-19 NOTE — TELEPHONE ENCOUNTER
PT called in wanting a refill of his antibiotic cefuroxime. I stated that normally once you finish a course of antibiotics you do not need to continue. I asked him if he was still having symptoms and he states that he is still having bladder spasms. He states that this improved while he was on the cefuroxime and this does not improve when he caths himself. I informed him I would send a message to Dr. Nguyen to see what he would like to do today.

## 2025-05-19 NOTE — TELEPHONE ENCOUNTER
Called and let PT know about abx being sent in and he doesn't need to come into the office for a UA. Informed him that if symptoms persist after finishing course he should come in for urine culture. He verbalized understanding

## 2025-05-23 ENCOUNTER — CLINICAL SUPPORT (OUTPATIENT)
Dept: UROLOGY | Facility: CLINIC | Age: 75
End: 2025-05-23
Payer: MEDICARE

## 2025-05-23 ENCOUNTER — TELEPHONE (OUTPATIENT)
Dept: UROLOGY | Facility: CLINIC | Age: 75
End: 2025-05-23
Payer: MEDICARE

## 2025-05-23 DIAGNOSIS — N30.00 ACUTE CYSTITIS WITHOUT HEMATURIA: ICD-10-CM

## 2025-05-23 DIAGNOSIS — N32.89 BLADDER SPASMS: Primary | ICD-10-CM

## 2025-05-23 LAB
BILIRUB BLD-MCNC: NEGATIVE MG/DL
CLARITY, POC: ABNORMAL
COLOR UR: YELLOW
EXPIRATION DATE: ABNORMAL
GLUCOSE UR STRIP-MCNC: NEGATIVE MG/DL
KETONES UR QL: NEGATIVE
LEUKOCYTE EST, POC: ABNORMAL
Lab: ABNORMAL
NITRITE UR-MCNC: NEGATIVE MG/ML
PH UR: 5.5 [PH] (ref 5–8)
PROT UR STRIP-MCNC: NEGATIVE MG/DL
RBC # UR STRIP: NEGATIVE /UL
SP GR UR: 1.02 (ref 1–1.03)
UROBILINOGEN UR QL: NORMAL

## 2025-05-23 PROCEDURE — 81003 URINALYSIS AUTO W/O SCOPE: CPT | Performed by: STUDENT IN AN ORGANIZED HEALTH CARE EDUCATION/TRAINING PROGRAM

## 2025-05-23 PROCEDURE — 99211 OFF/OP EST MAY X REQ PHY/QHP: CPT | Performed by: STUDENT IN AN ORGANIZED HEALTH CARE EDUCATION/TRAINING PROGRAM

## 2025-05-23 PROCEDURE — 87086 URINE CULTURE/COLONY COUNT: CPT | Performed by: STUDENT IN AN ORGANIZED HEALTH CARE EDUCATION/TRAINING PROGRAM

## 2025-05-23 NOTE — TELEPHONE ENCOUNTER
Caller: ANITHA KELLER    Relationship: SELF    Best call back number: [phone] (home)      What is the best time to reach you: ANY    Who are you requesting to speak with (clinical staff, provider,  specific staff member): ARTIE OR JAN    Do you know the name of the person who called: PT    What was the call regarding: PT HAVING TO USE MORE CATH STICKS THAN [employer] AND [employer]PING COMPANY WOULD LIKE TO GO FROM 90   AT A TIME. PT NEEDS AN ORDER AND A CALL TO THE COMPANY TO TO OK THIS REQUEST. PLEASE CALL BACK TO DISCUSS. THANK YOU.    PT ALSO BELIEVES THAT HE MAY HAVE A UTI AND WAS HOPING TO SPEAK WITH JAN REGARDING THIS ASAP. THANK YOU.

## 2025-05-23 NOTE — TELEPHONE ENCOUNTER
Called ans spoke with pt. He is experiencing cloudy urine again, will come in for a UA dip and cx.

## 2025-05-23 NOTE — PROGRESS NOTES
Pt came to clinic today to leave a urine sample. He is still experiencing dysuria and bladder spasms. UA dip in chart. Will send for cx as precaution.    ==  MA note reviewed    Adam Nguyen MD

## 2025-05-25 LAB — BACTERIA SPEC AEROBE CULT: NO GROWTH

## 2025-05-26 ENCOUNTER — HOSPITAL ENCOUNTER (EMERGENCY)
Facility: HOSPITAL | Age: 75
Discharge: HOME | End: 2025-05-26
Attending: EMERGENCY MEDICINE | Admitting: EMERGENCY MEDICINE
Payer: MEDICARE

## 2025-05-26 VITALS
HEIGHT: 73 IN | RESPIRATION RATE: 18 BRPM | TEMPERATURE: 97.9 F | OXYGEN SATURATION: 99 % | SYSTOLIC BLOOD PRESSURE: 151 MMHG | HEART RATE: 80 BPM | DIASTOLIC BLOOD PRESSURE: 98 MMHG | BODY MASS INDEX: 35.25 KG/M2 | WEIGHT: 266 LBS

## 2025-05-26 DIAGNOSIS — R33.8 ACUTE RETENTION OF URINE: ICD-10-CM

## 2025-05-26 DIAGNOSIS — N30.00 ACUTE CYSTITIS WITHOUT HEMATURIA: Primary | ICD-10-CM

## 2025-05-26 LAB
BACTERIA UR QL AUTO: ABNORMAL /HPF
BILIRUB UR QL STRIP: NEGATIVE
CLARITY UR: ABNORMAL
COLOR UR: YELLOW
GLUCOSE UR STRIP-MCNC: NEGATIVE MG/DL
HGB UR QL STRIP.AUTO: ABNORMAL
HYALINE CASTS UR QL AUTO: ABNORMAL /LPF
KETONES UR QL STRIP: NEGATIVE
LEUKOCYTE ESTERASE UR QL STRIP.AUTO: ABNORMAL
NITRITE UR QL STRIP: POSITIVE
PH UR STRIP.AUTO: 6 [PH] (ref 5–8)
PROT UR QL STRIP: ABNORMAL
RBC # UR STRIP: ABNORMAL /HPF
REF LAB TEST METHOD: ABNORMAL
SP GR UR STRIP: 1.02 (ref 1–1.03)
SQUAMOUS #/AREA URNS HPF: ABNORMAL /HPF
UROBILINOGEN UR QL STRIP: ABNORMAL
WBC # UR STRIP: ABNORMAL /HPF

## 2025-05-26 PROCEDURE — 99283 EMERGENCY DEPT VISIT LOW MDM: CPT | Performed by: EMERGENCY MEDICINE

## 2025-05-26 PROCEDURE — 81001 URINALYSIS AUTO W/SCOPE: CPT | Performed by: EMERGENCY MEDICINE

## 2025-05-26 PROCEDURE — 87186 SC STD MICRODIL/AGAR DIL: CPT | Performed by: EMERGENCY MEDICINE

## 2025-05-26 PROCEDURE — P9612 CATHETERIZE FOR URINE SPEC: HCPCS

## 2025-05-26 PROCEDURE — 87077 CULTURE AEROBIC IDENTIFY: CPT | Performed by: EMERGENCY MEDICINE

## 2025-05-26 PROCEDURE — 87086 URINE CULTURE/COLONY COUNT: CPT | Performed by: EMERGENCY MEDICINE

## 2025-05-26 RX ORDER — PHENAZOPYRIDINE HYDROCHLORIDE 100 MG/1
200 TABLET, FILM COATED ORAL ONCE
Status: COMPLETED | OUTPATIENT
Start: 2025-05-26 | End: 2025-05-26

## 2025-05-26 RX ORDER — LEVOFLOXACIN 750 MG/1
750 TABLET, FILM COATED ORAL DAILY
Qty: 10 TABLET | Refills: 0 | Status: SHIPPED | OUTPATIENT
Start: 2025-05-26 | End: 2025-06-05

## 2025-05-26 RX ORDER — LEVOFLOXACIN 750 MG/1
750 TABLET, FILM COATED ORAL ONCE
Status: COMPLETED | OUTPATIENT
Start: 2025-05-26 | End: 2025-05-26

## 2025-05-26 RX ORDER — CATHETER 12 FR
1 EACH MISCELLANEOUS 4 TIMES DAILY
Qty: 24 EACH | Refills: 0 | Status: SHIPPED | OUTPATIENT
Start: 2025-05-26 | End: 2025-06-02

## 2025-05-26 RX ADMIN — PHENAZOPYRIDINE 200 MG: 100 TABLET ORAL at 09:29

## 2025-05-26 RX ADMIN — LEVOFLOXACIN 750 MG: 750 TABLET, FILM COATED ORAL at 09:29

## 2025-05-26 NOTE — FSED PROVIDER NOTE
Subjective  History of Present Illness:    Patient presents to the urgency department with multiple issues today.  His most concerning is lower abdominal pressure.  He states that he feels that he has another urinary tract infection.  The patient self caths at home.  He has also ran out of his cath kits at home.  He tried to call his urologist today but they are offer the holiday.  Attempted to go to the pharmacy who told him that he needed a prescription for catheters.  He denies any fever, chills, cough, congestion, nausea, vomiting.  Denies any significant abdominal pain.  Only describes lower pelvic pressure when he feels the urge to urinate.  He states he has a very difficult time with urination and can only force out a small amount of urine.      Nurses Notes reviewed and agree, including vitals, allergies, social history and prior medical history.     REVIEW OF SYSTEMS: All systems reviewed and not pertinent unless noted.  Review of Systems   Gastrointestinal:  Positive for abdominal pain.   All other systems reviewed and are negative.      Past Medical History:   Diagnosis Date    A-fib     Arthritis 2013    Benign prostatic hyperplasia     Coronary artery disease 2017    Elevated PSA 11-21-24    See MRI    History of medical problems     Heart Ablasion 2013    Hyperlipidemia     Hypertension     Obesity     Prostate cancer     Completed cyber knife    Urinary tract infection     Treated       Allergies:    Zosyn [piperacillin-tazobactam in dex]      Past Surgical History:   Procedure Laterality Date    APPENDECTOMY  1986    Knee repacement left 2018    CARDIAC ABLATION  2013    CARDIAC SURGERY  2013    Ablasion    COLONOSCOPY  2021    CYBERKNIFE  03/28/2025    Prostate/SV    EYE SURGERY  2020    Detached Retina.   Catarac removed.    GOLD SEED FIDUCIAL PLACEMENT N/A 03/07/2025    Procedure: TRANSPERINEAL GOLD FIDUCIAL MARKER PLACEMENT;  Surgeon: Jeronimo Schwartz MD;  Location:  " [facility];  Service: Radiation Oncology;  Laterality: N/A;    JOINT REPLACEMENT  11/2018    Still hurts    KNEE SURGERY Left     PROSTATE SURGERY  3-2025    Cyber knife complete 3-    SEPTOPLASTY      VASECTOMY  2001    No         Social History     Socioeconomic History    Marital status:    Tobacco Use    Smoking status: Never    Smokeless tobacco: Never   Vaping Use    Vaping status: Never Used   Substance and Sexual Activity    Alcohol use: Yes     Alcohol/week: 10.0 standard drinks of alcohol     Types: 10 Drinks containing 0.5 oz of alcohol per week     Comment: Sure is good    Drug use: No    Sexual activity: Not Currently     Partners: Female     Birth control/protection: Vasectomy         Family History   Problem Relation Age of Onset    Hypertension Mother     Stroke Mother     Hypertension Sister     Diabetes Sister        Objective  Physical Exam:  /98   Pulse 80   Temp 97.9 °F (36.6 °C) (Oral)   Resp 18   Ht 185.4 cm (73\")   Wt 121 kg (266 lb)   SpO2 99%   BMI 35.09 kg/m²      Physical Exam  Vitals and nursing note reviewed.   Constitutional:       General: He is not in acute distress.     Appearance: Normal appearance. He is obese. He is not ill-appearing, toxic-appearing or diaphoretic.   HENT:      Head: Normocephalic and atraumatic.      Mouth/Throat:      Mouth: Mucous membranes are moist.   Eyes:      Extraocular Movements: Extraocular movements intact.      Conjunctiva/sclera: Conjunctivae normal.      Pupils: Pupils are equal, round, and reactive to light.   Cardiovascular:      Rate and Rhythm: Normal rate and regular rhythm.      Pulses: Normal pulses.      Heart sounds: Normal heart sounds.   Pulmonary:      Effort: Pulmonary effort is normal.      Breath sounds: Normal breath sounds.   Abdominal:      General: Abdomen is flat. Bowel sounds are normal.      Palpations: Abdomen is soft.      Tenderness: There is no abdominal tenderness. There is no guarding. "   Musculoskeletal:         General: Normal range of motion.   Skin:     General: Skin is warm.      Capillary Refill: Capillary refill takes less than 2 seconds.   Neurological:      General: No focal deficit present.      Mental Status: He is alert and oriented to person, place, and time.   Psychiatric:         Mood and Affect: Mood normal.         Behavior: Behavior normal.         Thought Content: Thought content normal.         Judgment: Judgment normal.         Procedures    ED Course:         Lab Results (last 24 hours)       Procedure Component Value Units Date/Time    Urinalysis With Microscopic If Indicated (No Culture) - Urine, Catheter [[id number]]  (Abnormal) Collected: 05/26/25 0901    Specimen: Urine, Catheter Updated: 05/26/25 0910     Color, UA Yellow     Appearance, UA Cloudy     pH, UA 6.0     Specific Gravity, UA 1.025     Glucose, UA Negative     Ketones, UA Negative     Bilirubin, UA Negative     Blood, UA Large (3+)     Protein, UA >=300 mg/dL (3+)     Leuk Esterase, UA Moderate (2+)     Nitrite, UA Positive     Urobilinogen, UA 0.2 E.U./dL    Urinalysis, Microscopic Only - Urine, Catheter [[id number]]  (Abnormal) Collected: 05/26/25 0901    Specimen: Urine, Catheter Updated: 05/26/25 0913     RBC, UA       Unable to determine due to loaded field     /HPF     WBC, UA Too Numerous to Count /HPF      Bacteria, UA       Unable to determine due to loaded field     /HPF     Squamous Epithelial Cells, UA       Unable to determine due to loaded field     /HPF     Hyaline Casts, UA       Unable to determine due to loaded field     /LPF     Methodology Manual Light Microscopy    Urine Culture - Urine, Urine, Catheter [[id number]] Collected: 05/26/25 0901    Specimen: Urine, Catheter Updated: 05/26/25 0914             No radiology results from the last 24 hrs       MDM  Number of Diagnoses or Management Options  Acute cystitis without hematuria  Diagnosis management comments: Patient was evaluated and found  to have a urinary tract infection.  Culture was sent.  His susceptibilities on his previous cultures were reviewed and the patient was started on Levaquin with a 10-day course.  He was provided with a prescription for his 16 French catheters which she is expecting to have on Wednesday but is currently out.  He was discharged home to follow-up with his urologist on an outpatient basis       Amount and/or Complexity of Data Reviewed  Clinical lab tests: reviewed        Data interpreted: Nursing notes reviewed, vital signs reviewed.  Labs independently interpreted by me (CBC, CMP, lipase, UA, troponin, ABG, lactic acid, procalcitonin).  Imaging independently interpreted by me (x-ray, CT scan).  EKG independently interpreted by me.  O2 saturation:    Counseling: Discussed the results above with the patient regarding need for admission or discharge.  Patient understands and agrees plan of care.      -----  ED Disposition       ED Disposition   Discharge    Condition   Stable    Comment   --             Final diagnoses:   Acute cystitis without hematuria   Acute retention of urine      Your Follow-Up Providers       Adam Nguyen MD In 3 days.    Specialty: Urology  [address] [facility]  [address]  [place] [address]  [phone]               [facility] EMERGENCY DEPART[facility] [place].    Specialty: Emergency Medicine  Follow up details: As needed  [address] [place] [address] [address]  [facility] [address] [phone]                     Contact information for after-discharge care    Follow-up information has not been specified.                    Your medication list        START taking these medications        Instructions Last Dose Given Next Dose Due   Bardia Silicone Rubio Catheter misc      Use 1 Device 4 (Four) Times a Day for 24 doses. 16 french lubricated straight catheter       levoFLOXacin 750 MG tablet  Commonly known as: LEVAQUIN      Take 1 tablet by mouth Daily for 10  days.              CONTINUE taking these medications        Instructions Last Dose Given Next Dose Due   Cartia  MG 24 hr capsule  Generic drug: dilTIAZem CD      Take 1 capsule by mouth Daily.       hyoscyamine 0.125 MG SL tablet  Commonly known as: Levsin/SL      Place 1 tablet under the tongue Every 4 (Four) Hours As Needed (Breakthrough bladder spasms).       losartan 50 MG tablet  Commonly known as: COZAAR      Take 1 tablet by mouth Daily. for blood pressure       MELATONIN ER PO      Take 1 tablet by mouth Every Night. Pure Zzzs       metoprolol succinate XL 25 MG 24 hr tablet  Commonly known as: TOPROL-XL      Take 1 tablet by mouth Daily.       oxybutynin XL 5 MG 24 hr tablet  Commonly known as: DITROPAN-XL      Take 1 tablet by mouth Daily for 90 days.       silodosin 8 MG capsule capsule  Commonly known as: RAPAFLO      Take 1 capsule by mouth Daily.       Xarelto 20 MG tablet  Generic drug: rivaroxaban      Take 1 tablet by mouth Daily.              STOP taking these medications      cefuroxime 500 MG tablet  Commonly known as: CEFTIN                  Where to Get Your Medications        These medications were sent to [facility] [address] [place] [address] - [address] [facility] - [phone]  - [phone]   [address] [place] [address]      Phone: [phone]   levoFLOXacin 750 MG tablet       You can get these medications from any pharmacy    Bring a paper prescription for each of these medications  Samantha Silicone Rubio Catheter misc

## 2025-05-29 LAB — BACTERIA SPEC AEROBE CULT: ABNORMAL

## 2025-06-02 ENCOUNTER — OFFICE VISIT (OUTPATIENT)
Age: 75
End: 2025-06-02
Payer: MEDICARE

## 2025-06-02 DIAGNOSIS — N40.1 URINARY RETENTION DUE TO BENIGN PROSTATIC HYPERPLASIA: ICD-10-CM

## 2025-06-02 DIAGNOSIS — C61 PROSTATE CANCER: ICD-10-CM

## 2025-06-02 DIAGNOSIS — N39.0 RECURRENT UTI: Primary | ICD-10-CM

## 2025-06-02 DIAGNOSIS — R33.8 URINARY RETENTION DUE TO BENIGN PROSTATIC HYPERPLASIA: ICD-10-CM

## 2025-06-02 PROCEDURE — 87086 URINE CULTURE/COLONY COUNT: CPT | Performed by: STUDENT IN AN ORGANIZED HEALTH CARE EDUCATION/TRAINING PROGRAM

## 2025-06-02 PROCEDURE — 1160F RVW MEDS BY RX/DR IN RCRD: CPT | Performed by: STUDENT IN AN ORGANIZED HEALTH CARE EDUCATION/TRAINING PROGRAM

## 2025-06-02 PROCEDURE — 1159F MED LIST DOCD IN RCRD: CPT | Performed by: STUDENT IN AN ORGANIZED HEALTH CARE EDUCATION/TRAINING PROGRAM

## 2025-06-02 PROCEDURE — 99213 OFFICE O/P EST LOW 20 MIN: CPT | Performed by: STUDENT IN AN ORGANIZED HEALTH CARE EDUCATION/TRAINING PROGRAM

## 2025-06-02 RX ORDER — LEVOFLOXACIN 500 MG/1
500 TABLET, FILM COATED ORAL DAILY
Qty: 10 TABLET | Refills: 0 | Status: SHIPPED | OUTPATIENT
Start: 2025-06-02 | End: 2025-06-12

## 2025-06-02 NOTE — PROGRESS NOTES
Follow Up Office Visit      Patient Name: Vishal Dejesus  : 1950   MRN: [id number]     Chief Complaint:  No chief complaint on file.      Referring Provider: No ref. provider found    History of Present Illness: Vishal Dejesus is a 74 y.o. male who presents today for follow up of recurrent UTI, significant BPH, chronic urinary retention, prostate cancer status post CyberKnife.  He is on my schedule for TURP in July.  Visit today for repeat urine culture prior to surgery.  He has been taking Ditropan, Levsin and Pyridium as needed for significant bladder spasms.  He is no longer taking Rapaflo.  He has been managed with intermittent catheterization.  He catheterizes 3-4 times per day.  He went to the ER last week and was diagnosed with an abnormal bacteria Raoultella with multiple resistances.  He is on Levaquin currently.  He is going out of town for multiple trips and requests to have antibiotic on hand in case of recurrent UTI.  I recommend a repeat urine culture today in addition    Subjective      Review of System: Review of Systems   Genitourinary:  Positive for difficulty urinating and frequency.      I have reviewed the ROS documented by my clinical staff, I have updated appropriately and I agree. Adam Nguyen MD    I have reviewed and the following portions of the patient's history were updated as appropriate: past family history, past medical history, past social history, past surgical history and problem list.    Medications:     Current Outpatient Medications:     CARTIA  MG 24 hr capsule, Take 1 capsule by mouth Daily., Disp: , Rfl: 0    levoFLOXacin (LEVAQUIN) 750 MG tablet, Take 1 tablet by mouth Daily for 10 days., Disp: 10 tablet, Rfl: 0    losartan (COZAAR) 50 MG tablet, Take 1 tablet by mouth Daily. for blood pressure, Disp: , Rfl:     MELATONIN ER PO, Take 1 tablet by mouth Every Night. Pure Zzzs, Disp: , Rfl:     metoprolol succinate XL (TOPROL-XL) 25 MG 24  hr tablet, Take 1 tablet by mouth Daily., Disp: , Rfl:     oxybutynin XL (DITROPAN-XL) 5 MG 24 hr tablet, Take 1 tablet by mouth Daily for 90 days., Disp: 90 tablet, Rfl: 0    Xarelto 20 MG tablet, Take 1 tablet by mouth Daily., Disp: , Rfl:     hyoscyamine (Levsin/SL) 0.125 MG SL tablet, Place 1 tablet under the tongue Every 4 (Four) Hours As Needed (Breakthrough bladder spasms). (Patient not taking: Reported on 6/2/2025), Disp: 30 tablet, Rfl: 2    levoFLOXacin (LEVAQUIN) 500 MG tablet, Take 1 tablet by mouth Daily for 10 days., Disp: 10 tablet, Rfl: 0    silodosin (RAPAFLO) 8 MG capsule capsule, Take 1 capsule by mouth Daily. (Patient not taking: Reported on 4/15/2025), Disp: , Rfl:     Allergies:   Allergies   Allergen Reactions    Zosyn [Piperacillin-Tazobactam In Dex] Shortness Of Breath     Has tolerated Cefepime  Inpatient reaction 12/19: throat swelling and SOA req trx.       IPSS Questionnaire (AUA-7):  Over the past month…    1)  Incomplete Emptying:       How often have you had a sensation of not emptying you had the sensation of not emptying your bladder completely after you finished urinating?  0 - Not at all   2)  Frequency:       How often have you had the urinate again less than two hours after you finished urinating?  4 - More than half the time   3)  Intermittency:       How often have you found you stopped and started again several times when you urinated?   0 - Not at all   4) Urgency:      How often have you found it difficult to postpone urination?  0 - Not at all   5) Weak Stream:      How often have you had a weak urinary stream?  0 - Not at all   6) Straining:       How often have you had to push or strain to begin urination?  0 - Not at all   7) Nocturia:      How many times did you most typically get up to urinate from the time you went to bed at night until the time you got up in the morning?  1 - 1 time   Total Score:  5   The International Prostate Symptom Score (IPSS) is used to  screen, diagnose, track symptoms of benign prostatic hyperplasia (BPH).   0-7 (Mild Symptoms) 8-19 (Moderate) 20-35 (Severe)   Quality of Life (QoL):  If you were to spend the rest of your life with your urinary condition just the way it is now, how would you feel about that? 6-Terrible   Urine Leakage (Incontinence) 0-No Leakage         Objective     Physical Exam:   Vital Signs: There were no vitals filed for this visit.  There is no height or weight on file to calculate BMI.     Physical Exam  Constitutional:       Appearance: Normal appearance.   HENT:      Head: Normocephalic and atraumatic.      Nose: Nose normal.   Eyes:      Extraocular Movements: Extraocular movements intact.      Conjunctiva/sclera: Conjunctivae normal.      Pupils: Pupils are equal, round, and reactive to light.   Musculoskeletal:         General: Normal range of motion.      Cervical back: Normal range of motion and neck supple.   Skin:     General: Skin is warm and dry.      Findings: No lesion or rash.   Neurological:      General: No focal deficit present.      Mental Status: He is alert and oriented to person, place, and time. Mental status is at baseline.   Psychiatric:         Mood and Affect: Mood normal.         Behavior: Behavior normal.         Labs:   Brief Urine Lab Results  (Last result in the past 365 days)        Color   Clarity   Blood   Leuk Est   Nitrite   Protein   CREAT   Urine HCG        05/26/25 0901 Yellow   Cloudy   Large (3+)   Moderate (2+)   Positive   >=300 mg/dL (3+)                   Urine Culture          5/5/2025    10:46 5/23/2025    16:04 5/26/2025    09:01   Urine Culture   Urine Culture 25,000 CFU/mL Klebsiella pneumoniae ssp pneumoniae  No growth  50,000 CFU/mL Raoultella planticola         Lab Results   Component Value Date    GLUCOSE 116 (H) 05/05/2025    CALCIUM 9.4 05/05/2025     05/05/2025    K 3.8 05/05/2025    CO2 22.5 05/05/2025     05/05/2025    BUN 17 05/05/2025    CREATININE  0.87 05/05/2025    EGFRIFAFRI 70 10/04/2024    EGFRIFNONA 82 10/29/2019    BCR 19.5 05/05/2025    ANIONGAP 12.5 05/05/2025       Lab Results   Component Value Date    WBC 7.76 05/05/2025    HGB 15.9 05/05/2025    HCT 47.0 05/05/2025    MCV 91.8 05/05/2025     (L) 05/05/2025       Images:   CT Abdomen Pelvis With Contrast  Result Date: 5/5/2025  Impression: Persistent versus recurrent cystitis, which may be related to radiation. No hydronephrosis. Stable 2.6 cm exophytic cystic pancreatic lesion, possibly a postinflammatory cyst or sidebranch IPMN. Stable chronic/ancillary findings as above. Electronically Signed: Sid Parson MD  5/5/2025 11:09 AM EDT  Workstation ID: [id number]    CT Abdomen Pelvis With Contrast  Result Date: 4/14/2025  Impression: Urinary bladder wall thickening with inflammatory fat stranding compatible with cystitis. Diverticulosis. Stable appearance to pancreatic cyst measuring 2.8 cm. Electronically Signed: Mitzi Gilliam MD  4/14/2025 11:42 AM EDT  Workstation ID: [id number]    MRI Cyberknife Pelvis Without Contrast  Result Date: 3/15/2025  Impression: 1. Motion-degraded exam. 2. Few fiduciary markers in the prostate gland. 3. Retroprostatic hydrogel spacer without overt rectal wall invasion. Electronically Signed: Orlando Varela MD  3/15/2025 1:41 PM EDT  Workstation ID: [id number]      Measures:   Tobacco:   Vishal Jolly Anjelica  reports that he has never smoked. He has never used smokeless tobacco.      Assessment / Plan      Assessment/Plan:   74 y.o. male who presented today for follow up of recurrent UTI secondary to urinary retention managing with intermittent catheterization.  Prostate cancer status post CyberKnife.  On scheduled for TURP in July.  Urine culture today.  Continue self-catheterization.  Levaquin prescribed given he has multiple upcoming out-of-town trips scheduled and would like to have antibiotic on hand in case of fever or UTI symptoms which is reasonable.   Proceeding with TURP as scheduled.  Risks benefits alternatives discussed.  He elects to proceed    Diagnoses and all orders for this visit:    1. Recurrent UTI (Primary)  -     Urine Culture - Urine, Urine, Clean Catch  -     levoFLOXacin (LEVAQUIN) 500 MG tablet; Take 1 tablet by mouth Daily for 10 days.  Dispense: 10 tablet; Refill: 0    2. Prostate cancer    3. Urinary retention due to benign prostatic hyperplasia           Follow Up:   Return for He's on schedule for surgery July .    I spent approximately 20 minutes providing clinical care for this patient; including review of patient's chart and provider documentation, face to face time spent with patient in examination room (obtaining history, performing physical exam, discussing diagnosis and management options), placing orders, and completing patient documentation.     Adam Nguyen MD  [facility] [place]

## 2025-06-04 LAB — BACTERIA SPEC AEROBE CULT: NO GROWTH

## 2025-06-17 ENCOUNTER — TELEPHONE (OUTPATIENT)
Dept: UROLOGY | Facility: CLINIC | Age: 75
End: 2025-06-17

## 2025-06-17 DIAGNOSIS — N39.0 RECURRENT UTI: Primary | ICD-10-CM

## 2025-06-17 RX ORDER — LEVOFLOXACIN 500 MG/1
500 TABLET, FILM COATED ORAL DAILY
Qty: 10 TABLET | Refills: 0 | Status: SHIPPED | OUTPATIENT
Start: 2025-06-17 | End: 2025-06-27

## 2025-06-17 NOTE — TELEPHONE ENCOUNTER
Caller: ANITHA MATHIASR    Relationship: SELF    Best call back number: [phone]    Requested Prescriptions:   Requested Prescriptions      No prescriptions requested or ordered in this encounter    REQUESTING LEVOFLOXACIN 500MG. PATIENT IS HEADED TO [place] FOR 10 DAYS ASKING FOR A 10 DAY SCRIPT    Pharmacy where request should be sent:      Last office visit with prescribing clinician: 6/2/2025   Last telemedicine visit with prescribing clinician: Visit date not found   Next office visit with prescribing clinician: Visit date not found       Does the patient have less than a 3 day supply:  [x] Yes  [] No    Would you like a call back once the refill request has been completed: [x] Yes [] No    If the office needs to give you a call back, can they leave a voicemail: [x] Yes [] No    Ketan Woodward-Allie   06/17/25 11:25 EDT

## 2025-07-02 ENCOUNTER — PRE-ADMISSION TESTING (OUTPATIENT)
Dept: PREADMISSION TESTING | Facility: HOSPITAL | Age: 75
End: 2025-07-02
Payer: MEDICARE

## 2025-07-02 VITALS — HEIGHT: 73 IN | BODY MASS INDEX: 34.67 KG/M2 | WEIGHT: 261.58 LBS

## 2025-07-02 DIAGNOSIS — N13.8 BPH WITH OBSTRUCTION/LOWER URINARY TRACT SYMPTOMS: ICD-10-CM

## 2025-07-02 DIAGNOSIS — N40.1 URINARY RETENTION DUE TO BENIGN PROSTATIC HYPERPLASIA: ICD-10-CM

## 2025-07-02 DIAGNOSIS — R33.8 URINARY RETENTION DUE TO BENIGN PROSTATIC HYPERPLASIA: ICD-10-CM

## 2025-07-02 DIAGNOSIS — N40.1 BPH WITH OBSTRUCTION/LOWER URINARY TRACT SYMPTOMS: ICD-10-CM

## 2025-07-02 LAB
ANION GAP SERPL CALCULATED.3IONS-SCNC: 12 MMOL/L (ref 5–15)
BUN SERPL-MCNC: 11.7 MG/DL (ref 8–23)
BUN/CREAT SERPL: 13.6 (ref 7–25)
CALCIUM SPEC-SCNC: 9 MG/DL (ref 8.6–10.5)
CHLORIDE SERPL-SCNC: 106 MMOL/L (ref 98–107)
CO2 SERPL-SCNC: 23 MMOL/L (ref 22–29)
CREAT SERPL-MCNC: 0.86 MG/DL (ref 0.76–1.27)
DEPRECATED RDW RBC AUTO: 45.9 FL (ref 37–54)
EGFRCR SERPLBLD CKD-EPI 2021: 90.9 ML/MIN/1.73
ERYTHROCYTE [DISTWIDTH] IN BLOOD BY AUTOMATED COUNT: 13.5 % (ref 12.3–15.4)
GLUCOSE SERPL-MCNC: 111 MG/DL (ref 65–99)
HBA1C MFR BLD: 5.35 % (ref 4.8–5.6)
HCT VFR BLD AUTO: 46.7 % (ref 37.5–51)
HGB BLD-MCNC: 15.8 G/DL (ref 13–17.7)
INR PPP: 1.02 (ref 0.89–1.12)
MCH RBC QN AUTO: 31.1 PG (ref 26.6–33)
MCHC RBC AUTO-ENTMCNC: 33.8 G/DL (ref 31.5–35.7)
MCV RBC AUTO: 91.9 FL (ref 79–97)
PLATELET # BLD AUTO: 131 10*3/MM3 (ref 140–450)
PMV BLD AUTO: 9.6 FL (ref 6–12)
POTASSIUM SERPL-SCNC: 4 MMOL/L (ref 3.5–5.2)
PROTHROMBIN TIME: 14 SECONDS (ref 12.2–15.3)
RBC # BLD AUTO: 5.08 10*6/MM3 (ref 4.14–5.8)
SODIUM SERPL-SCNC: 141 MMOL/L (ref 136–145)
WBC NRBC COR # BLD AUTO: 4.84 10*3/MM3 (ref 3.4–10.8)

## 2025-07-02 PROCEDURE — 85027 COMPLETE CBC AUTOMATED: CPT

## 2025-07-02 PROCEDURE — 36415 COLL VENOUS BLD VENIPUNCTURE: CPT

## 2025-07-02 PROCEDURE — 83036 HEMOGLOBIN GLYCOSYLATED A1C: CPT

## 2025-07-02 PROCEDURE — 80048 BASIC METABOLIC PNL TOTAL CA: CPT

## 2025-07-02 PROCEDURE — 85610 PROTHROMBIN TIME: CPT

## 2025-07-02 NOTE — PAT
Patient did not review general PAT education video as instructed in their preoperative information received from their surgeon.  One-on-one Pre Admission Testing general education provided during PAT visit.  Copies of PAT general education handouts (Incentive Spirometry, Meds to Beds Program, Patient Belongings, Pre-op skin preparation instructions, Blood Glucose testing, Visitor policy, Surgery FAQ, Code H) distributed to patient if not viewed electronically on [facility] website. Encouraged patient/family to read PAT general education handouts (electronic copies or hard copies) thoroughly and notify PAT staff with any questions or concerns. Patient instructed to bring PAT pass and completed skin prep sheet (if applicable) on the day of procedure. Patient verbalized understanding of all information and priority content.     Patient instructed to drink 20 ounces of Gatorade or Gatorlyte (if diabetic) and it needs to be completed 1 hour (for Main OR patients) or 2 hours (scheduled  section & [facility] patients) before given arrival time for procedure (NO RED Gatorade and NO Gatorade Zero).    Patient verbalized understanding.    Per Anesthesia Request, patient instructed not to take their ACE/ARB medications on the AM of surgery.    Known long history of Afib.   Pt will hold Xarleto 3 days prior as instructed by Dr. Nguyen (per pt report).     Pt unable to void in PAT for UA.  States he does self cath, but did not bring today.  Instructed to bring self cath DOS for UA.

## 2025-07-09 ENCOUNTER — TELEPHONE (OUTPATIENT)
Dept: UROLOGY | Facility: CLINIC | Age: 75
End: 2025-07-09
Payer: MEDICARE

## 2025-07-09 NOTE — TELEPHONE ENCOUNTER
Called and relayed Dr. Figueroa message to PT regarding surgery and him feeling sick. He verbalized understanding. I recommended he stay well hydrated and eats well to help him recover. He verbalized understanding.

## 2025-07-09 NOTE — TELEPHONE ENCOUNTER
Hub staff attempted to follow warm transfer process and was unsuccessful     Caller: ANITHA KELLER    Relationship to patient: SELF    Best call back number:   Telephone Information:   Mobile [phone]   CALL ANY TIME, OK TO LVM     Patient is needing: PT WAS ADVISED THAT IF HE IS HAVING ANY HEALTH ISSUES PRIOR TO HIS CYSTO THIS FRIDAY 7/11/25 TO LET US KNOW. PT REPORTS THAT HE IS GETTING A COLD - HAS A LOT OF DRAINAGE AND A SORE THROAT BUT THAT HE FEELS OK AND IS NOT WANTING TO PUSH BACK THE SURGERY. HE WANTED TO LET US KNOW WHAT WAS GOING ON PER INSTRUCTIONS. CAN PT STILL HAVE HIS CYSTO FRI? PLEASE CALL PT TO ADVISE. THANK YOU!

## 2025-07-10 ENCOUNTER — TELEPHONE (OUTPATIENT)
Dept: UROLOGY | Facility: CLINIC | Age: 75
End: 2025-07-10
Payer: MEDICARE

## 2025-07-10 NOTE — TELEPHONE ENCOUNTER
Hub staff attempted to follow warm transfer process and was unsuccessful     Caller: Vishal Dejesus     Relationship to patient: SELF    Best call back number: [phone]     Patient is needing: PT IS SCHEDULED FOR SURGERY TOMORROW BUT STILL ISN'T FEELING WELL AND WOULD LIKE TO RESCHEDULE IT. PLEASE GIVE HIM A CALL BACK.

## 2025-07-11 NOTE — TELEPHONE ENCOUNTER
Caller: Vishal Dejesus    Relationship: Self    Best call back number: [phone]     What is the best time to reach you: ANYTIME    Who are you requesting to speak with (clinical staff, provider,  specific staff member): CLINICAL    Do you know the name of the person who called: INCOMING CALL    What was the call regarding: PT RESCHEDULED SURGERY AND IS ASKING IF HE SHOULD STAY ON LEVAQUIN UNTIL THEN. PT IS OUT.     IF SO, HE USES:   [address] [phone] [facility] [phone] FX     Is it okay if the provider responds through [employer]: YES

## 2025-07-23 ENCOUNTER — CLINICAL SUPPORT (OUTPATIENT)
Age: 75
End: 2025-07-23
Payer: COMMERCIAL

## 2025-07-23 ENCOUNTER — RESULTS FOLLOW-UP (OUTPATIENT)
Age: 75
End: 2025-07-23
Payer: MEDICARE

## 2025-07-23 DIAGNOSIS — N39.0 RECURRENT UTI: Primary | ICD-10-CM

## 2025-07-23 DIAGNOSIS — N30.00 ACUTE CYSTITIS WITHOUT HEMATURIA: Primary | ICD-10-CM

## 2025-07-23 LAB
BILIRUB BLD-MCNC: NEGATIVE MG/DL
CLARITY, POC: ABNORMAL
COLOR UR: YELLOW
EXPIRATION DATE: ABNORMAL
GLUCOSE UR STRIP-MCNC: NEGATIVE MG/DL
KETONES UR QL: ABNORMAL
LEUKOCYTE EST, POC: ABNORMAL
Lab: ABNORMAL
NITRITE UR-MCNC: POSITIVE MG/ML
PH UR: 6 [PH] (ref 5–8)
PROT UR STRIP-MCNC: ABNORMAL MG/DL
RBC # UR STRIP: ABNORMAL /UL
SP GR UR: 1.02 (ref 1–1.03)
UROBILINOGEN UR QL: NORMAL

## 2025-07-23 PROCEDURE — 87088 URINE BACTERIA CULTURE: CPT | Performed by: STUDENT IN AN ORGANIZED HEALTH CARE EDUCATION/TRAINING PROGRAM

## 2025-07-23 PROCEDURE — 87186 SC STD MICRODIL/AGAR DIL: CPT | Performed by: STUDENT IN AN ORGANIZED HEALTH CARE EDUCATION/TRAINING PROGRAM

## 2025-07-23 PROCEDURE — 87086 URINE CULTURE/COLONY COUNT: CPT | Performed by: STUDENT IN AN ORGANIZED HEALTH CARE EDUCATION/TRAINING PROGRAM

## 2025-07-23 RX ORDER — CIPROFLOXACIN 500 MG/1
500 TABLET, FILM COATED ORAL 2 TIMES DAILY
Qty: 14 TABLET | Refills: 0 | Status: SHIPPED | OUTPATIENT
Start: 2025-07-23

## 2025-07-23 NOTE — PROGRESS NOTES
Pt came by to drop off a urine sample. Stated been having uti symptoms and wanted to have his urine checked. He is having urgency, cloudy urine. Sent for culture.        ===  MA note reviewed.  Urine culture sent.  I sent in ciprofloxacin to start UTI treatment    Adam Nguyen MD

## 2025-07-23 NOTE — TELEPHONE ENCOUNTER
LVM to let pt know that he will have a antibiotic called in to his pharmacy and we will await the culture to make sure we don't need to change medications.

## 2025-07-23 NOTE — PROGRESS NOTES
Vishal looks like he has another urinary tract infection unfortunately.  He is scheduled for surgery next week.  I am sending him 1 week of ciprofloxacin to his pharmacy.  I will await the results of his speciation and susceptibility on his urine culture and notify him if need for antibiotic change. Please let him know

## 2025-07-25 LAB — BACTERIA SPEC AEROBE CULT: ABNORMAL

## 2025-08-05 DIAGNOSIS — N30.00 ACUTE CYSTITIS WITHOUT HEMATURIA: Primary | ICD-10-CM

## 2025-08-05 RX ORDER — CIPROFLOXACIN 500 MG/1
500 TABLET, FILM COATED ORAL 2 TIMES DAILY
Qty: 20 TABLET | Refills: 0 | Status: SHIPPED | OUTPATIENT
Start: 2025-08-05

## 2025-08-08 ENCOUNTER — TELEPHONE (OUTPATIENT)
Dept: UROLOGY | Facility: CLINIC | Age: 75
End: 2025-08-08

## 2025-08-08 ENCOUNTER — HOSPITAL ENCOUNTER (OUTPATIENT)
Facility: HOSPITAL | Age: 75
Discharge: HOME | End: 2025-08-09
Attending: STUDENT IN AN ORGANIZED HEALTH CARE EDUCATION/TRAINING PROGRAM | Admitting: STUDENT IN AN ORGANIZED HEALTH CARE EDUCATION/TRAINING PROGRAM
Payer: COMMERCIAL

## 2025-08-08 ENCOUNTER — ANESTHESIA EVENT (OUTPATIENT)
Dept: PERIOP | Facility: HOSPITAL | Age: 75
End: 2025-08-08
Payer: COMMERCIAL

## 2025-08-08 ENCOUNTER — ANESTHESIA (OUTPATIENT)
Dept: PERIOP | Facility: HOSPITAL | Age: 75
End: 2025-08-08
Payer: COMMERCIAL

## 2025-08-08 PROBLEM — I25.10 CORONARY ARTERY DISEASE: Status: ACTIVE | Noted: 2025-08-08

## 2025-08-08 PROCEDURE — 25010000002 PROPOFOL 10 MG/ML EMULSION

## 2025-08-08 PROCEDURE — 25010000002 ONDANSETRON PER 1 MG

## 2025-08-08 PROCEDURE — 25010000002 SUGAMMADEX 500 MG/5ML SOLUTION

## 2025-08-08 PROCEDURE — 25010000002 FENTANYL CITRATE (PF) 100 MCG/2ML SOLUTION

## 2025-08-08 PROCEDURE — 25010000002 CEFTRIAXONE PER 250 MG: Performed by: STUDENT IN AN ORGANIZED HEALTH CARE EDUCATION/TRAINING PROGRAM

## 2025-08-08 PROCEDURE — 25010000002 DEXAMETHASONE PER 1 MG

## 2025-08-08 PROCEDURE — 25010000002 LIDOCAINE PF 1% 1 % SOLUTION

## 2025-08-08 RX ORDER — PROPOFOL 10 MG/ML
VIAL (ML) INTRAVENOUS AS NEEDED
Status: DISCONTINUED | OUTPATIENT
Start: 2025-08-08 | End: 2025-08-08 | Stop reason: SURG

## 2025-08-08 RX ORDER — ONDANSETRON 2 MG/ML
INJECTION INTRAMUSCULAR; INTRAVENOUS AS NEEDED
Status: DISCONTINUED | OUTPATIENT
Start: 2025-08-08 | End: 2025-08-08 | Stop reason: SURG

## 2025-08-08 RX ORDER — ROCURONIUM BROMIDE 10 MG/ML
INJECTION, SOLUTION INTRAVENOUS AS NEEDED
Status: DISCONTINUED | OUTPATIENT
Start: 2025-08-08 | End: 2025-08-08 | Stop reason: SURG

## 2025-08-08 RX ORDER — LABETALOL HYDROCHLORIDE 5 MG/ML
INJECTION, SOLUTION INTRAVENOUS AS NEEDED
Status: DISCONTINUED | OUTPATIENT
Start: 2025-08-08 | End: 2025-08-08 | Stop reason: SURG

## 2025-08-08 RX ORDER — FENTANYL CITRATE 50 UG/ML
INJECTION, SOLUTION INTRAMUSCULAR; INTRAVENOUS AS NEEDED
Status: DISCONTINUED | OUTPATIENT
Start: 2025-08-08 | End: 2025-08-08 | Stop reason: SURG

## 2025-08-08 RX ORDER — DEXAMETHASONE SODIUM PHOSPHATE 4 MG/ML
INJECTION, SOLUTION INTRA-ARTICULAR; INTRALESIONAL; INTRAMUSCULAR; INTRAVENOUS; SOFT TISSUE AS NEEDED
Status: DISCONTINUED | OUTPATIENT
Start: 2025-08-08 | End: 2025-08-08 | Stop reason: SURG

## 2025-08-08 RX ORDER — LIDOCAINE HYDROCHLORIDE 10 MG/ML
INJECTION, SOLUTION EPIDURAL; INFILTRATION; INTRACAUDAL; PERINEURAL AS NEEDED
Status: DISCONTINUED | OUTPATIENT
Start: 2025-08-08 | End: 2025-08-08 | Stop reason: SURG

## 2025-08-08 RX ADMIN — Medication 2.5 MG: at 13:40

## 2025-08-08 RX ADMIN — Medication 2.5 MG: at 13:18

## 2025-08-08 RX ADMIN — DEXAMETHASONE SODIUM PHOSPHATE 4 MG: 4 INJECTION, SOLUTION INTRAMUSCULAR; INTRAVENOUS at 12:59

## 2025-08-08 RX ADMIN — SODIUM CHLORIDE 2000 MG: 900 INJECTION INTRAVENOUS at 13:03

## 2025-08-08 RX ADMIN — ROCURONIUM BROMIDE 50 MG: 10 INJECTION INTRAVENOUS at 12:59

## 2025-08-08 RX ADMIN — PROPOFOL 200 MG: 10 INJECTION, EMULSION INTRAVENOUS at 12:59

## 2025-08-08 RX ADMIN — SUGAMMADEX 300 MG: 100 INJECTION, SOLUTION INTRAVENOUS at 13:56

## 2025-08-08 RX ADMIN — ONDANSETRON 4 MG: 2 INJECTION INTRAMUSCULAR; INTRAVENOUS at 12:59

## 2025-08-08 RX ADMIN — LIDOCAINE HYDROCHLORIDE 50 MG: 10 INJECTION, SOLUTION EPIDURAL; INFILTRATION; INTRACAUDAL; PERINEURAL at 12:59

## 2025-08-08 RX ADMIN — FENTANYL CITRATE 100 MCG: 50 INJECTION, SOLUTION INTRAMUSCULAR; INTRAVENOUS at 12:59

## 2025-08-08 RX ADMIN — ROCURONIUM BROMIDE 10 MG: 10 INJECTION INTRAVENOUS at 13:40

## 2025-08-11 ENCOUNTER — CLINICAL SUPPORT (OUTPATIENT)
Age: 75
End: 2025-08-11
Payer: MEDICARE

## 2025-08-11 DIAGNOSIS — Z46.6 ENCOUNTER FOR FOLEY CATHETER REMOVAL: Primary | ICD-10-CM

## 2025-08-25 ENCOUNTER — OFFICE VISIT (OUTPATIENT)
Dept: CARDIOLOGY | Facility: CLINIC | Age: 75
End: 2025-08-25
Payer: MEDICARE

## 2025-08-25 VITALS
OXYGEN SATURATION: 95 % | HEIGHT: 73 IN | DIASTOLIC BLOOD PRESSURE: 84 MMHG | WEIGHT: 256.6 LBS | BODY MASS INDEX: 34.01 KG/M2 | SYSTOLIC BLOOD PRESSURE: 126 MMHG | HEART RATE: 85 BPM

## 2025-08-25 DIAGNOSIS — I48.0 AF (PAROXYSMAL ATRIAL FIBRILLATION): Primary | ICD-10-CM

## 2025-08-25 PROBLEM — I45.9 CONDUCTION DISORDER OF THE HEART: Status: RESOLVED | Noted: 2022-06-15 | Resolved: 2025-08-25

## 2025-08-25 PROBLEM — Z86.73 HISTORY OF CEREBROVASCULAR ACCIDENT: Status: RESOLVED | Noted: 2022-06-15 | Resolved: 2025-08-25

## 2025-08-29 ENCOUNTER — TELEPHONE (OUTPATIENT)
Dept: CARDIOLOGY | Facility: CLINIC | Age: 75
End: 2025-08-29
Payer: MEDICARE

## (undated) DEVICE — PREP SOL POVIDONE/IODINE BT 4OZ

## (undated) DEVICE — SYR LUERLOK 20CC BX/50

## (undated) DEVICE — PACK,CYSTOSCOPY,PK VI: Brand: MEDLINE

## (undated) DEVICE — SYR CONTRL LUERLOK 10CC

## (undated) DEVICE — STERILE ULTRASOUND GEL, SAFEWRAP: Brand: ECOVUE

## (undated) DEVICE — SPNG GZ WOVN 4X4IN 12PLY 10/BX STRL

## (undated) DEVICE — GLV SURG SENSICARE PI MIC PF SZ7.5 LF STRL

## (undated) DEVICE — HYPODERMIC SAFETY NEEDLE: Brand: MONOJECT

## (undated) DEVICE — PAD,ARMBOARD,CONV,FOAM,2X8X20",12PR/CS: Brand: MEDLINE

## (undated) DEVICE — STRAP POSTN KN/BDY FM 5X72IN DISP

## (undated) DEVICE — TOWEL,OR,DSP,ST,NATURAL,DLX,4/PK,20PK/CS: Brand: MEDLINE